# Patient Record
Sex: MALE | Race: WHITE | NOT HISPANIC OR LATINO | Employment: OTHER | URBAN - METROPOLITAN AREA
[De-identification: names, ages, dates, MRNs, and addresses within clinical notes are randomized per-mention and may not be internally consistent; named-entity substitution may affect disease eponyms.]

---

## 2017-08-24 ENCOUNTER — THERAPY VISIT (OUTPATIENT)
Dept: PHYSICAL THERAPY | Facility: CLINIC | Age: 67
End: 2017-08-24
Payer: COMMERCIAL

## 2017-08-24 DIAGNOSIS — M25.561 RIGHT KNEE PAIN, UNSPECIFIED CHRONICITY: Primary | ICD-10-CM

## 2017-08-24 PROCEDURE — 97110 THERAPEUTIC EXERCISES: CPT | Mod: GP | Performed by: PHYSICAL THERAPIST

## 2017-08-24 PROCEDURE — 97161 PT EVAL LOW COMPLEX 20 MIN: CPT | Mod: GP | Performed by: PHYSICAL THERAPIST

## 2017-08-24 ASSESSMENT — ACTIVITIES OF DAILY LIVING (ADL)
HOW_WOULD_YOU_RATE_THE_CURRENT_FUNCTION_OF_YOUR_KNEE_DURING_YOUR_USUAL_DAILY_ACTIVITIES_ON_A_SCALE_FROM_0_TO_100_WITH_100_BEING_YOUR_LEVEL_OF_KNEE_FUNCTION_PRIOR_TO_YOUR_INJURY_AND_0_BEING_THE_INABILITY_TO_PERFORM_ANY_OF_YOUR_USUAL_DAILY_ACTIVITIES?: 30
STIFFNESS: THE SYMPTOM AFFECTS MY ACTIVITY SLIGHTLY
KNEE_ACTIVITY_OF_DAILY_LIVING_SCORE: 41.43
SWELLING: THE SYMPTOM AFFECTS MY ACTIVITY SLIGHTLY
STAND: ACTIVITY IS SOMEWHAT DIFFICULT
KNEE_ACTIVITY_OF_DAILY_LIVING_SUM: 29
AS_A_RESULT_OF_YOUR_KNEE_INJURY,_HOW_WOULD_YOU_RATE_YOUR_CURRENT_LEVEL_OF_DAILY_ACTIVITY?: ABNORMAL
WALK: ACTIVITY IS FAIRLY DIFFICULT
RAW_SCORE: 29
PAIN: THE SYMPTOM AFFECTS MY ACTIVITY MODERATELY
GO UP STAIRS: ACTIVITY IS VERY DIFFICULT
LIMPING: THE SYMPTOM AFFECTS MY ACTIVITY SEVERELY
KNEEL ON THE FRONT OF YOUR KNEE: ACTIVITY IS VERY DIFFICULT
WEAKNESS: THE SYMPTOM AFFECTS MY ACTIVITY MODERATELY
HOW_WOULD_YOU_RATE_THE_OVERALL_FUNCTION_OF_YOUR_KNEE_DURING_YOUR_USUAL_DAILY_ACTIVITIES?: ABNORMAL
RISE FROM A CHAIR: ACTIVITY IS SOMEWHAT DIFFICULT
GIVING WAY, BUCKLING OR SHIFTING OF KNEE: THE SYMPTOM AFFECTS MY ACTIVITY SEVERELY
SQUAT: ACTIVITY IS FAIRLY DIFFICULT
GO DOWN STAIRS: ACTIVITY IS FAIRLY DIFFICULT
SIT WITH YOUR KNEE BENT: ACTIVITY IS SOMEWHAT DIFFICULT

## 2017-08-24 NOTE — PROGRESS NOTES
"Subjective:  Physical Therapy Initial Evaluation   8/24/17   Precautions/Restrictions/MD instructions: PT eval and treat (MD 8 visits)   Therapist Impression:   Pt is a 68 y/o male, with chronic history of right knee pain. Pt presents with pain, decreased ROM/mobility, and decreased strength. These impairments limit their ability to walking, sit to stand, and going up and down stairs. Skilled PT services necessary in order to reduce impairments and improve independent function.    Subjective:   Chief Complaint: Right knee pain; MD dx of severe OA (medial compartment). Chronic knee pain (5 years); wear and tear, but no trauma to knee.   DOI/onset: 8/22/2012  DOS: R knee scope (20 years ago)   Location: R knee (medial)  Quality: sharp  Frequency: intermittent  Radiates: none  Pain scale: Rest 0-1 /10 Activity 10/10    Sleeping: >2-3X/night due to knee pain    Exacerbated by: walking, sit to stand, stairs, yard work  Relieved by: aleve (PRN)  Progression: getting worse   Previous Treatment: none to note  Effect of prior treatment: n/a   PMH and/or surgical history: TKA L; R elbow; ganglion cyst removed, 18\" of colon removed; cornea transplant    Imaging: Xrays (severe OA, medial side R knee)   Occupation:  Job duties: driving, walking, repetitive tasks, computer work    Current HEP/exercise regimen: like to play with grandkids (play sports- football, baseball)  Patient's goals: painfree daily tasks, walking   Medications: prednisone; cholesterol meds   General health as reported by patient: good  Return to MD: follow up (3-4 weeks)   Red Flags: none to note    HPI                    Objective:  KNEE:    Standing Posture: lacking TKE on right; laterally rotated and in varus alignement. Bony calcification on medial joint line.    Gait: severe varus alignment on right knee       Swelling:    L R   Joint line 37cm 38.5cm         AROM: (* indicates patient's pain)   PROM:(over pressrue)   L  R L R "   Hyperextension   0 0 0 0   Extension   0 15* 0 5*   Flexion   120 120* 120 120*     Strength:  QS's: fair on right (good on left)  SLR: no extensor lag, but painful    Palpation: painful over medial calcification build up     Patellar mobility: hypo on right compared to left      System    Physical Exam    General     ROS    Assessment/Plan:      Patient is a 67 year old male with right side knee complaints.    Patient has the following significant findings with corresponding treatment plan.                Diagnosis 1:  Right knee pain  Pain -  hot/cold therapy, manual therapy, splint/taping/bracing/orthotics, self management, education and home program  Decreased ROM/flexibility - manual therapy and therapeutic exercise  Decreased joint mobility - manual therapy and therapeutic exercise  Decreased strength - therapeutic exercise and therapeutic activities  Impaired balance - neuro re-education and therapeutic activities  Decreased proprioception - neuro re-education and therapeutic activities  Inflammation - cold therapy and self management/home program  Impaired gait - gait training  Impaired muscle performance - neuro re-education  Decreased function - therapeutic activities  Impaired posture - neuro re-education  Instability -  Therapeutic Activity  Therapeutic Exercise    Therapy Evaluation Codes:   1) History comprised of:   Personal factors that impact the plan of care:      None.    Comorbidity factors that impact the plan of care are:      Osteoarthritis.     Medications impacting care: Anti-inflammatory.  2) Examination of Body Systems comprised of:   Body structures and functions that impact the plan of care:      Knee.   Activity limitations that impact the plan of care are:      Squatting/kneeling, Stairs, Standing, Walking, Working and Sleeping.  3) Clinical presentation characteristics are:   Stable/Uncomplicated.  4) Decision-Making    Low complexity using standardized patient assessment instrument  and/or measureable assessment of functional outcome.  Cumulative Therapy Evaluation is: Low complexity.    Previous and current functional limitations:  (See Goal Flow Sheet for this information)    Short term and Long term goals: (See Goal Flow Sheet for this information)     Communication ability:  Patient appears to be able to clearly communicate and understand verbal and written communication and follow directions correctly.  Treatment Explanation - The following has been discussed with the patient:   RX ordered/plan of care  Anticipated outcomes  Possible risks and side effects  This patient would benefit from PT intervention to resume normal activities.   Rehab potential is poor (due to severe bony deformity)     Frequency:  1 X week, once daily  Duration:  for 4 weeks  Discharge Plan:  Achieve all LTG.  Independent in home treatment program.  Reach maximal therapeutic benefit.    Please refer to the daily flowsheet for treatment today, total treatment time and time spent performing 1:1 timed codes.

## 2017-08-24 NOTE — LETTER
[facility]AP[facility]  [address]  [facility] [place] [address]  [phone]    2017    Re: Walker Ortiz   :   1950  MRN:  [id number]   REFERRING PHYSICIAN:   Dominik Ramirez    [facility]  Date of Initial Evaluation:  2017  Visits:  1 Rxs Used: 1  Reason for Referral:  Right knee pain, unspecified chronicity  EVALUATION SUMMARY  Subjective:  Physical Therapy Initial Evaluation   17   Precautions/Restrictions/MD instructions: PT eval and treat (MD 8 visits)   Therapist Impression:   Pt is a 66 y/o male, with chronic history of right knee pain. Pt presents with pain, decreased ROM/mobility, and decreased strength. These impairments limit their ability to walking, sit to stand, and going up and down stairs. Skilled PT services necessary in order to reduce impairments and improve independent function.Pertinent medical history includes:  Osteoarthritis.  Medical allergies: no.  Other surgeries include:  None reported.  Current medications:  None as reported by patient.  Current occupation is  .  Patient is working in normal job without restrictions.  Primary job tasks include:  Driving.Barriers include:  None as reported by patient.Red flags:  None as reported by patient.     Knee Activity of Daily Living Score: 41.43          Subjective:   Chief Complaint: Right knee pain; MD dx of severe OA (medial compartment). Chronic knee pain (5 years); wear and tear, but no trauma to knee.   DOI/onset: 2012  DOS: R knee scope (20 years ago)   Location: R knee (medial)  Quality: sharp  Frequency: intermittent  Radiates: none  Pain scale: Rest 0-1 /10 Activity 10/10    Sleeping: >2-3X/night due to knee pain    Exacerbated by: walking, sit to stand, stairs, yard work  Relieved by: aleve (PRN)  Progression: getting worse   Previous Treatment: none to note  Effect of prior treatment:  "n/a   PMH and/or surgical history: TKA L; R elbow; ganglion cyst removed, 18\" of colon removed; cornea transplant    Imaging: Xrays (severe OA, medial side R knee)   Re: Walker Ortiz   :   1950    Occupation:  Job duties: driving, walking, repetitive tasks, computer work    Current HEP/exercise regimen: like to play with grandkids (play sports- football, baseball)  Patient's goals: painfree daily tasks, walking   Medications: prednisone; cholesterol meds   General health as reported by patient: good  Return to MD: follow up (3-4 weeks)   Red Flags: none to note    Objective:  KNEE:  Standing Posture: lacking TKE on right; laterally rotated and in varus alignement. Bony calcification on medial joint line.  Gait: severe varus alignment on right knee   Swelling:    L R   Joint line 37cm 38.5cm       AROM: (* indicates patient's pain)   PROM:(over pressrue)   L  R L R   Hyperextension   0 0 0 0   Extension   0 15* 0 5*   Flexion   120 120* 120 120*   Strength:  QS's: fair on right (good on left)  SLR: no extensor lag, but painful  Palpation: painful over medial calcification build up   Patellar mobility: hypo on right compared to left  Assessment/Plan:    Patient is a 67 year old male with right side knee complaints.    Patient has the following significant findings with corresponding treatment plan.                Diagnosis 1:  Right knee pain  Pain -  hot/cold therapy, manual therapy, splint/taping/bracing/orthotics, self management, education and home program  Decreased ROM/flexibility - manual therapy and therapeutic exercise  Decreased joint mobility - manual therapy and therapeutic exercise  Decreased strength - therapeutic exercise and therapeutic activities  Impaired balance - neuro re-education and therapeutic activities  Decreased proprioception - neuro re-education and therapeutic activities  Inflammation - cold therapy and self management/home program  Impaired gait - gait " training  Impaired muscle performance - neuro re-education  Decreased function - therapeutic activities  Impaired posture - neuro re-education  Instability -  Therapeutic Activity  Re: Walker Ortiz   :   1950    Therapeutic Exercise  Therapy Evaluation Codes:   1) History comprised of:   Personal factors that impact the plan of care:      None.    Comorbidity factors that impact the plan of care are:      Osteoarthritis.     Medications impacting care: Anti-inflammatory.  2) Examination of Body Systems comprised of:   Body structures and functions that impact the plan of care:      Knee.   Activity limitations that impact the plan of care are:      Squatting/kneeling, Stairs, Standing, Walking, Working and Sleeping.  3) Clinical presentation characteristics are:   Stable/Uncomplicated.  4) Decision-Making    Low complexity using standardized patient assessment instrument and/or measureable assessment of functional outcome.  Cumulative Therapy Evaluation is: Low complexity.  Previous and current functional limitations:  (See Goal Flow Sheet for this information)    Short term and Long term goals: (See Goal Flow Sheet for this information)   Communication ability:  Patient appears to be able to clearly communicate and understand verbal and written communication and follow directions correctly.  Treatment Explanation - The following has been discussed with the patient:   RX ordered/plan of care  Anticipated outcomes  Possible risks and side effects  This patient would benefit from PT intervention to resume normal activities.   Rehab potential is poor (due to severe bony deformity)   Frequency:  1 X week, once daily  Duration:  for 4 weeks  Discharge Plan:  Achieve all LTG.  Independent in home treatment program.  Reach maximal therapeutic benefit.    Thank you for your referral.    INQUIRIES  Therapist: Leatha Enamorado DPT  [facility] PH[facility]AP[facility]  [address] [place] [address]  [address]  [place] [address]  Phone: [phone]  Fax: [phone]

## 2017-08-24 NOTE — MR AVS SNAPSHOT
"              After Visit Summary   8/24/2017    Walker Ortiz    MRN: [id number]           Patient Information     Date Of Birth          1950        Visit Information        Provider Department      8/24/2017 3:20 PM Leatha Enamorado, PT [facility] Physical Therapy        Today's Diagnoses     Right knee pain, unspecified chronicity    -  1       Follow-ups after your visit        Your next 10 appointments already scheduled     Aug 28, 2017 11:20 AM CDT   ALBA Extremity with Leatha Enamorado PT   [facility] (ALBA [place]  )    [address] [place] [address] [address]  [facility] [address]   [phone]            Sep 01, 2017  9:00 AM CDT   ALBA Extremity with Emir Doss PT   [facility] ([facility]  )    [address] [place] [address] [address]  [facility] [id number] [phone]              Who to contact     If you have questions or need follow up information about today's clinic visit or your schedule please contact [place] [facility]AP[facility] directly at [phone].  Normal or non-critical lab and imaging results will be communicated to you by MyChart, letter or phone within 4 business days after the clinic has received the results. If you do not hear from us within 7 days, please contact the clinic through "[employer]hart or phone. If you have a critical or abnormal lab result, we will notify you by phone as soon as possible.  Submit refill requests through [employer] or call your pharmacy and they will forward the refill request to us. Please allow 3 business days for your refill to be completed.          Additional Information About Your Visit        MyChart Information     [employer] lets you send messages to your doctor, view your test results, renew your prescriptions, schedule appointments and more. To sign up, go to [email or web address] . Click on \"Log in\" on the left side of the " "screen, which will take you to the Welcome page. Then click on \"Sign up Now\" on the right side of the page.     You will be asked to enter the access code listed below, as well as some personal information. Please follow the directions to create your username and password.     Your access code is: [email or web address]  Expires: 2017  4:01 PM     Your access code will  in 90 days. If you need help or a new code, please call your [facility] clinic or [phone].        Care EveryWhere ID     This is your Care EveryWhere ID. This could be used by other organizations to access your [facility] medical records  [id number]         Blood Pressure from Last 3 Encounters:   16 115/80   10/01/15 124/82   07/22/15 128/84    Weight from Last 3 Encounters:   16 95.7 kg (211 lb)   10/01/15 95.5 kg (210 lb 9.6 oz)   07/22/15 96.4 kg (212 lb 9.6 oz)              We Performed the Following     HC PT EVAL, LOW COMPLEXITY     ALBA INITIAL EVAL REPORT     THERAPEUTIC EXERCISES        Primary Care Provider Office Phone # Fax #    Raghavendra Soto [phone] [address] [facility] [address]        Equal Access to Services     : Hadii aad ku hadasho Soomaali, waaxda luqadaha, qaybta kaalmada adeegyada, waxay mirlandein hayjaycee brandt . So [facility] [phone].    ATENCIÓN: Si habla español, tiene a cheney disposición servicios gratuitos de asistencia lingüística. Llame al [phone].    We comply with applicable federal civil rights laws and [place] laws. We do not discriminate on the basis of race, color, national origin, age, disability sex, sexual orientation or gender identity.            Thank you!     Thank you for choosing [facility] PHYSICAL THERAP[facility]  for your care. Our goal is always to provide you with excellent care. Hearing back from our patients is one way we can continue to improve our services. Please take a few minutes to complete " the written survey that you may receive in the mail after your visit with us. Thank you!             Your Updated Medication List - Protect others around you: Learn how to safely use, store and throw away your medicines at www.disposemymeds.org.          This list is accurate as of: 8/24/17  4:01 PM.  Always use your most recent med list.                   Brand Name Dispense Instructions for use Diagnosis    ALEVE PO      Take 220 mg by mouth 2 times daily (with meals)        BENADRYL 25 MG tablet   Generic drug:  diphenhydrAMINE     56    1 TABLET EVERY 4 TO 6 HOURS AS NEEDED        prednisoLONE acetate 1 % ophthalmic susp    PRED FORTE     Place 1 drop Into the left eye daily        simvastatin 10 MG tablet    ZOCOR    90 tablet    TAKE 1 TABLET(10 MG) BY MOUTH AT BEDTIME    Hypercholesterolemia, Encounter for medication refill

## 2017-08-24 NOTE — PROGRESS NOTES
Subjective:    Patient is a 67 year old male presenting with rehab left ankle/foot hpi.                                      Pertinent medical history includes:  Osteoarthritis.  Medical allergies: no.  Other surgeries include:  None reported.  Current medications:  None as reported by patient.  Current occupation is  .  Patient is working in normal job without restrictions.  Primary job tasks include:  Driving.    Barriers include:  None as reported by patient.    Red flags:  None as reported by patient.           Knee Activity of Daily Living Score: 41.43            Objective:    System    Physical Exam    General     ROS    Assessment/Plan:

## 2017-08-28 ENCOUNTER — THERAPY VISIT (OUTPATIENT)
Dept: PHYSICAL THERAPY | Facility: CLINIC | Age: 67
End: 2017-08-28
Payer: COMMERCIAL

## 2017-08-28 DIAGNOSIS — M25.561 RIGHT KNEE PAIN, UNSPECIFIED CHRONICITY: ICD-10-CM

## 2017-08-28 PROCEDURE — 97140 MANUAL THERAPY 1/> REGIONS: CPT | Mod: GP | Performed by: PHYSICAL THERAPIST

## 2017-08-28 PROCEDURE — 97112 NEUROMUSCULAR REEDUCATION: CPT | Mod: GP | Performed by: PHYSICAL THERAPIST

## 2017-08-28 PROCEDURE — 97110 THERAPEUTIC EXERCISES: CPT | Mod: GP | Performed by: PHYSICAL THERAPIST

## 2017-09-08 NOTE — PROGRESS NOTES
[facility]  [address] [place] [id number] [phone]  Dept: [phone]    PRE-OP EVALUATION:  Today's date: 2017    Walker Ortiz (: 1950) presents for pre-operative evaluation assessment as requested by Dr. Ramirez.  He requires evaluation and anesthesia risk assessment prior to undergoing surgery/procedure for treatment of osteoarthritis in knee, right .  Proposed procedure:  Right total knee arthroplasty.     Date of Surgery/ Procedure: 2017  Time of Surgery/ Procedure: 1130  Hospital/Surgical Facility: [facility]  Primary Physician: Bhavana Mullen  Type of Anesthesia Anticipated: General     Patient has a Health Care Directive or Living Will:  NO    1. NO - Do you have a history of heart attack, stroke, stent, bypass or surgery on an artery in the head, neck, heart or legs?  2. NO - Do you ever have any pain or discomfort in your chest?  3. NO - Do you have a history of  Heart Failure?  4. NO - Are you troubled by shortness of breath when: walking on the level, up a slight hill or at night?  5. NO - Do you currently have a cold, bronchitis or other respiratory infection?  6. NO - Do you have a cough, shortness of breath or wheezing?  7. NO - Do you sometimes get pains in the calves of your legs when you walk?  8. NO - Do you or anyone in your family have previous history of blood clots?  9. NO - Do you or does anyone in your family have a serious bleeding problem such as prolonged bleeding following surgeries or cuts?  10. NO - Have you ever had problems with anemia or been told to take iron pills?  11. NO - Have you had any abnormal blood loss such as black, tarry or bloody stools, or abnormal vaginal bleeding?  12. NO - Have you ever had a blood transfusion?  13. NO - Have you or any of your relatives ever had problems with anesthesia?  14. YES - DO YOU HAVE SLEEP APNEA, EXCESSIVE SNORING OR DAYTIME DROWSINESS? Excessive snoring, several years, ongoing,  never sleep studies, no CPAP machine.   15. NO - Do you have any prosthetic heart valves?  16. NO - Do you have prosthetic joints?  17. NO - Is there any chance that you may be pregnant?        HPI:                                                      Brief HPI related to upcoming procedure: Right knee pain per ortho      HYPERLIPIDEMIA - Patient has a long history of significant Hyperlipidemia requiring medication for treatment with recent good control. Patient reports no problems or side effects with the medication.                                                             LDL Cholesterol Calculated   Date Value Ref Range Status   12/28/2016 75 0 - 99 mg/dL Final   ]                                                                                              .    MEDICAL HISTORY:                                                    Patient Active Problem List    Diagnosis Date Noted     Right knee pain, unspecified chronicity 08/24/2017     Priority: Medium     Advanced directives, counseling/discussion 10/01/2015     Priority: Medium     Advanced Care Planning 10/1/2015: Advanced Care Directive given to the patient today with options/directions on how to complete the packet and return to the clinic for scanning.  Francesca Muñoz CMA  12:45 PM October 1, 2015            Hypercholesterolemia 10/01/2015     Priority: Medium     Health Care Home 08/19/2013     Priority: Medium     Diverticulosis 08/19/2013     Priority: Medium     Cataract associated with another disorder 08/19/2013     Priority: Medium     Allergic rhinitis      Priority: Medium     Problem list name updated by automated process. Provider to review       Knee joint replacement status 09/06/2012     Priority: Medium     CARDIOVASCULAR SCREENING; LDL GOAL LESS THAN 160 10/31/2010     Priority: Medium      Past Medical History:   Diagnosis Date     Allergic rhinitis, cause unspecified      Calculus of kidney     ca oxalate     Diverticulitis of  colon (without mention of hemorrhage)      Keratoconus of left eye      Past Surgical History:   Procedure Laterality Date     ADJUST SUTURE EYE POST PROCEDURE  9/20/2013    Procedure: ADJUST SUTURE EYE POST PROCEDURE;  LEFT EYE PLACE CORNEAL SUTURES;  Surgeon: Tutu Coe MD;  Location: [facility]     APPENDECTOMY      incidential during colon surgery     ARTHROPLASTY KNEE  9/6/2012    Procedure: ARTHROPLASTY KNEE;  LEFT TOTAL KNEE ARTHROPLASTY (BIOMET)^;  Surgeon: Dominik Ramirez MD;  Location:  OR     C NONSPECIFIC PROCEDURE      ulnar nerve resection - R     C NONSPECIFIC PROCEDURE      B/L arthroscopy for cartilage     C NONSPECIFIC PROCEDURE  6/1992    meniscectomy on L     C NONSPECIFIC PROCEDURE  11/1979    R wrist ganglion cyst removed     EYE SURGERY  5/2012    cornea transplant     PHACOEMULSIFICATION CLEAR CORNEA WITH TORIC INTRAOCULAR LENS IMPLANT  9/17/2013    Procedure: PHACOEMULSIFICATION CLEAR CORNEA WITH TORIC INTRAOCULAR LENS IMPLANT;  LEFT PHACOEMULSIFICATION CLEAR CORNEA WITH TORIC INTRAOCULAR LENS IMPLANT ;  Surgeon: Tutu Coe MD;  Location: [facility]     SIGMOIDECTOMY  1995    for diverticulitis     Current Outpatient Prescriptions   Medication Sig Dispense Refill     simvastatin (ZOCOR) 10 MG tablet TAKE 1 TABLET(10 MG) BY MOUTH AT BEDTIME 90 tablet 3     Naproxen Sodium (ALEVE PO) Take 220 mg by mouth 2 times daily (with meals)       prednisoLONE acetate (PRED FORTE) 1 % ophthalmic suspension Place 1 drop Into the left eye daily  5     BENADRYL 25 MG OR TABS 1 TABLET EVERY 4 TO 6 HOURS AS NEEDED 56 0     OTC products: None, except as noted above  Benadryl prn for allergy  Naproxen is on hold already for surgery    Allergies   Allergen Reactions     No Known Drug Allergies       Latex Allergy: NO    Social History   Substance Use Topics     Smoking status: Never Smoker     Smokeless tobacco: Not on file     Alcohol use Yes      Comment: 1 a month     History   Drug Use  No       REVIEW OF SYSTEMS:                                                    Constitutional, neuro, ENT, endocrine, pulmonary, cardiac, gastrointestinal, genitourinary, musculoskeletal, integument and psychiatric systems are negative, except as otherwise noted.Left plantar wart.      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    Working at [employer] in Sales    Children ages 39 and 31, also several grandchildren  No pets  Hobbies: (Used to do sports and UMP, but now just watches)    EXAM:                                                    /72  Pulse 60  Temp 98.7  F (37.1  C) (Oral)  Resp 18  Wt 95.3 kg (210 lb)  SpO2 96%  BMI 32.41 kg/m2    GENERAL APPEARANCE: healthy, alert and no distress     EYES: EOMI,  PERRL     HENT: ear canals and TM's normal and nose and mouth without ulcers or lesions     NECK: no adenopathy, no asymmetry, masses, or scars and thyroid normal to palpation     RESP: lungs clear to auscultation - no rales, rhonchi or wheezes     CV: regular rates and rhythm, normal S1 S2, no S3 or S4 and no murmur, click or rub     ABDOMEN:  soft, nontender, no HSM or masses and bowel sounds normal     MS: extremities normal- no gross deformities noted, no evidence of inflammation in joints, FROM in all extremities. Right knee exam per orthopedics.     SKIN: no suspicious lesions or rashes  Left plantar wart-cryo today     NEURO: Normal strength and tone, sensory exam grossly normal, mentation intact and speech normal     PSYCH: mentation appears normal. and affect normal/bright     LYMPHATICS: No axillary, cervical, or supraclavicular nodes    DIAGNOSTICS:                                                      EKG: appears normal, NSR, unchanged from previous tracings, 1st degree AV block, T wave as before  Labs Drawn and in Process:   Unresulted  Labs Ordered in the Past 30 Days of this Admission     Date and Time Order Name Status Description    9/11/2017 1137 Methicillin Resistant Staph Aureus PCR In process           Recent Labs   Lab Test  09/16/15   0944  09/09/13   1324  08/19/13   0914  09/09/12   0623  09/08/12   0619   HGB   --   15.1   --    --   12.8*   PLT   --   250   --   181   --    NA  142   --   140   --    --    POTASSIUM  4.8   --   4.3   --    --    CR  0.87   --   0.86   --    --         IMPRESSION:                                                    Reason for surgery/procedure: right total knee planned per ortho  Diagnosis/reason for consult: preoperative clearance    The proposed surgical procedure is considered INTERMEDIATE risk.    REVISED CARDIAC RISK INDEX  The patient has the following serious cardiovascular risks for perioperative complications such as (MI, PE, VFib and 3  AV Block):  No serious cardiac risks  INTERPRETATION: 0 risks: Class I (very low risk - 0.4% complication rate)    The patient has the following additional risks for perioperative complications:  No identified additional risks      ICD-10-CM    1. Preop general physical exam Z01.818    2. FHx: heart disease Z82.49 Comp. Metabolic Panel (14) ([facility])     EKG 12-lead complete w/read - Clinics   3. Screening for abdominal aortic aneurysm Z13.6 Referral to [facility]   4. Screening for prostate cancer Z12.5 PSA Serum ([facility])   5. BMI 32.0-32.9,adult Z68.32    6. Chronic pain of right knee M25.561     G89.29    7. Plantar warts B07.0 DESTRUCT BENIGN LESION, UP TO 14   8. Advanced directives, counseling/discussion Z71.89    9. Screening for thyroid disorder Z13.29 TSH ([facility])   10. Screening for blood or protein in urine Z13.89 Urinalysis w/reflex protein, bili ([facility])   11. Hypercholesterolemia E78.00 Lipid Panel ([facility])   12. Screening, heart disease, ischemic Z13.6 EKG 12-lead complete w/read - Clinics   13. Screening, anemia, deficiency, iron Z13.0 CBC  with Diff/Plt ([facility])     Iron and TIBC ([facility])     Ferritin  Serum ([facility])       RECOMMENDATIONS:                                                      --Consult hospital rounder / IM to assist post-op medical management    --Patient is to take all scheduled medications on the day of surgery EXCEPT for modifications listed below.  Naprosyn on hold.    APPROVAL GIVEN to proceed with proposed procedure, without further diagnostic evaluation       Signed Electronically by: Bhavana Mullen MD    Copy of this evaluation report is provided to requesting physician.    [place] Preop Guidelines

## 2017-09-08 NOTE — PATIENT INSTRUCTIONS
"  Before Your Surgery      Call your surgeon if there is any change in your health. This includes signs of a cold or flu (such as a sore throat, runny nose, cough, rash or fever).    Do not smoke, drink alcohol or take over the counter medicine (unless your surgeon or primary care doctor tells you to) for the 24 hours before and after surgery.    If you take prescribed drugs: Follow your doctor s orders about which medicines to take and which to stop until after surgery.    Eating and drinking prior to surgery: follow the instructions from your surgeon    Take a shower or bath the night before surgery. Use the soap your surgeon gave you to gently clean your skin. If you do not have soap from your surgeon, use your regular soap. Do not shave or scrub the surgery site.  Wear clean pajamas and have clean sheets on your bed.     Continue naproxen on hold.  Weight loss is recommended as Estimated body mass index is 32.41 kg/(m^2) as calculated from the following:    Height as of 10/1/15: 1.715 m (5' 7.5\").    Weight as of this encounter: 95.3 kg (210 lb). Work on this after surgery.    OK for surger    "

## 2017-09-11 ENCOUNTER — OFFICE VISIT (OUTPATIENT)
Dept: FAMILY MEDICINE | Facility: CLINIC | Age: 67
End: 2017-09-11

## 2017-09-11 ENCOUNTER — HOSPITAL ENCOUNTER (OUTPATIENT)
Dept: LAB | Facility: CLINIC | Age: 67
Discharge: HOME | End: 2017-09-11
Attending: ORTHOPAEDIC SURGERY | Admitting: ORTHOPAEDIC SURGERY
Payer: COMMERCIAL

## 2017-09-11 VITALS
DIASTOLIC BLOOD PRESSURE: 72 MMHG | WEIGHT: 210 LBS | TEMPERATURE: 98.7 F | OXYGEN SATURATION: 96 % | HEART RATE: 60 BPM | SYSTOLIC BLOOD PRESSURE: 118 MMHG | RESPIRATION RATE: 18 BRPM | BODY MASS INDEX: 32.41 KG/M2

## 2017-09-11 DIAGNOSIS — Z71.89 ADVANCED DIRECTIVES, COUNSELING/DISCUSSION: Chronic | ICD-10-CM

## 2017-09-11 DIAGNOSIS — M25.561 CHRONIC PAIN OF RIGHT KNEE: ICD-10-CM

## 2017-09-11 DIAGNOSIS — Z13.29 SCREENING FOR THYROID DISORDER: ICD-10-CM

## 2017-09-11 DIAGNOSIS — Z12.5 SCREENING FOR PROSTATE CANCER: ICD-10-CM

## 2017-09-11 DIAGNOSIS — Z13.6 SCREENING FOR ABDOMINAL AORTIC ANEURYSM: ICD-10-CM

## 2017-09-11 DIAGNOSIS — G89.29 CHRONIC PAIN OF RIGHT KNEE: ICD-10-CM

## 2017-09-11 DIAGNOSIS — Z13.89 SCREENING FOR BLOOD OR PROTEIN IN URINE: ICD-10-CM

## 2017-09-11 DIAGNOSIS — Z13.0 SCREENING, ANEMIA, DEFICIENCY, IRON: ICD-10-CM

## 2017-09-11 DIAGNOSIS — E78.00 HYPERCHOLESTEROLEMIA: ICD-10-CM

## 2017-09-11 DIAGNOSIS — Z82.49 FHX: HEART DISEASE: ICD-10-CM

## 2017-09-11 DIAGNOSIS — Z13.6 SCREENING, HEART DISEASE, ISCHEMIC: ICD-10-CM

## 2017-09-11 DIAGNOSIS — Z01.812 PRE-OPERATIVE LABORATORY EXAMINATION: ICD-10-CM

## 2017-09-11 DIAGNOSIS — B07.0 PLANTAR WARTS: ICD-10-CM

## 2017-09-11 DIAGNOSIS — Z01.818 PREOP GENERAL PHYSICAL EXAM: Primary | ICD-10-CM

## 2017-09-11 LAB
% GRANULOCYTES: 70.2 % (ref 42.2–75.2)
BACTERIA URINE: 0
BILIRUB UR QL STRIP: 0
BLOOD URINE DIP: NORMAL
CASTS/LPF: 0
COLOR UR: YELLOW
CRYSTAL URINE: 0
EPITHELIAL CELLS - QUEST: 0
GLUCOSE UR STRIP-MCNC: 0 MG/DL
HCT VFR BLD AUTO: 44.7 % (ref 39–51)
HEMOGLOBIN: 14.9 G/DL (ref 13.4–17.5)
KETONES UR QL STRIP: 0
LEUKOCYTE ESTERASE URINE DIP: 0
LYMPHOCYTES NFR BLD AUTO: 23 % (ref 20.5–51.1)
MCH RBC QN AUTO: 30 PG (ref 27–31)
MCHC RBC AUTO-ENTMCNC: 33.4 G/DL (ref 33–37)
MCV RBC AUTO: 89.8 FL (ref 80–100)
MONOCYTES NFR BLD AUTO: 6.8 % (ref 1.7–9.3)
MRSA DNA SPEC QL NAA+PROBE: NEGATIVE
MUCOUS URINE: 0
NITRITE UR QL STRIP: NORMAL
PH UR STRIP: 5 PH (ref 5–9)
PLATELET # BLD AUTO: 223 K/UL (ref 140–450)
PROT UR QL: 0 MG/DL (ref ?–0.01)
RBC # BLD AUTO: 4.98 X10/CMM (ref 4.2–5.9)
RBC URINE: 0 (ref 0–3)
SP GR UR STRIP: 1.02 (ref 1–1.02)
SPECIMEN SOURCE: NORMAL
UROBILINOGEN UR QL STRIP: 0.2 EU/DL (ref 0.2–1)
WBC # BLD AUTO: 5.9 X10/CMM (ref 3.8–11)
WBC URINE: 0 (ref 0–3)

## 2017-09-11 PROCEDURE — 80061 LIPID PANEL: CPT | Mod: 90 | Performed by: FAMILY MEDICINE

## 2017-09-11 PROCEDURE — 81003 URINALYSIS AUTO W/O SCOPE: CPT | Performed by: FAMILY MEDICINE

## 2017-09-11 PROCEDURE — 84153 ASSAY OF PSA TOTAL: CPT | Mod: 90 | Performed by: FAMILY MEDICINE

## 2017-09-11 PROCEDURE — 40000829 ZZHCL STATISTIC STAPH AUREUS SUSCEPT SCREEN PCR: Performed by: ORTHOPAEDIC SURGERY

## 2017-09-11 PROCEDURE — 87641 MR-STAPH DNA AMP PROBE: CPT | Performed by: ORTHOPAEDIC SURGERY

## 2017-09-11 PROCEDURE — 17110 DESTRUCTION B9 LES UP TO 14: CPT | Performed by: FAMILY MEDICINE

## 2017-09-11 PROCEDURE — 40000830 ZZHCL STATISTIC STAPH AUREUS METH RESIST SCREEN PCR: Performed by: ORTHOPAEDIC SURGERY

## 2017-09-11 PROCEDURE — 80050 GENERAL HEALTH PANEL: CPT | Mod: 90 | Performed by: FAMILY MEDICINE

## 2017-09-11 PROCEDURE — 87640 STAPH A DNA AMP PROBE: CPT | Performed by: ORTHOPAEDIC SURGERY

## 2017-09-11 PROCEDURE — 93000 ELECTROCARDIOGRAM COMPLETE: CPT | Mod: 59 | Performed by: FAMILY MEDICINE

## 2017-09-11 PROCEDURE — 36415 COLL VENOUS BLD VENIPUNCTURE: CPT | Performed by: FAMILY MEDICINE

## 2017-09-11 PROCEDURE — 99214 OFFICE O/P EST MOD 30 MIN: CPT | Mod: 25 | Performed by: FAMILY MEDICINE

## 2017-09-11 NOTE — LETTER
[facility]  [address] [place]  [address]  Phone: [phone]    September 22, 2017      Walker Ortiz  [address] [facility] [address]              Dear Walker,    The results from your recent visit showed that your UA (urine) was okay.    CP: elevated sugar, Kidney insufficiency, High sodium and mildly low CO2. Liver tests were good. LDL cholesterol was good, Triglycerides elevated. Eat more fish, fish oil, ground flax seed and oatmeal. Less sugars. TSH thyroid test was good. PSA (prostate test) was good. Iron studies were fine. CBC was normal         Sincerely,     Bhavana Mullen M.D.    Results for orders placed or performed in visit on 09/11/17   Comp. Metabolic Panel (14) ([facility])   Result Value Ref Range    Glucose 121 (H) 65 - 99 mg/dL    Urea Nitrogen 21 8 - 27 mg/dL    Creatinine 1.39 (H) 0.76 - 1.27 mg/dL    eGFR If NonAfricn Am 52 (L) >59 mL/min/1.73    eGFR If Africn Am 61 >59 mL/min/1.73    BUN/Creatinine Ratio 15 10 - 24    Sodium 156 (H) 134 - 144 mmol/L    Potassium 4.4 3.5 - 5.2 mmol/L    Chloride 106 96 - 106 mmol/L    Total CO2 15 (L) 18 - 28 mmol/L    Calcium 9.6 8.6 - 10.2 mg/dL    Protein Total 7.4 6.0 - 8.5 g/dL    Albumin 4.5 3.6 - 4.8 g/dL    Globulin, Total 2.9 1.5 - 4.5 g/dL    A/G Ratio 1.6 1.2 - 2.2    Bilirubin Total 0.5 0.0 - 1.2 mg/dL    Alkaline Phosphatase 76 39 - 117 IU/L    AST 25 0 - 40 IU/L    ALT 23 0 - 44 IU/L    Narrative    Performed at:  01 - [facility] [address], [place], [place]  [id number]  : Diallo Soto MD, Phone:  [phone]   Lipid Panel ([facility])   Result Value Ref Range    Cholesterol 169 100 - 199 mg/dL    Triglycerides 204 (H) 0 - 149 mg/dL    HDL Cholesterol 48 >39 mg/dL    VLDL Cholesterol Julio Cesar 41 (H) 5 - 40 mg/dL    LDL Cholesterol Calculated 80 0 - 99 mg/dL    LDL/HDL Ratio 1.7 0.0 - 3.6 ratio units    Narrative    Performed at:  01 - [facility] [address], [place], [place]   [id number]  : Diallo Soto MD, Phone:  [phone]   CBC with Diff/Plt ([facility])   Result Value Ref Range    WBC x10/cmm 5.9 3.8 - 11.0 x10/cmm    % Lymphocytes 23.0 20.5 - 51.1 %    % Monocytes 6.8 1.7 - 9.3 %    % Granulocytes 70.2 42.2 - 75.2 %    RBC x10/cmm 4.98 4.2 - 5.9 x10/cmm    Hemoglobin 14.9 13.4 - 17.5 g/dl    Hematocrit 44.7 39 - 51 %    MCV 89.8 80 - 100 fL    MCH 30.0 27.0 - 31.0 pg    MCHC 33.4 33.0 - 37.0 g/dL    Platelet Count 223 140 - 450 K/uL   TSH ([facility])   Result Value Ref Range    TSH 3.960 0.450 - 4.500 uIU/mL    Narrative    Performed at:  01 - [facility] [address], [place], [place]  [id number]  : Diallo Soto MD, Phone:  [phone]   Urinalysis w/reflex protein, bili ([facility])   Result Value Ref Range    Color Urine Yellow     pH Urine 5.0 5 - 9 pH    Specific Gravity Urine 1.020 1.005 - 1.025    Protein Urine 0 0.01 mg/dL    Glucose Urine 0     Ketones Urine 0     Leukocyte Esterase Urine 0     Blood Urine trace - lysed     Nitrite Urine Neg NEG    Bilirubin Urine Dip 0     Urobilinogen Urine 0.2 0.2 - 1.0 EU/dL    WBC Urine 0 0 - 3    RBC Urine 0 0 - 3    Epithelial Cells 0     Crystal Urine 0     Bacteria Urine 0     Mucous Urine 0     Casts/LPF 0    PSA Serum ([facility])   Result Value Ref Range    PSA NG/ML 0.7 0.0 - 4.0 ng/mL    Narrative    Performed at:  01 - [facility] [address], [place], [place]  [id number]  : Diallo Soto MD, Phone:  [phone]   Iron and TIBC ([facility])   Result Value Ref Range    Iron Binding Cap 330 250 - 450 ug/dL    UIBC 233 111 - 343 ug/dL    Iron 97 38 - 169 ug/dL    Iron Saturation 29 15 - 55 %    Narrative    Performed at:  01 - [facility] [address], [place], [place]  [id number]  : Diallo Soto MD, Phone:  [phone]   Ferritin  Serum ([facility])   Result Value Ref Range    Ferritin 181 30 - 400 ng/mL    Narrative    Performed at:  01 - [facility]  [address], [place]   [id number]  : Diallo Soto MD, Phone:  [phone]

## 2017-09-11 NOTE — MR AVS SNAPSHOT
"              After Visit Summary   9/11/2017    Walker Ortiz    MRN: [id number]           Patient Information     Date Of Birth          1950        Visit Information        Provider Department      9/11/2017 3:15 PM Bhavana Mullen MD [facility]        Today's Diagnoses     Preop general physical exam    -  1    FHx: heart disease        Screening for abdominal aortic aneurysm        Screening for prostate cancer        BMI 32.0-32.9,adult        Chronic pain of right knee        Plantar warts        Advanced directives, counseling/discussion        Screening for thyroid disorder        Screening for blood or protein in urine        Hypercholesterolemia        Screening, heart disease, ischemic        Screening, anemia, deficiency, iron          Care Instructions      Before Your Surgery      Call your surgeon if there is any change in your health. This includes signs of a cold or flu (such as a sore throat, runny nose, cough, rash or fever).    Do not smoke, drink alcohol or take over the counter medicine (unless your surgeon or primary care doctor tells you to) for the 24 hours before and after surgery.    If you take prescribed drugs: Follow your doctor s orders about which medicines to take and which to stop until after surgery.    Eating and drinking prior to surgery: follow the instructions from your surgeon    Take a shower or bath the night before surgery. Use the soap your surgeon gave you to gently clean your skin. If you do not have soap from your surgeon, use your regular soap. Do not shave or scrub the surgery site.  Wear clean pajamas and have clean sheets on your bed.     Continue naproxen on hold.  Weight loss is recommended as Estimated body mass index is 32.41 kg/(m^2) as calculated from the following:    Height as of 10/1/15: 1.715 m (5' 7.5\").    Weight as of this encounter: 95.3 kg (210 lb). Work on this after surgery.    OK for surger            Follow-ups after your " "visit        Additional Services     Referral [facility]       Referral to [facility]  Phone: [phone]  Fax: [phone]  Reason for referral: [place] AAA screen                  Follow-up notes from your care team     Return if symptoms worsen or fail to improve.      Your next 10 appointments already scheduled     Sep 18, 2017   Procedure with Dominik Ramirez MD   [facility] (--)    [address]  [facility] [id number] [phone]              Future tests that were ordered for you today     Open Future Orders        Priority Expected Expires Ordered    Methicillin Resistant Staph Aureus PCR Routine 9/11/2017 9/18/2017 9/10/2017            Who to contact     If you have questions or need follow up information about today's clinic visit or your schedule please contact [facility] directly at [phone].  Normal or non-critical lab and imaging results will be communicated to you by MyChart, letter or phone within 4 business days after the clinic has received the results. If you do not hear from us within 7 days, please contact the clinic through [employer] or phone. If you have a critical or abnormal lab result, we will notify you by phone as soon as possible.  Submit refill requests through [employer] or call your pharmacy and they will forward the refill request to us. Please allow 3 business days for your refill to be completed.          Additional Information About Your Visit        [employer] Information     [employer] lets you send messages to your doctor, view your test results, renew your prescriptions, schedule appointments and more. To sign up, go to [email or web address] . Click on \"Log in\" on the left side of the screen, which will take you to the Welcome page. Then click on \"Sign up Now\" on the right side of the page.     You will be asked to enter the access code listed below, as well as some personal information. Please follow the " directions to create your username and password.     Your access code is: [email or web address]  Expires: 2017  4:01 PM     Your access code will  in 90 days. If you need help or a new code, please call your [place] clinic or [phone].        Care EveryWhere ID     This is your Care EveryWhere ID. This could be used by other organizations to access your [place] medical records  [id number]        Your Vitals Were     Pulse Temperature Respirations Pulse Oximetry BMI (Body Mass Index)       60 98.7  F (37.1  C) (Oral) 18 96% 32.41 kg/m2        Blood Pressure from Last 3 Encounters:   17 118/72   16 115/80   10/01/15 124/82    Weight from Last 3 Encounters:   17 95.3 kg (210 lb)   16 95.7 kg (211 lb)   10/01/15 95.5 kg (210 lb 9.6 oz)              We Performed the Following     CBC with Diff/Plt ([facility])     Comp. Metabolic Panel (14) ([facility])     DESTRUCT BENIGN LESION, UP TO 14     EKG 12-lead complete w/read - Clinics     Ferritin  Serum ([facility])     Iron and TIBC ([facility])     Lipid Panel ([facility])     PSA Serum ([facility])     [facility]     TSH ([facility])     Urinalysis w/reflex protein, bili ([facility])        Primary Care Provider Office Phone # Fax #    Bhavana Mullen [phone] [address]  [facility] [address]        Equal Access to Services     [facility]: Hadii aad ku hadasho Soomaali, waaxda luqadaha, qaybta kaalmada adeegledy, saadia brandt . So [facility] [phone].    ATENCIÓN: Si habla español, tiene a cheney disposición servicios gratuitos de asistencia lingüística. Llame al [phone].    We comply with applicable federal civil rights laws and [place] laws. We do not discriminate on the basis of race, color, national origin, age, disability sex, sexual orientation or gender identity.            Thank you!     Thank you for choosing [facility]  for your care. Our goal is always to provide  you with excellent care. Hearing back from our patients is one way we can continue to improve our services. Please take a few minutes to complete the written survey that you may receive in the mail after your visit with us. Thank you!             Your Updated Medication List - Protect others around you: Learn how to safely use, store and throw away your medicines at www.disposemymeds.org.          This list is accurate as of: 9/11/17  4:52 PM.  Always use your most recent med list.                   Brand Name Dispense Instructions for use Diagnosis    ALEVE PO      Take 220 mg by mouth 2 times daily (with meals)        BENADRYL 25 MG tablet   Generic drug:  diphenhydrAMINE     56    1 TABLET EVERY 4 TO 6 HOURS AS NEEDED        prednisoLONE acetate 1 % ophthalmic susp    PRED FORTE     Place 1 drop Into the left eye daily        simvastatin 10 MG tablet    ZOCOR    90 tablet    TAKE 1 TABLET(10 MG) BY MOUTH AT BEDTIME    Hypercholesterolemia, Encounter for medication refill

## 2017-09-12 ENCOUNTER — TRANSFERRED RECORDS (OUTPATIENT)
Dept: HEALTH INFORMATION MANAGEMENT | Facility: CLINIC | Age: 67
End: 2017-09-12

## 2017-09-13 LAB
ALBUMIN SERPL-MCNC: 4.5 G/DL (ref 3.6–4.8)
ALBUMIN/GLOB SERPL: 1.6 {RATIO} (ref 1.2–2.2)
ALP SERPL-CCNC: 76 IU/L (ref 39–117)
ALT SERPL-CCNC: 23 IU/L (ref 0–44)
AST SERPL-CCNC: 25 IU/L (ref 0–40)
BILIRUB SERPL-MCNC: 0.5 MG/DL (ref 0–1.2)
BUN SERPL-MCNC: 21 MG/DL (ref 8–27)
BUN/CREATININE RATIO: 15 (ref 10–24)
CALCIUM SERPL-MCNC: 9.6 MG/DL (ref 8.6–10.2)
CHLORIDE SERPLBLD-SCNC: 106 MMOL/L (ref 96–106)
CHOLEST SERPL-MCNC: 169 MG/DL (ref 100–199)
CREAT SERPL-MCNC: 1.39 MG/DL (ref 0.76–1.27)
EGFR IF AFRICN AM: 61 ML/MIN/1.73
EGFR IF NONAFRICN AM: 52 ML/MIN/1.73
FERRITIN SERPL-MCNC: 181 NG/ML (ref 30–400)
GLOBULIN, TOTAL: 2.9 G/DL (ref 1.5–4.5)
GLUCOSE SERPL-MCNC: 121 MG/DL (ref 65–99)
HDLC SERPL-MCNC: 48 MG/DL
IRON BINDING CAP: 330 UG/DL (ref 250–450)
IRON SATURATION: 29 % (ref 15–55)
IRON: 97 UG/DL (ref 38–169)
LDL/HDL RATIO: 1.7 RATIO UNITS (ref 0–3.6)
LDLC SERPL CALC-MCNC: 80 MG/DL (ref 0–99)
POTASSIUM SERPL-SCNC: 4.4 MMOL/L (ref 3.5–5.2)
PROT SERPL-MCNC: 7.4 G/DL (ref 6–8.5)
PSA NG/ML: 0.7 NG/ML (ref 0–4)
SODIUM SERPL-SCNC: 156 MMOL/L (ref 134–144)
TOTAL CO2: 15 MMOL/L (ref 18–28)
TRIGL SERPL-MCNC: 204 MG/DL (ref 0–149)
TSH BLD-ACNC: 3.96 UIU/ML (ref 0.45–4.5)
UIBC: 233 UG/DL (ref 111–343)
VLDLC SERPL CALC-MCNC: 41 MG/DL (ref 5–40)

## 2017-09-14 NOTE — H&P (VIEW-ONLY)
[facility]  [address] [place] [id number] [phone]  Dept: [phone]    PRE-OP EVALUATION:  Today's date: 2017    Walker Ortiz (: 1950) presents for pre-operative evaluation assessment as requested by Dr. Ramirez.  He requires evaluation and anesthesia risk assessment prior to undergoing surgery/procedure for treatment of osteoarthritis in knee, right .  Proposed procedure:  Right total knee arthroplasty.     Date of Surgery/ Procedure: 2017  Time of Surgery/ Procedure: 1130  Hospital/Surgical Facility: [facility]  Primary Physician: Bhavana Mullen  Type of Anesthesia Anticipated: General     Patient has a Health Care Directive or Living Will:  NO    1. NO - Do you have a history of heart attack, stroke, stent, bypass or surgery on an artery in the head, neck, heart or legs?  2. NO - Do you ever have any pain or discomfort in your chest?  3. NO - Do you have a history of  Heart Failure?  4. NO - Are you troubled by shortness of breath when: walking on the level, up a slight hill or at night?  5. NO - Do you currently have a cold, bronchitis or other respiratory infection?  6. NO - Do you have a cough, shortness of breath or wheezing?  7. NO - Do you sometimes get pains in the calves of your legs when you walk?  8. NO - Do you or anyone in your family have previous history of blood clots?  9. NO - Do you or does anyone in your family have a serious bleeding problem such as prolonged bleeding following surgeries or cuts?  10. NO - Have you ever had problems with anemia or been told to take iron pills?  11. NO - Have you had any abnormal blood loss such as black, tarry or bloody stools, or abnormal vaginal bleeding?  12. NO - Have you ever had a blood transfusion?  13. NO - Have you or any of your relatives ever had problems with anesthesia?  14. YES - DO YOU HAVE SLEEP APNEA, EXCESSIVE SNORING OR DAYTIME DROWSINESS? Excessive snoring, several years, ongoing,  never sleep studies, no CPAP machine.   15. NO - Do you have any prosthetic heart valves?  16. NO - Do you have prosthetic joints?  17. NO - Is there any chance that you may be pregnant?        HPI:                                                      Brief HPI related to upcoming procedure: Right knee pain per ortho      HYPERLIPIDEMIA - Patient has a long history of significant Hyperlipidemia requiring medication for treatment with recent good control. Patient reports no problems or side effects with the medication.                                                             LDL Cholesterol Calculated   Date Value Ref Range Status   12/28/2016 75 0 - 99 mg/dL Final   ]                                                                                              .    MEDICAL HISTORY:                                                    Patient Active Problem List    Diagnosis Date Noted     Right knee pain, unspecified chronicity 08/24/2017     Priority: Medium     Advanced directives, counseling/discussion 10/01/2015     Priority: Medium     Advanced Care Planning 10/1/2015: Advanced Care Directive given to the patient today with options/directions on how to complete the packet and return to the clinic for scanning.  Francesca Muñoz CMA  12:45 PM October 1, 2015            Hypercholesterolemia 10/01/2015     Priority: Medium     Health Care Home 08/19/2013     Priority: Medium     Diverticulosis 08/19/2013     Priority: Medium     Cataract associated with another disorder 08/19/2013     Priority: Medium     Allergic rhinitis      Priority: Medium     Problem list name updated by automated process. Provider to review       Knee joint replacement status 09/06/2012     Priority: Medium     CARDIOVASCULAR SCREENING; LDL GOAL LESS THAN 160 10/31/2010     Priority: Medium      Past Medical History:   Diagnosis Date     Allergic rhinitis, cause unspecified      Calculus of kidney     ca oxalate     Diverticulitis of  colon (without mention of hemorrhage)      Keratoconus of left eye      Past Surgical History:   Procedure Laterality Date     ADJUST SUTURE EYE POST PROCEDURE  9/20/2013    Procedure: ADJUST SUTURE EYE POST PROCEDURE;  LEFT EYE PLACE CORNEAL SUTURES;  Surgeon: Tutu Coe MD;  Location: [facility]     APPENDECTOMY      incidential during colon surgery     ARTHROPLASTY KNEE  9/6/2012    Procedure: ARTHROPLASTY KNEE;  LEFT TOTAL KNEE ARTHROPLASTY (BIOMET)^;  Surgeon: Dominik Ramirez MD;  Location:  OR     C NONSPECIFIC PROCEDURE      ulnar nerve resection - R     C NONSPECIFIC PROCEDURE      B/L arthroscopy for cartilage     C NONSPECIFIC PROCEDURE  6/1992    meniscectomy on L     C NONSPECIFIC PROCEDURE  11/1979    R wrist ganglion cyst removed     EYE SURGERY  5/2012    cornea transplant     PHACOEMULSIFICATION CLEAR CORNEA WITH TORIC INTRAOCULAR LENS IMPLANT  9/17/2013    Procedure: PHACOEMULSIFICATION CLEAR CORNEA WITH TORIC INTRAOCULAR LENS IMPLANT;  LEFT PHACOEMULSIFICATION CLEAR CORNEA WITH TORIC INTRAOCULAR LENS IMPLANT ;  Surgeon: Tutu Coe MD;  Location: [facility]     SIGMOIDECTOMY  1995    for diverticulitis     Current Outpatient Prescriptions   Medication Sig Dispense Refill     simvastatin (ZOCOR) 10 MG tablet TAKE 1 TABLET(10 MG) BY MOUTH AT BEDTIME 90 tablet 3     Naproxen Sodium (ALEVE PO) Take 220 mg by mouth 2 times daily (with meals)       prednisoLONE acetate (PRED FORTE) 1 % ophthalmic suspension Place 1 drop Into the left eye daily  5     BENADRYL 25 MG OR TABS 1 TABLET EVERY 4 TO 6 HOURS AS NEEDED 56 0     OTC products: None, except as noted above  Benadryl prn for allergy  Naproxen is on hold already for surgery    Allergies   Allergen Reactions     No Known Drug Allergies       Latex Allergy: NO    Social History   Substance Use Topics     Smoking status: Never Smoker     Smokeless tobacco: Not on file     Alcohol use Yes      Comment: 1 a month     History   Drug Use  No       REVIEW OF SYSTEMS:                                                    Constitutional, neuro, ENT, endocrine, pulmonary, cardiac, gastrointestinal, genitourinary, musculoskeletal, integument and psychiatric systems are negative, except as otherwise noted.Left plantar wart.      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    Working at [employer] in Sales    Children ages 39 and 31, also several grandchildren  No pets  Hobbies: (Used to do sports and UMP, but now just watches)    EXAM:                                                    /72  Pulse 60  Temp 98.7  F (37.1  C) (Oral)  Resp 18  Wt 95.3 kg (210 lb)  SpO2 96%  BMI 32.41 kg/m2    GENERAL APPEARANCE: healthy, alert and no distress     EYES: EOMI,  PERRL     HENT: ear canals and TM's normal and nose and mouth without ulcers or lesions     NECK: no adenopathy, no asymmetry, masses, or scars and thyroid normal to palpation     RESP: lungs clear to auscultation - no rales, rhonchi or wheezes     CV: regular rates and rhythm, normal S1 S2, no S3 or S4 and no murmur, click or rub     ABDOMEN:  soft, nontender, no HSM or masses and bowel sounds normal     MS: extremities normal- no gross deformities noted, no evidence of inflammation in joints, FROM in all extremities. Right knee exam per orthopedics.     SKIN: no suspicious lesions or rashes  Left plantar wart-cryo today     NEURO: Normal strength and tone, sensory exam grossly normal, mentation intact and speech normal     PSYCH: mentation appears normal. and affect normal/bright     LYMPHATICS: No axillary, cervical, or supraclavicular nodes    DIAGNOSTICS:                                                      EKG: appears normal, NSR, unchanged from previous tracings, 1st degree AV block, T wave as before  Labs Drawn and in Process:   Unresulted  Labs Ordered in the Past 30 Days of this Admission     Date and Time Order Name Status Description    9/11/2017 1137 Methicillin Resistant Staph Aureus PCR In process           Recent Labs   Lab Test  09/16/15   0944  09/09/13   1324  08/19/13   0914  09/09/12   0623  09/08/12   0619   HGB   --   15.1   --    --   12.8*   PLT   --   250   --   181   --    NA  142   --   140   --    --    POTASSIUM  4.8   --   4.3   --    --    CR  0.87   --   0.86   --    --         IMPRESSION:                                                    Reason for surgery/procedure: right total knee planned per ortho  Diagnosis/reason for consult: preoperative clearance    The proposed surgical procedure is considered INTERMEDIATE risk.    REVISED CARDIAC RISK INDEX  The patient has the following serious cardiovascular risks for perioperative complications such as (MI, PE, VFib and 3  AV Block):  No serious cardiac risks  INTERPRETATION: 0 risks: Class I (very low risk - 0.4% complication rate)    The patient has the following additional risks for perioperative complications:  No identified additional risks      ICD-10-CM    1. Preop general physical exam Z01.818    2. FHx: heart disease Z82.49 Comp. Metabolic Panel (14) ([facility])     EKG 12-lead complete w/read - Clinics   3. Screening for abdominal aortic aneurysm Z13.6 Referral to [facility]   4. Screening for prostate cancer Z12.5 PSA Serum ([facility])   5. BMI 32.0-32.9,adult Z68.32    6. Chronic pain of right knee M25.561     G89.29    7. Plantar warts B07.0 DESTRUCT BENIGN LESION, UP TO 14   8. Advanced directives, counseling/discussion Z71.89    9. Screening for thyroid disorder Z13.29 TSH ([facility])   10. Screening for blood or protein in urine Z13.89 Urinalysis w/reflex protein, bili ([facility])   11. Hypercholesterolemia E78.00 Lipid Panel ([facility])   12. Screening, heart disease, ischemic Z13.6 EKG 12-lead complete w/read - Clinics   13. Screening, anemia, deficiency, iron Z13.0 CBC  with Diff/Plt ([facility])     Iron and TIBC ([facility])     Ferritin  Serum ([facility])       RECOMMENDATIONS:                                                      --Consult hospital rounder / IM to assist post-op medical management    --Patient is to take all scheduled medications on the day of surgery EXCEPT for modifications listed below.  Naprosyn on hold.    APPROVAL GIVEN to proceed with proposed procedure, without further diagnostic evaluation       Signed Electronically by: Bhavana Mullen MD    Copy of this evaluation report is provided to requesting physician.    [facility] Preop Guidelines

## 2017-09-18 ENCOUNTER — SURGERY (OUTPATIENT)
Age: 67
End: 2017-09-18

## 2017-09-18 ENCOUNTER — HOSPITAL ENCOUNTER (INPATIENT)
Facility: CLINIC | Age: 67
LOS: 3 days | Discharge: HOME | DRG: 470 | End: 2017-09-21
Attending: ORTHOPAEDIC SURGERY | Admitting: ORTHOPAEDIC SURGERY
Payer: COMMERCIAL

## 2017-09-18 ENCOUNTER — ANESTHESIA EVENT (OUTPATIENT)
Dept: SURGERY | Facility: CLINIC | Age: 67
DRG: 470 | End: 2017-09-18
Payer: COMMERCIAL

## 2017-09-18 ENCOUNTER — ANESTHESIA (OUTPATIENT)
Dept: SURGERY | Facility: CLINIC | Age: 67
DRG: 470 | End: 2017-09-18
Payer: COMMERCIAL

## 2017-09-18 ENCOUNTER — APPOINTMENT (OUTPATIENT)
Dept: GENERAL RADIOLOGY | Facility: CLINIC | Age: 67
DRG: 470 | End: 2017-09-18
Attending: ORTHOPAEDIC SURGERY
Payer: COMMERCIAL

## 2017-09-18 DIAGNOSIS — M25.561 RIGHT KNEE PAIN, UNSPECIFIED CHRONICITY: Primary | ICD-10-CM

## 2017-09-18 LAB
ALBUMIN SERPL-MCNC: 3.8 G/DL (ref 3.4–5)
ALP SERPL-CCNC: 72 U/L (ref 40–150)
ALT SERPL W P-5'-P-CCNC: 40 U/L (ref 0–70)
ANION GAP SERPL CALCULATED.3IONS-SCNC: 8 MMOL/L (ref 3–14)
AST SERPL W P-5'-P-CCNC: 26 U/L (ref 0–45)
BILIRUB SERPL-MCNC: 0.7 MG/DL (ref 0.2–1.3)
BUN SERPL-MCNC: 15 MG/DL (ref 7–30)
CALCIUM SERPL-MCNC: 8.8 MG/DL (ref 8.5–10.1)
CHLORIDE SERPL-SCNC: 109 MMOL/L (ref 94–109)
CO2 SERPL-SCNC: 23 MMOL/L (ref 20–32)
CREAT SERPL-MCNC: 0.92 MG/DL (ref 0.66–1.25)
GFR SERPL CREATININE-BSD FRML MDRD: 82 ML/MIN/1.7M2
GLUCOSE SERPL-MCNC: 96 MG/DL (ref 70–99)
HGB BLD-MCNC: 15.3 G/DL (ref 13.3–17.7)
PLATELET # BLD AUTO: 238 10E9/L (ref 150–450)
POTASSIUM SERPL-SCNC: 3.9 MMOL/L (ref 3.4–5.3)
PROT SERPL-MCNC: 7.5 G/DL (ref 6.8–8.8)
SODIUM SERPL-SCNC: 140 MMOL/L (ref 133–144)

## 2017-09-18 PROCEDURE — 80048 BASIC METABOLIC PNL TOTAL CA: CPT | Performed by: ANESTHESIOLOGY

## 2017-09-18 PROCEDURE — 25800025 ZZH RX 258: Performed by: ORTHOPAEDIC SURGERY

## 2017-09-18 PROCEDURE — 27110028 ZZH OR GENERAL SUPPLY NON-STERILE: Performed by: ORTHOPAEDIC SURGERY

## 2017-09-18 PROCEDURE — 40000986 XR KNEE PORT RT 1/2 VW: Mod: RT

## 2017-09-18 PROCEDURE — 27210794 ZZH OR GENERAL SUPPLY STERILE: Performed by: ORTHOPAEDIC SURGERY

## 2017-09-18 PROCEDURE — 25000132 ZZH RX MED GY IP 250 OP 250 PS 637: Mod: GY | Performed by: ORTHOPAEDIC SURGERY

## 2017-09-18 PROCEDURE — 25000128 H RX IP 250 OP 636: Performed by: ANESTHESIOLOGY

## 2017-09-18 PROCEDURE — 71000012 ZZH RECOVERY PHASE 1 LEVEL 1 FIRST HR: Performed by: ORTHOPAEDIC SURGERY

## 2017-09-18 PROCEDURE — 25000128 H RX IP 250 OP 636: Performed by: ORTHOPAEDIC SURGERY

## 2017-09-18 PROCEDURE — 36000063 ZZH SURGERY LEVEL 4 EA 15 ADDTL MIN: Performed by: ORTHOPAEDIC SURGERY

## 2017-09-18 PROCEDURE — 25000125 ZZHC RX 250: Performed by: NURSE ANESTHETIST, CERTIFIED REGISTERED

## 2017-09-18 PROCEDURE — 85049 AUTOMATED PLATELET COUNT: CPT | Performed by: ANESTHESIOLOGY

## 2017-09-18 PROCEDURE — 71000013 ZZH RECOVERY PHASE 1 LEVEL 1 EA ADDTL HR: Performed by: ORTHOPAEDIC SURGERY

## 2017-09-18 PROCEDURE — 85018 HEMOGLOBIN: CPT | Performed by: ANESTHESIOLOGY

## 2017-09-18 PROCEDURE — 0SRC0J9 REPLACEMENT OF RIGHT KNEE JOINT WITH SYNTHETIC SUBSTITUTE, CEMENTED, OPEN APPROACH: ICD-10-PCS | Performed by: ORTHOPAEDIC SURGERY

## 2017-09-18 PROCEDURE — 25000125 ZZHC RX 250: Performed by: ANESTHESIOLOGY

## 2017-09-18 PROCEDURE — 12000007 ZZH R&B INTERMEDIATE

## 2017-09-18 PROCEDURE — 37000009 ZZH ANESTHESIA TECHNICAL FEE, EACH ADDTL 15 MIN: Performed by: ORTHOPAEDIC SURGERY

## 2017-09-18 PROCEDURE — C1776 JOINT DEVICE (IMPLANTABLE): HCPCS | Performed by: ORTHOPAEDIC SURGERY

## 2017-09-18 PROCEDURE — 27110038 ZZH RX 271: Performed by: ANESTHESIOLOGY

## 2017-09-18 PROCEDURE — 37000008 ZZH ANESTHESIA TECHNICAL FEE, 1ST 30 MIN: Performed by: ORTHOPAEDIC SURGERY

## 2017-09-18 PROCEDURE — 40000169 ZZH STATISTIC PRE-PROCEDURE ASSESSMENT I: Performed by: ORTHOPAEDIC SURGERY

## 2017-09-18 PROCEDURE — 36415 COLL VENOUS BLD VENIPUNCTURE: CPT | Performed by: ANESTHESIOLOGY

## 2017-09-18 PROCEDURE — 25000128 H RX IP 250 OP 636: Performed by: NURSE ANESTHETIST, CERTIFIED REGISTERED

## 2017-09-18 PROCEDURE — A9270 NON-COVERED ITEM OR SERVICE: HCPCS | Mod: GY | Performed by: ORTHOPAEDIC SURGERY

## 2017-09-18 PROCEDURE — 27810169 ZZH OR IMPLANT GENERAL: Performed by: ORTHOPAEDIC SURGERY

## 2017-09-18 PROCEDURE — 36000093 ZZH SURGERY LEVEL 4 1ST 30 MIN: Performed by: ORTHOPAEDIC SURGERY

## 2017-09-18 PROCEDURE — 25000125 ZZHC RX 250: Performed by: ORTHOPAEDIC SURGERY

## 2017-09-18 PROCEDURE — 27210995 ZZH RX 272: Performed by: ORTHOPAEDIC SURGERY

## 2017-09-18 PROCEDURE — 80053 COMPREHEN METABOLIC PANEL: CPT | Performed by: ANESTHESIOLOGY

## 2017-09-18 DEVICE — IMP COMP PATELLA BIOM ARCM 37MM: Type: IMPLANTABLE DEVICE | Site: KNEE | Status: FUNCTIONAL

## 2017-09-18 DEVICE — IMPLANTABLE DEVICE: Type: IMPLANTABLE DEVICE | Site: KNEE | Status: FUNCTIONAL

## 2017-09-18 DEVICE — BONE CEMENT RADIOPAQUE SIMPLEX HV FULL DOSE 6194-1-001: Type: IMPLANTABLE DEVICE | Site: KNEE | Status: FUNCTIONAL

## 2017-09-18 DEVICE — IMP COMP TIBIAL KNEE ASCENT 83MM 141226: Type: IMPLANTABLE DEVICE | Site: KNEE | Status: FUNCTIONAL

## 2017-09-18 RX ORDER — PROCHLORPERAZINE MALEATE 5 MG
5 TABLET ORAL EVERY 6 HOURS PRN
Status: DISCONTINUED | OUTPATIENT
Start: 2017-09-18 | End: 2017-09-21 | Stop reason: HOSPADM

## 2017-09-18 RX ORDER — AMOXICILLIN 250 MG
1-2 CAPSULE ORAL 2 TIMES DAILY
Status: DISCONTINUED | OUTPATIENT
Start: 2017-09-18 | End: 2017-09-21 | Stop reason: HOSPADM

## 2017-09-18 RX ORDER — DEXAMETHASONE SODIUM PHOSPHATE 4 MG/ML
INJECTION, SOLUTION INTRA-ARTICULAR; INTRALESIONAL; INTRAMUSCULAR; INTRAVENOUS; SOFT TISSUE PRN
Status: DISCONTINUED | OUTPATIENT
Start: 2017-09-18 | End: 2017-09-18

## 2017-09-18 RX ORDER — ONDANSETRON 4 MG/1
4 TABLET, ORALLY DISINTEGRATING ORAL EVERY 6 HOURS PRN
Status: DISCONTINUED | OUTPATIENT
Start: 2017-09-18 | End: 2017-09-21 | Stop reason: HOSPADM

## 2017-09-18 RX ORDER — CEFAZOLIN SODIUM 2 G/100ML
2 INJECTION, SOLUTION INTRAVENOUS EVERY 8 HOURS
Status: COMPLETED | OUTPATIENT
Start: 2017-09-18 | End: 2017-09-19

## 2017-09-18 RX ORDER — DIPHENHYDRAMINE HCL 12.5MG/5ML
12.5 LIQUID (ML) ORAL EVERY 6 HOURS PRN
Status: DISCONTINUED | OUTPATIENT
Start: 2017-09-18 | End: 2017-09-21 | Stop reason: HOSPADM

## 2017-09-18 RX ORDER — METOCLOPRAMIDE 5 MG/1
5 TABLET ORAL EVERY 6 HOURS PRN
Status: DISCONTINUED | OUTPATIENT
Start: 2017-09-18 | End: 2017-09-21 | Stop reason: HOSPADM

## 2017-09-18 RX ORDER — DEXTROSE MONOHYDRATE, SODIUM CHLORIDE, AND POTASSIUM CHLORIDE 50; 1.49; 4.5 G/1000ML; G/1000ML; G/1000ML
INJECTION, SOLUTION INTRAVENOUS CONTINUOUS
Status: DISCONTINUED | OUTPATIENT
Start: 2017-09-18 | End: 2017-09-21 | Stop reason: HOSPADM

## 2017-09-18 RX ORDER — FENTANYL CITRATE 50 UG/ML
INJECTION, SOLUTION INTRAMUSCULAR; INTRAVENOUS PRN
Status: DISCONTINUED | OUTPATIENT
Start: 2017-09-18 | End: 2017-09-18

## 2017-09-18 RX ORDER — LIDOCAINE 40 MG/G
CREAM TOPICAL
Status: DISCONTINUED | OUTPATIENT
Start: 2017-09-18 | End: 2017-09-21 | Stop reason: HOSPADM

## 2017-09-18 RX ORDER — ACETAMINOPHEN 500 MG
1000 TABLET ORAL ONCE
Status: COMPLETED | OUTPATIENT
Start: 2017-09-18 | End: 2017-09-18

## 2017-09-18 RX ORDER — NALOXONE HYDROCHLORIDE 0.4 MG/ML
.1-.4 INJECTION, SOLUTION INTRAMUSCULAR; INTRAVENOUS; SUBCUTANEOUS
Status: DISCONTINUED | OUTPATIENT
Start: 2017-09-18 | End: 2017-09-21 | Stop reason: HOSPADM

## 2017-09-18 RX ORDER — ACETAMINOPHEN 325 MG/1
650 TABLET ORAL EVERY 4 HOURS PRN
Status: DISCONTINUED | OUTPATIENT
Start: 2017-09-21 | End: 2017-09-21 | Stop reason: HOSPADM

## 2017-09-18 RX ORDER — METOCLOPRAMIDE HYDROCHLORIDE 5 MG/ML
5 INJECTION INTRAMUSCULAR; INTRAVENOUS EVERY 6 HOURS PRN
Status: DISCONTINUED | OUTPATIENT
Start: 2017-09-18 | End: 2017-09-21 | Stop reason: HOSPADM

## 2017-09-18 RX ORDER — ONDANSETRON 2 MG/ML
4 INJECTION INTRAMUSCULAR; INTRAVENOUS EVERY 6 HOURS PRN
Status: DISCONTINUED | OUTPATIENT
Start: 2017-09-18 | End: 2017-09-21 | Stop reason: HOSPADM

## 2017-09-18 RX ORDER — FENTANYL CITRATE 50 UG/ML
25-50 INJECTION, SOLUTION INTRAMUSCULAR; INTRAVENOUS
Status: DISCONTINUED | OUTPATIENT
Start: 2017-09-18 | End: 2017-09-18 | Stop reason: HOSPADM

## 2017-09-18 RX ORDER — OXYCODONE HCL 10 MG/1
10 TABLET, FILM COATED, EXTENDED RELEASE ORAL EVERY 12 HOURS
Status: DISCONTINUED | OUTPATIENT
Start: 2017-09-18 | End: 2017-09-19

## 2017-09-18 RX ORDER — CEFAZOLIN SODIUM 2 G/100ML
2 INJECTION, SOLUTION INTRAVENOUS
Status: COMPLETED | OUTPATIENT
Start: 2017-09-18 | End: 2017-09-18

## 2017-09-18 RX ORDER — OXYCODONE HYDROCHLORIDE 5 MG/1
5-10 TABLET ORAL
Status: DISCONTINUED | OUTPATIENT
Start: 2017-09-18 | End: 2017-09-19

## 2017-09-18 RX ORDER — ONDANSETRON 2 MG/ML
4 INJECTION INTRAMUSCULAR; INTRAVENOUS EVERY 30 MIN PRN
Status: DISCONTINUED | OUTPATIENT
Start: 2017-09-18 | End: 2017-09-18 | Stop reason: HOSPADM

## 2017-09-18 RX ORDER — ACETAMINOPHEN 325 MG/1
975 TABLET ORAL EVERY 8 HOURS
Status: COMPLETED | OUTPATIENT
Start: 2017-09-18 | End: 2017-09-21

## 2017-09-18 RX ORDER — EPHEDRINE SULFATE 50 MG/ML
INJECTION, SOLUTION INTRAMUSCULAR; INTRAVENOUS; SUBCUTANEOUS PRN
Status: DISCONTINUED | OUTPATIENT
Start: 2017-09-18 | End: 2017-09-18

## 2017-09-18 RX ORDER — PREDNISOLONE ACETATE 10 MG/ML
1 SUSPENSION/ DROPS OPHTHALMIC AT BEDTIME
Status: DISCONTINUED | OUTPATIENT
Start: 2017-09-18 | End: 2017-09-18

## 2017-09-18 RX ORDER — MAGNESIUM HYDROXIDE 1200 MG/15ML
LIQUID ORAL PRN
Status: DISCONTINUED | OUTPATIENT
Start: 2017-09-18 | End: 2017-09-18 | Stop reason: HOSPADM

## 2017-09-18 RX ORDER — PROPOFOL 10 MG/ML
INJECTION, EMULSION INTRAVENOUS CONTINUOUS PRN
Status: DISCONTINUED | OUTPATIENT
Start: 2017-09-18 | End: 2017-09-18

## 2017-09-18 RX ORDER — SODIUM CHLORIDE, SODIUM LACTATE, POTASSIUM CHLORIDE, CALCIUM CHLORIDE 600; 310; 30; 20 MG/100ML; MG/100ML; MG/100ML; MG/100ML
INJECTION, SOLUTION INTRAVENOUS CONTINUOUS
Status: DISCONTINUED | OUTPATIENT
Start: 2017-09-18 | End: 2017-09-18 | Stop reason: HOSPADM

## 2017-09-18 RX ORDER — BUPIVACAINE HYDROCHLORIDE 7.5 MG/ML
INJECTION, SOLUTION INTRASPINAL PRN
Status: DISCONTINUED | OUTPATIENT
Start: 2017-09-18 | End: 2017-09-18

## 2017-09-18 RX ORDER — HYDROMORPHONE HYDROCHLORIDE 1 MG/ML
.3-.5 INJECTION, SOLUTION INTRAMUSCULAR; INTRAVENOUS; SUBCUTANEOUS EVERY 5 MIN PRN
Status: DISCONTINUED | OUTPATIENT
Start: 2017-09-18 | End: 2017-09-18 | Stop reason: HOSPADM

## 2017-09-18 RX ORDER — OXYCODONE HCL 10 MG/1
10 TABLET, FILM COATED, EXTENDED RELEASE ORAL ONCE
Status: COMPLETED | OUTPATIENT
Start: 2017-09-18 | End: 2017-09-18

## 2017-09-18 RX ORDER — HYDROMORPHONE HYDROCHLORIDE 1 MG/ML
.3-.5 INJECTION, SOLUTION INTRAMUSCULAR; INTRAVENOUS; SUBCUTANEOUS
Status: DISCONTINUED | OUTPATIENT
Start: 2017-09-18 | End: 2017-09-21 | Stop reason: HOSPADM

## 2017-09-18 RX ORDER — NALOXONE HYDROCHLORIDE 0.4 MG/ML
.1-.4 INJECTION, SOLUTION INTRAMUSCULAR; INTRAVENOUS; SUBCUTANEOUS
Status: DISCONTINUED | OUTPATIENT
Start: 2017-09-18 | End: 2017-09-18

## 2017-09-18 RX ORDER — SIMVASTATIN 10 MG
10 TABLET ORAL AT BEDTIME
Status: DISCONTINUED | OUTPATIENT
Start: 2017-09-18 | End: 2017-09-21 | Stop reason: HOSPADM

## 2017-09-18 RX ORDER — PROPOFOL 10 MG/ML
INJECTION, EMULSION INTRAVENOUS PRN
Status: DISCONTINUED | OUTPATIENT
Start: 2017-09-18 | End: 2017-09-18

## 2017-09-18 RX ORDER — PREDNISOLONE ACETATE 10 MG/ML
1 SUSPENSION/ DROPS OPHTHALMIC AT BEDTIME
Status: DISCONTINUED | OUTPATIENT
Start: 2017-09-18 | End: 2017-09-21 | Stop reason: HOSPADM

## 2017-09-18 RX ORDER — ONDANSETRON 4 MG/1
4 TABLET, ORALLY DISINTEGRATING ORAL EVERY 30 MIN PRN
Status: DISCONTINUED | OUTPATIENT
Start: 2017-09-18 | End: 2017-09-18 | Stop reason: HOSPADM

## 2017-09-18 RX ORDER — DIPHENHYDRAMINE HCL 25 MG
25 CAPSULE ORAL EVERY 4 HOURS PRN
Status: DISCONTINUED | OUTPATIENT
Start: 2017-09-18 | End: 2017-09-21 | Stop reason: HOSPADM

## 2017-09-18 RX ORDER — ONDANSETRON 2 MG/ML
INJECTION INTRAMUSCULAR; INTRAVENOUS PRN
Status: DISCONTINUED | OUTPATIENT
Start: 2017-09-18 | End: 2017-09-18

## 2017-09-18 RX ORDER — FENTANYL CITRATE 50 UG/ML
100 INJECTION, SOLUTION INTRAMUSCULAR; INTRAVENOUS
Status: COMPLETED | OUTPATIENT
Start: 2017-09-18 | End: 2017-09-18

## 2017-09-18 RX ORDER — DIPHENHYDRAMINE HYDROCHLORIDE 50 MG/ML
12.5 INJECTION INTRAMUSCULAR; INTRAVENOUS EVERY 6 HOURS PRN
Status: DISCONTINUED | OUTPATIENT
Start: 2017-09-18 | End: 2017-09-21 | Stop reason: HOSPADM

## 2017-09-18 RX ORDER — CEFAZOLIN SODIUM 1 G/3ML
1 INJECTION, POWDER, FOR SOLUTION INTRAMUSCULAR; INTRAVENOUS SEE ADMIN INSTRUCTIONS
Status: DISCONTINUED | OUTPATIENT
Start: 2017-09-18 | End: 2017-09-18 | Stop reason: HOSPADM

## 2017-09-18 RX ADMIN — FENTANYL CITRATE 50 MCG: 50 INJECTION, SOLUTION INTRAMUSCULAR; INTRAVENOUS at 12:26

## 2017-09-18 RX ADMIN — SODIUM CHLORIDE, POTASSIUM CHLORIDE, SODIUM LACTATE AND CALCIUM CHLORIDE: 600; 310; 30; 20 INJECTION, SOLUTION INTRAVENOUS at 11:02

## 2017-09-18 RX ADMIN — SODIUM CHLORIDE, POTASSIUM CHLORIDE, SODIUM LACTATE AND CALCIUM CHLORIDE: 600; 310; 30; 20 INJECTION, SOLUTION INTRAVENOUS at 15:24

## 2017-09-18 RX ADMIN — OXYCODONE HYDROCHLORIDE 10 MG: 10 TABLET, FILM COATED, EXTENDED RELEASE ORAL at 11:01

## 2017-09-18 RX ADMIN — CEFAZOLIN SODIUM 2 G: 2 INJECTION, SOLUTION INTRAVENOUS at 19:50

## 2017-09-18 RX ADMIN — PHENYLEPHRINE HYDROCHLORIDE 100 MCG: 10 INJECTION, SOLUTION INTRAMUSCULAR; INTRAVENOUS; SUBCUTANEOUS at 13:02

## 2017-09-18 RX ADMIN — ROPIVACAINE HYDROCHLORIDE 40 ML GIVEN: 5 INJECTION, SOLUTION EPIDURAL; INFILTRATION; PERINEURAL at 12:02

## 2017-09-18 RX ADMIN — SODIUM CHLORIDE 1000 ML: 0.9 IRRIGANT IRRIGATION at 12:58

## 2017-09-18 RX ADMIN — Medication: at 14:57

## 2017-09-18 RX ADMIN — ACETAMINOPHEN 975 MG: 325 TABLET, FILM COATED ORAL at 21:20

## 2017-09-18 RX ADMIN — PHENYLEPHRINE HYDROCHLORIDE 100 MCG: 10 INJECTION, SOLUTION INTRAMUSCULAR; INTRAVENOUS; SUBCUTANEOUS at 13:29

## 2017-09-18 RX ADMIN — CEFAZOLIN SODIUM 2 G: 2 INJECTION, SOLUTION INTRAVENOUS at 12:32

## 2017-09-18 RX ADMIN — TRANEXAMIC ACID 1 G: 100 INJECTION, SOLUTION INTRAVENOUS at 12:41

## 2017-09-18 RX ADMIN — POTASSIUM CHLORIDE, DEXTROSE MONOHYDRATE AND SODIUM CHLORIDE: 150; 5; 450 INJECTION, SOLUTION INTRAVENOUS at 18:30

## 2017-09-18 RX ADMIN — DEXAMETHASONE SODIUM PHOSPHATE 4 MG: 4 INJECTION, SOLUTION INTRA-ARTICULAR; INTRALESIONAL; INTRAMUSCULAR; INTRAVENOUS; SOFT TISSUE at 12:44

## 2017-09-18 RX ADMIN — MIDAZOLAM HYDROCHLORIDE 1 MG: 1 INJECTION, SOLUTION INTRAMUSCULAR; INTRAVENOUS at 12:36

## 2017-09-18 RX ADMIN — DEXMEDETOMIDINE HYDROCHLORIDE 8 MCG: 100 INJECTION, SOLUTION INTRAVENOUS at 12:33

## 2017-09-18 RX ADMIN — SIMVASTATIN 10 MG: 10 TABLET, FILM COATED ORAL at 21:20

## 2017-09-18 RX ADMIN — Medication 10 MG: at 13:31

## 2017-09-18 RX ADMIN — FENTANYL CITRATE 100 MCG: 50 INJECTION, SOLUTION INTRAMUSCULAR; INTRAVENOUS at 11:59

## 2017-09-18 RX ADMIN — SENNOSIDES AND DOCUSATE SODIUM 2 TABLET: 8.6; 5 TABLET ORAL at 21:20

## 2017-09-18 RX ADMIN — SODIUM CHLORIDE, POTASSIUM CHLORIDE, SODIUM LACTATE AND CALCIUM CHLORIDE: 600; 310; 30; 20 INJECTION, SOLUTION INTRAVENOUS at 13:05

## 2017-09-18 RX ADMIN — PREDNISOLONE ACETATE 1 DROP: 10 SUSPENSION/ DROPS OPHTHALMIC at 21:20

## 2017-09-18 RX ADMIN — GENTAMICIN SULFATE 1000 ML: 40 INJECTION, SOLUTION INTRAMUSCULAR; INTRAVENOUS at 12:57

## 2017-09-18 RX ADMIN — HYDROMORPHONE HYDROCHLORIDE 0.5 MG: 1 INJECTION, SOLUTION INTRAMUSCULAR; INTRAVENOUS; SUBCUTANEOUS at 18:29

## 2017-09-18 RX ADMIN — OXYCODONE HYDROCHLORIDE 10 MG: 10 TABLET, FILM COATED, EXTENDED RELEASE ORAL at 21:20

## 2017-09-18 RX ADMIN — PROPOFOL 100 MCG/KG/MIN: 10 INJECTION, EMULSION INTRAVENOUS at 12:33

## 2017-09-18 RX ADMIN — BUPIVACAINE HYDROCHLORIDE IN DEXTROSE 12 MG: 7.5 INJECTION, SOLUTION SUBARACHNOID at 12:28

## 2017-09-18 RX ADMIN — MIDAZOLAM HYDROCHLORIDE 1 MG: 1 INJECTION, SOLUTION INTRAMUSCULAR; INTRAVENOUS at 12:26

## 2017-09-18 RX ADMIN — ONDANSETRON 4 MG: 2 INJECTION INTRAMUSCULAR; INTRAVENOUS at 13:27

## 2017-09-18 RX ADMIN — ACETAMINOPHEN 1000 MG: 500 TABLET, FILM COATED ORAL at 11:01

## 2017-09-18 RX ADMIN — PROPOFOL 40 MG: 10 INJECTION, EMULSION INTRAVENOUS at 12:51

## 2017-09-18 NOTE — IP AVS SNAPSHOT
MRN:[id number]                      After Visit Summary   9/18/2017    Walker Ortiz    MRN: [id number]           Thank you!     Thank you for choosing [facility] for your care. Our goal is always to provide you with excellent care. Hearing back from our patients is one way we can continue to improve our services. Please take a few minutes to complete the written survey that you may receive in the mail after you visit with us. Thank you!        Patient Information     Date Of Birth          1950        Designated Caregiver       Most Recent Value    Caregiver    Will someone help with your care after discharge? yes    Name of designated caregiver Ulises    Phone number of caregiver [phone]    Caregiver address [address] [place] [address]      About your hospital stay     You were admitted on:  September 18, 2017 You last received care in the:  [place] [facility] [address] [facility]    You were discharged on:  September 21, 2017        Reason for your hospital stay       S/p right TKA  Pain control  Therapy  Antibiotics                  Who to Call     For medical emergencies, please call 911.  For non-urgent questions about your medical care, please call your primary care provider or clinic, [phone]  For questions related to your surgery, please call your surgery clinic        Attending Provider     Provider Specialty    Dominik Ramirez MD Orthopedics       Primary Care Provider Office Phone # Fax #    Bhavana Mullen [phone]      After Care Instructions     Activity       Your activity upon discharge: activity as tolerated. No driving. Outpatient physical therapy            Diet       Follow this diet upon discharge: Orders Placed This Encounter      Regular Diet Adult            Wound care and dressings       Instructions to care for your wound at home: keep wound clean and dry.                  Follow-up Appointments     Follow-up and  "recommended labs and tests        Follow up with me,  Dr Ramirez, in two weeks. to evaluate after surgery.  No follow up labs or test are needed.                  Your next 10 appointments already scheduled     Sep 22, 2017 12:00 PM CDT   (Arrive by 11:45 AM)   ALBA Extremity with Leatha Enamorado, PT   [facility] ([facility]  )    [address] [place] [address] [address]  [facility] [address]   [phone]            Sep 25, 2017 12:00 PM CDT   ALBA Extremity with Leatha Enamorado, PT   [facility] [place] [place] [facility] ([facility] [place]  )    [address] [place] [address] [address]  [facility] [address]   [phone]            Sep 27, 2017 11:40 AM CDT   ALBA Extremity with Leatha Enamorado, PT   [facility] [place] [facility] ([facility] [place]  )    [address] [place], [place] [address]  [facility] [address]   [phone]              Further instructions from your care team       TOTAL KNEE REPLACEMENT TAKE HOME INSTRUCTIONS  Your surgeon will answer any questions about your progress. General guidelines for your care are listed below. Your surgeon may give additional instructions for your care at home. Please follow them carefully    Activity Level  1. Physical activity may be resumed gradually according to your comfort level and your surgeon s instructions. Follow your exercise program as instructed by your therapist. Do exercises at least twice a day. you may ice your knee after exercising.  2. Complete exercises two hours before bedtime to minimize the effect pain may have on sleep.  Refer to pages 19-22 of you \"Total Knee Replacement\" booklet for details  3. Do not wear your knee immobilizer unless your doctor has specifically told you to continue it.    Good Health Practices  1. Maintain an adequate fluid intake and eat a well balanced diet.  2. Be sure to include the basic food groups, such as dairy products, meat/fish, vegetables, and fruit. Each of these " foods contribute to your wound healing and increasing your strength.  3. Surgery, decreased activity and pain medication all contribute to a decrease in bowel activity that can result in constipation. It is recommended that you increase your liquid intake, add fiber to your diet, increase activity, and decrease pain medication use. If you have any problems, notify your physician.  4. Wear your anti-embolism stockings day and night until seen by your surgeon if you were issued these at discharge.  (Not all patients will have anti-embolism stockings) Remove twice a day for one hour at a time. You may hand wash and air dry your stockings.  5. Notify your dentist of your total knee surgery and call your dentist one week before a dental appointment for antibiotics, if your dentist will not prescribe antibiotics then call your surgeon to ask for next steps.      Incision/Dressing Care  1. Keep incision clean and dry per surgeons instructions  2. Cover incision if you are still having drainage.  3.  If you have a waterproof dressing __________________________   Shower.    Things to Watch For  1. Check incision daily for increased redness, tenderness, swelling, or drainage along the incision line. If these occur, please notify your doctor. Also, call if you develop a fever above 101 .  2. Please notify your doctor if you experience any calf pain and/or if you have surgical pain not relieved by the pain medication prescribed by your doctor.            Pending Results     No orders found from 9/16/2017 to 9/19/2017.            Statement of Approval     Ordered          09/20/17 0848  I have reviewed and agree with all the recommendations and orders detailed in this document.  EFFECTIVE NOW     Approved and electronically signed by:  Dominik Ramirez MD             Admission Information     Date & Time Provider Department Dept. Phone    9/18/2017 Dominik Ramirez MD [place] [address] [facility]ty  "Unit [phone]      Your Vitals Were     Blood Pressure Temperature Respirations Height Weight Pulse Oximetry    140/92 (BP Location: Left arm) 98.2  F (36.8  C) (Oral) 16 1.753 m (5' 9\") 94.8 kg (209 lb) 93%    BMI (Body Mass Index)                   30.86 kg/m2           [employer] Information     [employer] lets you send messages to your doctor, view your test results, renew your prescriptions, schedule appointments and more. To sign up, go to [email or web address] . Click on \"Log in\" on the left side of the screen, which will take you to the Welcome page. Then click on \"Sign up Now\" on the right side of the page.     You will be asked to enter the access code listed below, as well as some personal information. Please follow the directions to create your username and password.     Your access code is: [email or web address]  Expires: 2017  4:01 PM     Your access code will  in 90 days. If you need help or a new code, please call your [facility] clinic or [phone].        Care EveryWhere ID     This is your Care EveryWhere ID. This could be used by other organizations to access your [facility] medical records  [id number]        Equal Access to Services     COSTA NOLAND AH: Melissa Peck, waaxda luqadaha, qaybta kaalmada adeabel, saadia contreras. So [facility] [phone].    ATENCIÓN: Si habla español, tiene a cheney disposición servicios gratuitos de asistencia lingüística. Llame al [phone].    We comply with applicable federal civil rights laws and [place] laws. We do not discriminate on the basis of race, color, national origin, age, disability sex, sexual orientation or gender identity.               Review of your medicines      START taking        Dose / Directions    enoxaparin 40 MG/0.4ML injection   Commonly known as:  LOVENOX   Used for:  Right knee pain, unspecified chronicity        Dose:  40 mg   Inject 0.4 mLs (40 mg) Subcutaneous every 24 hours for 10 days "   Quantity:  4 mL   Refills:  0       HYDROmorphone 2 MG tablet   Commonly known as:  DILAUDID   Used for:  Right knee pain, unspecified chronicity        Dose:  2-4 mg   Take 1-2 tablets (2-4 mg) by mouth every 3 hours as needed for moderate to severe pain   Quantity:  60 tablet   Refills:  0       senna-docusate 8.6-50 MG per tablet   Commonly known as:  SENOKOT-S;PERICOLACE   Used for:  Right knee pain, unspecified chronicity        Dose:  1-2 tablet   Take 1-2 tablets by mouth 2 times daily as needed for constipation   Quantity:  60 tablet   Refills:  0         CONTINUE these medicines which have NOT CHANGED        Dose / Directions    BENADRYL PO        Dose:  25 mg   Take 25 mg by mouth every 4 hours as needed for allergies   Refills:  0       prednisoLONE acetate 1 % ophthalmic susp   Commonly known as:  PRED FORTE        Dose:  1 drop   Place 1 drop Into the left eye At Bedtime   Refills:  5       ZOCOR PO        Dose:  10 mg   Take 10 mg by mouth At Bedtime   Refills:  0         STOP taking     ALEVE PO                Where to get your medicines      These medications were sent to [facility] [place]  [place], [place] - [address]  [address], [place] [address]     Phone:  [phone]     enoxaparin 40 MG/0.4ML injection    senna-docusate 8.6-50 MG per tablet         Some of these will need a paper prescription and others can be bought over the counter. Ask your nurse if you have questions.     Bring a paper prescription for each of these medications     HYDROmorphone 2 MG tablet                Protect others around you: Learn how to safely use, store and throw away your medicines at www.disposemymeds.org.             Medication List: This is a list of all your medications and when to take them. Check marks below indicate your daily home schedule. Keep this list as a reference.      Medications           Morning Afternoon Evening Bedtime As Needed    BENADRYL PO   Take 25 mg by mouth  every 4 hours as needed for allergies   Next Dose Due:  Resume on discharge.                                 enoxaparin 40 MG/0.4ML injection   Commonly known as:  LOVENOX   Inject 0.4 mLs (40 mg) Subcutaneous every 24 hours for 10 days   Last time this was given:  40 mg on 9/21/2017 11:53 AM   Next Dose Due:  9/22/17                                   HYDROmorphone 2 MG tablet   Commonly known as:  DILAUDID   Take 1-2 tablets (2-4 mg) by mouth every 3 hours as needed for moderate to severe pain   Last time this was given:  2 mg on 9/21/2017  1:03 PM   Next Dose Due:  9/21/17                                   prednisoLONE acetate 1 % ophthalmic susp   Commonly known as:  PRED FORTE   Place 1 drop Into the left eye At Bedtime   Last time this was given:  1 drop on 9/20/2017  9:00 PM   Next Dose Due:  Resume on discharge.                                 senna-docusate 8.6-50 MG per tablet   Commonly known as:  SENOKOT-S;PERICOLACE   Take 1-2 tablets by mouth 2 times daily as needed for constipation   Last time this was given:  2 tablets on 9/21/2017  8:26 AM   Next Dose Due:  9/21/17                                   ZOCOR PO   Take 10 mg by mouth At Bedtime   Last time this was given:  10 mg on 9/20/2017  9:00 PM   Next Dose Due:  9/21/17

## 2017-09-18 NOTE — ANESTHESIA POSTPROCEDURE EVALUATION
Patient: Walker Ortiz    Procedure(s):  RIGHT TOTAL KNEE ARTHROPLASTY  - Wound Class: I-Clean    Diagnosis:DJD RIGHT KNEE  Diagnosis Additional Information: No value filed.    Anesthesia Type:  Spinal    Note:  Anesthesia Post Evaluation    Patient location during evaluation: PACU  Patient participation: Able to fully participate in evaluation  Level of consciousness: awake  Pain management: adequate  Airway patency: patent  Cardiovascular status: acceptable  Respiratory status: acceptable  Hydration status: acceptable  PONV: none     Anesthetic complications: None          Last vitals:  Vitals:    09/18/17 1530 09/18/17 1540 09/18/17 1547   BP: 124/86 121/87    Resp: 12 12    Temp:      SpO2: 94% 94% 97%         Electronically Signed By: Ryanne Jennings  September 18, 2017  3:48 PM

## 2017-09-18 NOTE — IP AVS SNAPSHOT
[address] Specialty Unit    [address] [place] [address]    Phone:  [phone]                                       After Visit Summary   9/18/2017    Walker Ortiz    MRN: [id number]           After Visit Summary Signature Page     I have received my discharge instructions, and my questions have been answered. I have discussed any challenges I see with this plan with the nurse or doctor.    ..........................................................................................................................................  Patient/Patient Representative Signature      ..........................................................................................................................................  Patient Representative Print Name and Relationship to Patient    ..................................................               ................................................  Date                                            Time    ..........................................................................................................................................  Reviewed by Signature/Title    ...................................................              ..............................................  Date                                                            Time

## 2017-09-18 NOTE — BRIEF OP NOTE
[facility] Brief Operative Note    Pre-operative diagnosis: DJD RIGHT KNEE   Post-operative diagnosis osteoartheritis right knee      Procedure: Procedure(s):  RIGHT TOTAL KNEE ARTHROPLASTY  - Wound Class: I-Clean   Surgeon(s): Surgeon(s) and Role:     * Dominik Ramirez MD - Primary     * Yelena Pimentel PA-C - Assisting   Estimated blood loss: 25 mL    Specimens: * No specimens in log *   Findings: Please see the full operative report.

## 2017-09-18 NOTE — PROGRESS NOTES
Admission medication history interview status for the 9/18/2017  admission is complete. See EPIC admission navigator for prior to admission medications     Medication history source reliability:Good    Medication history interview source(s):Patient    Medication history resources (including written lists, pill bottles, clinic record):Patient mailed in a med list prior to day of procedure.    Primary pharmacy.[facility], [place]    Additional medication history information not noted on PTA med list :None    Time spent in this activity: 30 minutes    Prior to Admission medications    Medication Sig Last Dose Taking? Auth Provider   DiphenhydrAMINE HCl (BENADRYL PO) Take 25 mg by mouth every 4 hours as needed for allergies 9/17/2017 at 2100 Yes Reported, Patient   Simvastatin (ZOCOR PO) Take 10 mg by mouth At Bedtime 9/17/2017 at 2100 Yes Reported, Patient   Naproxen Sodium (ALEVE PO) Take 220 mg by mouth 2 times daily as needed for moderate pain  Over 2 weeks ago at prn Yes Reported, Patient   prednisoLONE acetate (PRED FORTE) 1 % ophthalmic suspension Place 1 drop Into the left eye At Bedtime  9/17/2017 at hs Yes Reported, Patient

## 2017-09-18 NOTE — ANESTHESIA CARE TRANSFER NOTE
Patient: Walker Ortiz    Procedure(s):  RIGHT TOTAL KNEE ARTHROPLASTY  - Wound Class: I-Clean    Diagnosis: DJD RIGHT KNEE  Diagnosis Additional Information: No value filed.    Anesthesia Type:   Spinal     Note:  Airway :Face Mask  Patient transferred to:PACU  Comments: Pt exhibits spont resps, all monitors and alarms on in pacu, report given to RN, vss.      Vitals: (Last set prior to Anesthesia Care Transfer)    CRNA VITALS  9/18/2017 1312 - 9/18/2017 1348      9/18/2017             Resp Rate (set): 10                Electronically Signed By: PENNY Ibrahim CRNA  September 18, 2017  1:48 PM

## 2017-09-18 NOTE — OP NOTE
Walker ROWDY Ortiz    9/18/2017    PRE-OP DX:  End stage arthritis right knee    POST-OP DX:  Same    PROCEDURE:  right total knee replacement    SURGEON: James               ASSISTANT:  Basia Baez PA-C    ANESTHESIA: Spinal, femoral block      DESCRIPTION OF PROCEDURE:  The patient received a right femoral nerve catheter in the pre-op area. IV Ancef was started. On arrival to the room Spinal anesthesia was induced. A tourniquet was placed on the right thigh. The limb was prepped and draped in customary fashion. The limb was exsanguinated by elevation, and the tourniquet was inflated to 350 mm Hg pressure.    A midline longitudianal incision was made, and carried down to the extensor mechanism. Full thickness flaps were raised only to the extent necessary. The was entered via a medial parapatellar arthrotomy. The lateral patella femoral ligaments were divided, and the knee was positioned hyperflexed with the patella everted.    There was exposed bone over the medial femoral condyle and medial tibial plateau. There was bone loss at medial tibial plateau. The ACL was Present and Absent. The medial meniscus was torn.    Subperiosteal dissection was performed over the proximal medial tibial metaphysis. Meniscectomies were performed. The ACL was divided. The fat pad was excised.    Attention is turned first to the femur. A drill hole was placed above the femoral notch, and the intramedullary guide was placed. This was used to position the distal femoral cutting block, which was pinned in place After medial and lateral condylar drill holes were made, the intramedullary guide was removed. The distal femur was cut. Based on intra-operative measurements, we elected to use the size 75 mm Biomet AGC femoral component. The jig was used to make anterior, posterior, and chamfer cuts.   The trial component fit well.    Attention was then turned to the tibia. The extramedullary guide  Was used to position the proximal tibia  cutting guide. As this was a varus knee, the cut was somewhat thicker on the lateral side. Care was taken to preserve the PCL as the proximal tibia was resected. The Biomet maxim size 83 mm trial fit best.  The jigs were used to create the intramedullary hole. The best combination of motion and stability was obtained with the 16 mm tibial insert.     Finally, attention was turned to the patella. The articular surface was resected, and a central drill hole was made. The biomet arcom size 37 mm trial patella fit best. Based on patella tracking, lateral retinacular release was not indicated.    While cement was mixed on the back table, all trial components were removed. The bony interstices were cleansed with poly anti-microbial solution and pulsatile lavage. The bones were dried with suctioning and sponging.    Cementing was begun. First the biomet maxim size 83 mm tibial component was cemented. Next, the Biomet AGC size 75 mm right femoral component was cemented. Excess cement was removed, a trial insert was placed, and the knee was placed in full extension. Lastly, the Biomet arcom size 37 mm patella was cemented and held with a patella clamp. Again excess cement was removed, and the knee was maintained in full extension until the cement was thoroughly hardened.    The trial tibial insert was removed. Final inspection for extraneous cement was performed, and final antibiotic irrigation was performed. The real 16 mm tibial insert was placed and held with the locking clip. Femoral tibial motion, stability and rollback were excellent. Patella femoral tracking was quite satisfactory.        We elected to close. Two medium hemovac drains were brought out via stab incisions. The capsule was closed with #1 vicryl interrupted figure eight sutures, two layers above the patella, and one layer from the patella down. The subcutaneous tissues were closed with 2-0 vicryl, and the skin was re-approximated with skin clips. A bulky  sterile dressing, ace wrap, and knee immobilizer were applied. The patient was taken to the PACU in satisfactory condition. Final counts were correct.    All CMS PQRI guidelines will be followed. Ancef will be discontinued in less than 24 hours. Anticoagulation with Lovenox will commence.

## 2017-09-18 NOTE — ANESTHESIA PROCEDURE NOTES
Peripheral nerve/Neuraxial procedure note : femoral and Femoral  Pre-Procedure  Performed by SOFIYA TELLES  Location: pre-op      Pre-Anesthestic Checklist: patient identified, IV checked, site marked, risks and benefits discussed, informed consent, monitors and equipment checked, at physician/surgeon's request and post-op pain management    Timeout  Correct Patient: Yes   Correct Procedure: Yes   Correct Site: Yes   Correct Laterality: Yes   Correct Position: Yes   Site Marked: Yes   .   Procedure Documentation    .    Procedure:    Femoral and Femoral.  Local skin infiltrated with 5 mL of 1% lidocaine.     Ultrasound used to identify targeted nerve, plexus, or vascular marker and placed a needle adjacent to it., Ultrasound was used to visualize the spread of the anesthetic in close proximity to the above stated nerve. A permanent image is entered into the patient's record.  Patient Prep;mask, sterile gloves, chlorhexidine gluconate and isopropyl alcohol, patient draped.  .  Needle: insulated, short bevel Needle Gauge: 17.    Needle Length (Inches) 2  Insertion Method: Continuous Infusion.   Catheter: 20 G . .  Catheter threaded easily  .  .   .    Assessment/Narrative  Paresthesias: No.  .  The placement was negative for: blood aspirated, painful injection and site bleeding.  Bolus given via needle. No blood aspirated via catheter.   Secured via Tunneling.   Complications: none. Comments:  Continuous Femoral Nerve Block  40 ml 0.5% Ropivacaine with 1:400,000 Epinephrine

## 2017-09-18 NOTE — ANESTHESIA PREPROCEDURE EVALUATION
Anesthesia Evaluation     . Pt has had prior anesthetic.     No history of anesthetic complications          ROS/MED HX    ENT/Pulmonary:      (-) sleep apnea   Neurologic:       Cardiovascular:         METS/Exercise Tolerance:     Hematologic:         Musculoskeletal:         GI/Hepatic:        (-) GERD   Renal/Genitourinary:     (+) chronic renal disease,       Endo:         Psychiatric:         Infectious Disease:         Malignancy:         Other:                     Physical Exam  Normal systems: dental    Airway   Mallampati: II  TM distance: >3 FB  Neck ROM: full    Dental     Cardiovascular   Rhythm and rate: regular      Pulmonary    breath sounds clear to auscultation                    Anesthesia Plan      History & Physical Review      ASA Status:  2 .        Plan for Spinal   PONV prophylaxis:  Ondansetron (or other 5HT-3) and Dexamethasone or Solumedrol       Postoperative Care  Postoperative pain management:  IV analgesics and Peripheral nerve block (Continuous).      Consents  Anesthetic plan, risks, benefits and alternatives discussed with:  Patient..                          .

## 2017-09-19 ENCOUNTER — APPOINTMENT (OUTPATIENT)
Dept: PHYSICAL THERAPY | Facility: CLINIC | Age: 67
DRG: 470 | End: 2017-09-19
Attending: ORTHOPAEDIC SURGERY
Payer: COMMERCIAL

## 2017-09-19 LAB
GLUCOSE SERPL-MCNC: 119 MG/DL (ref 70–99)
HGB BLD-MCNC: 13.6 G/DL (ref 13.3–17.7)

## 2017-09-19 PROCEDURE — 97162 PT EVAL MOD COMPLEX 30 MIN: CPT | Mod: GP | Performed by: PHYSICAL THERAPIST

## 2017-09-19 PROCEDURE — 82947 ASSAY GLUCOSE BLOOD QUANT: CPT | Performed by: ORTHOPAEDIC SURGERY

## 2017-09-19 PROCEDURE — 85018 HEMOGLOBIN: CPT | Performed by: ORTHOPAEDIC SURGERY

## 2017-09-19 PROCEDURE — 25000132 ZZH RX MED GY IP 250 OP 250 PS 637: Mod: GY | Performed by: ORTHOPAEDIC SURGERY

## 2017-09-19 PROCEDURE — 97110 THERAPEUTIC EXERCISES: CPT | Mod: GP | Performed by: PHYSICAL THERAPIST

## 2017-09-19 PROCEDURE — 36415 COLL VENOUS BLD VENIPUNCTURE: CPT | Performed by: ORTHOPAEDIC SURGERY

## 2017-09-19 PROCEDURE — 25000128 H RX IP 250 OP 636: Performed by: ORTHOPAEDIC SURGERY

## 2017-09-19 PROCEDURE — A9270 NON-COVERED ITEM OR SERVICE: HCPCS | Mod: GY | Performed by: ORTHOPAEDIC SURGERY

## 2017-09-19 PROCEDURE — 97530 THERAPEUTIC ACTIVITIES: CPT | Mod: GP | Performed by: PHYSICAL THERAPIST

## 2017-09-19 PROCEDURE — 40000193 ZZH STATISTIC PT WARD VISIT: Performed by: PHYSICAL THERAPIST

## 2017-09-19 PROCEDURE — 25800025 ZZH RX 258: Performed by: ORTHOPAEDIC SURGERY

## 2017-09-19 PROCEDURE — 97116 GAIT TRAINING THERAPY: CPT | Mod: GP | Performed by: PHYSICAL THERAPIST

## 2017-09-19 PROCEDURE — 12000007 ZZH R&B INTERMEDIATE

## 2017-09-19 RX ORDER — HYDROMORPHONE HYDROCHLORIDE 2 MG/1
2-4 TABLET ORAL
Status: DISCONTINUED | OUTPATIENT
Start: 2017-09-19 | End: 2017-09-21 | Stop reason: HOSPADM

## 2017-09-19 RX ADMIN — CEFAZOLIN SODIUM 2 G: 2 INJECTION, SOLUTION INTRAVENOUS at 04:21

## 2017-09-19 RX ADMIN — ACETAMINOPHEN 975 MG: 325 TABLET, FILM COATED ORAL at 13:17

## 2017-09-19 RX ADMIN — ACETAMINOPHEN 975 MG: 325 TABLET, FILM COATED ORAL at 07:14

## 2017-09-19 RX ADMIN — PREDNISOLONE ACETATE 1 DROP: 10 SUSPENSION/ DROPS OPHTHALMIC at 20:57

## 2017-09-19 RX ADMIN — SIMVASTATIN 10 MG: 10 TABLET, FILM COATED ORAL at 20:55

## 2017-09-19 RX ADMIN — HYDROMORPHONE HYDROCHLORIDE 2 MG: 2 TABLET ORAL at 13:17

## 2017-09-19 RX ADMIN — ACETAMINOPHEN 975 MG: 325 TABLET, FILM COATED ORAL at 20:55

## 2017-09-19 RX ADMIN — SENNOSIDES AND DOCUSATE SODIUM 1 TABLET: 8.6; 5 TABLET ORAL at 20:55

## 2017-09-19 RX ADMIN — ENOXAPARIN SODIUM 40 MG: 40 INJECTION SUBCUTANEOUS at 11:41

## 2017-09-19 RX ADMIN — SENNOSIDES AND DOCUSATE SODIUM 1 TABLET: 8.6; 5 TABLET ORAL at 10:48

## 2017-09-19 RX ADMIN — POTASSIUM CHLORIDE, DEXTROSE MONOHYDRATE AND SODIUM CHLORIDE: 150; 5; 450 INJECTION, SOLUTION INTRAVENOUS at 04:18

## 2017-09-19 RX ADMIN — HYDROMORPHONE HYDROCHLORIDE 0.5 MG: 1 INJECTION, SOLUTION INTRAMUSCULAR; INTRAVENOUS; SUBCUTANEOUS at 02:10

## 2017-09-19 NOTE — PLAN OF CARE
Problem: Knee Replacement, Total (Adult)  Goal: Signs and Symptoms of Listed Potential Problems Will be Absent or Manageable (Knee Replacement, Total)  Signs and symptoms of listed potential problems will be absent or manageable by discharge/transition of care (reference Knee Replacement, Total (Adult) CPG).   Outcome: Improving  Pt. A&o, vss, dressing CDI, hvc and rodrigues patent, c-block at 10cc/hr, taking IV dilaudid for pain, Will continue to monitor.

## 2017-09-19 NOTE — PLAN OF CARE
Problem: Goal Outcome Summary  Goal: Goal Outcome Summary  PT-Patient seen for initial eval and treatment initiated. Patient lives in a house with his wife with 2 steps to enter. He is typically active and independent. He still works and does a lot of walking with his job.   Discharge Planner PT   Patient plan for discharge: Home with OPPT  Current status: Patient needs min assist for bed mobility, CGA and verbal cues for transfers and amb. Patient very lightheaded, clammy and nauseated after amb. Unable to get BP as patient moving left arm. This resolved and able to get patient back to bed with assist of nurse. Left in supine with needs close. Right knee ROM 14-80 in p.m.  Barriers to return to prior living situation: 2 steps to enter but do not anticipate this will be a problem.  Recommendations for discharge: To be determined on POD#2  Rationale for recommendations: To be determined POD # 2. .       Entered by: Anneliese Graf 09/19/2017 10:03 AM

## 2017-09-19 NOTE — PLAN OF CARE
Problem: Goal Outcome Summary  Goal: Goal Outcome Summary  Outcome: Improving  VSS, A&Ox4. CMS intact, dressing CDI. Hemovac patent. Voiding adequately. C-block @ 8, declines any other pain interventions. Will continue to monitor.

## 2017-09-19 NOTE — PROGRESS NOTES
Walker Ortiz  2017  POD #1    Doing well.  Pain well-controlled.  Temperatures:  Current - Temp: 97.9  F (36.6  C); Max - Temp  Av.6  F (36.4  C)  Min: 96.8  F (36  C)  Max: 98.6  F (37  C)  Pulse range: No Data Recorded  Blood pressure range: Systolic (24hrs), Av , Min:103 , Max:148   ; Diastolic (24hrs), Av, Min:71, Max:104    CMS: Appears comfortable in bed with knee immobilizer to right lower extremity. Good motion bilateral lower extremities. Distal sensation intact to light touch, capillary refill brisk. Bilateral calves nontender and soft  Labs:  Hemoglobin   Date Value Ref Range Status   2017 15.3 13.3 - 17.7 g/dL Final   ]  New Hemoglobin pending    PLAN:  Continue physical therapy  Pain control measures  Discharge plan: Plan for discharge home 2017 with outpatient therapy

## 2017-09-19 NOTE — PROGRESS NOTES
09/19/17 0852   Quick Adds   Type of Visit Initial PT Evaluation   Living Environment   Lives With spouse   Living Arrangements house   Home Accessibility stairs to enter home;stairs within home   Number of Stairs to Enter Home 2   Number of Stairs Within Home (Flight of steps to the basement. )   Stair Railings at Home (Handicap bar present to enter home. )   Transportation Available family or friend will provide   Self-Care   Usual Activity Tolerance good   Current Activity Tolerance fair   Regular Exercise (Walks a lot in his job. )   Activity/Exercise/Self-Care Comment Patient is I with all ADL's/IADL's.    Functional Level Prior   Ambulation 0-->independent   Transferring 0-->independent   Toileting 0-->independent   Bathing 0-->independent   Dressing 0-->independent   Eating 0-->independent   Communication 0-->understands/communicates without difficulty   Swallowing 0-->swallows foods/liquids without difficulty   Cognition 0 - no cognition issues reported   Fall history within last six months no   Which of the above functional risks had a recent onset or change? none   General Information   Onset of Illness/Injury or Date of Surgery - Date 09/18/17   Referring Physician Dominik Ramirez   Patient/Family Goals Statement To go home with OPPT. Has it scheduled already.    Pertinent History of Current Problem (include personal factors and/or comorbidities that impact the POC) Patient had a right TKA on 9/18/17   Precautions/Limitations fall precautions   Weight-Bearing Status - LUE full weight-bearing   Weight-Bearing Status - RUE full weight-bearing   Weight-Bearing Status - LLE full weight-bearing   Weight-Bearing Status - RLE weight-bearing as tolerated   General Observations Patient very motivated.    General Info Comments Has c-block currently at 8.    Cognitive Status Examination   Orientation orientation to person, place and time   Level of Consciousness alert   Follows Commands and Answers Questions 100%  of the time   Personal Safety and Judgment intact   Memory intact   Pain Assessment   Patient Currently in Pain Yes, see Vital Sign flowsheet  (Pain at rest a 2. )   Integumentary/Edema   Integumentary/Edema Comments Right knee with clean and dry ace bandage and knee immobilizer on it.    Posture    Posture Not impaired   Range of Motion (ROM)   ROM Comment 17-87   Strength   Strength Comments No strength deficits except unable to do a SAQ's or SLR due to quad weakness.    Bed Mobility   Bed Mobility Comments Sup to/from sit with min assist of 1 to manage right LE.    Transfer Skills   Transfer Comments Sit to stand with CGA of 1 and v/c's for techique.    Gait   Gait Comments Gait 5 feet in room with CGA. See daily doc for further details.    Balance   Balance Comments Good but needs support of ww due to right LE weakness and pain.    Sensory Examination   Sensory Perception no deficits were identified   Coordination   Coordination no deficits were identified   Muscle Tone   Muscle Tone no deficits were identified   General Therapy Interventions   Planned Therapy Interventions bed mobility training;gait training;ROM;strengthening;transfer training   Clinical Impression   Criteria for Skilled Therapeutic Intervention yes, treatment indicated   PT Diagnosis impaired functional independence   Influenced by the following impairments Right LE pain and weakness   Functional limitations due to impairments Needs assist with all functional mobility.    Clinical Presentation Evolving/Changing   Clinical Presentation Rationale Patient with significant lightheadedness, clammy and nauseated with dry heaving immediately after amb.    Clinical Decision Making (Complexity) Moderate complexity   Therapy Frequency` 2 times/day   Predicted Duration of Therapy Intervention (days/wks) 3 days   Anticipated Equipment Needs at Discharge (Has a ww at home already from previous TKA on the left. )   Anticipated Discharge Disposition Home  with Outpatient Therapy   Risk & Benefits of therapy have been explained Yes   Patient, Family & other staff in agreement with plan of care Yes   Total Evaluation Time   Total Evaluation Time (Minutes) 15

## 2017-09-19 NOTE — PLAN OF CARE
Problem: Goal Outcome Summary  Goal: Goal Outcome Summary  Outcome: Improving  Patient A&Ox4. VSS on 2L NC overnight. CMS intact. Dressing clean, dry, and intact. Patient dangled at the side of bed, nausea, improved with small sips of water. Pain managed with scheduled OxyContin and PRN Dilaudid. Hemovac intact and patient. Sin discontinued this AM patient due to void. C-block titrated to 8mL/hr prior to PT. Will continue to monitor.

## 2017-09-20 ENCOUNTER — APPOINTMENT (OUTPATIENT)
Dept: PHYSICAL THERAPY | Facility: CLINIC | Age: 67
DRG: 470 | End: 2017-09-20
Attending: ORTHOPAEDIC SURGERY
Payer: COMMERCIAL

## 2017-09-20 LAB
GLUCOSE SERPL-MCNC: 101 MG/DL (ref 70–99)
HGB BLD-MCNC: 13.2 G/DL (ref 13.3–17.7)

## 2017-09-20 PROCEDURE — 97530 THERAPEUTIC ACTIVITIES: CPT | Mod: GP | Performed by: PHYSICAL THERAPY ASSISTANT

## 2017-09-20 PROCEDURE — 97110 THERAPEUTIC EXERCISES: CPT | Mod: GP | Performed by: PHYSICAL THERAPY ASSISTANT

## 2017-09-20 PROCEDURE — 25000128 H RX IP 250 OP 636: Performed by: ORTHOPAEDIC SURGERY

## 2017-09-20 PROCEDURE — 82947 ASSAY GLUCOSE BLOOD QUANT: CPT | Performed by: ORTHOPAEDIC SURGERY

## 2017-09-20 PROCEDURE — 97116 GAIT TRAINING THERAPY: CPT | Mod: GP | Performed by: PHYSICAL THERAPY ASSISTANT

## 2017-09-20 PROCEDURE — 25000132 ZZH RX MED GY IP 250 OP 250 PS 637: Mod: GY | Performed by: ORTHOPAEDIC SURGERY

## 2017-09-20 PROCEDURE — 85018 HEMOGLOBIN: CPT | Performed by: ORTHOPAEDIC SURGERY

## 2017-09-20 PROCEDURE — 12000007 ZZH R&B INTERMEDIATE

## 2017-09-20 PROCEDURE — 36415 COLL VENOUS BLD VENIPUNCTURE: CPT | Performed by: ORTHOPAEDIC SURGERY

## 2017-09-20 PROCEDURE — A9270 NON-COVERED ITEM OR SERVICE: HCPCS | Mod: GY | Performed by: ORTHOPAEDIC SURGERY

## 2017-09-20 PROCEDURE — 40000193 ZZH STATISTIC PT WARD VISIT: Performed by: PHYSICAL THERAPY ASSISTANT

## 2017-09-20 RX ORDER — HYDROMORPHONE HYDROCHLORIDE 2 MG/1
2-4 TABLET ORAL
Qty: 60 TABLET | Refills: 0 | Status: SHIPPED | OUTPATIENT
Start: 2017-09-20 | End: 2018-03-27

## 2017-09-20 RX ORDER — AMOXICILLIN 250 MG
1-2 CAPSULE ORAL 2 TIMES DAILY PRN
Qty: 60 TABLET | Refills: 0 | Status: SHIPPED | OUTPATIENT
Start: 2017-09-20 | End: 2019-03-21

## 2017-09-20 RX ADMIN — ACETAMINOPHEN 975 MG: 325 TABLET, FILM COATED ORAL at 14:15

## 2017-09-20 RX ADMIN — ACETAMINOPHEN 975 MG: 325 TABLET, FILM COATED ORAL at 21:00

## 2017-09-20 RX ADMIN — HYDROMORPHONE HYDROCHLORIDE 2 MG: 2 TABLET ORAL at 10:08

## 2017-09-20 RX ADMIN — HYDROMORPHONE HYDROCHLORIDE 2 MG: 2 TABLET ORAL at 06:45

## 2017-09-20 RX ADMIN — HYDROMORPHONE HYDROCHLORIDE 2 MG: 2 TABLET ORAL at 20:59

## 2017-09-20 RX ADMIN — SENNOSIDES AND DOCUSATE SODIUM 1 TABLET: 8.6; 5 TABLET ORAL at 20:59

## 2017-09-20 RX ADMIN — HYDROMORPHONE HYDROCHLORIDE 2 MG: 2 TABLET ORAL at 17:23

## 2017-09-20 RX ADMIN — ACETAMINOPHEN 975 MG: 325 TABLET, FILM COATED ORAL at 06:45

## 2017-09-20 RX ADMIN — HYDROMORPHONE HYDROCHLORIDE 2 MG: 2 TABLET ORAL at 02:50

## 2017-09-20 RX ADMIN — PREDNISOLONE ACETATE 1 DROP: 10 SUSPENSION/ DROPS OPHTHALMIC at 21:00

## 2017-09-20 RX ADMIN — ENOXAPARIN SODIUM 40 MG: 40 INJECTION SUBCUTANEOUS at 14:15

## 2017-09-20 RX ADMIN — SIMVASTATIN 10 MG: 10 TABLET, FILM COATED ORAL at 21:00

## 2017-09-20 NOTE — PROGRESS NOTES
Walker Ortiz  2017  POD #3    Doing well.  No excessive bleeding  Pain well-controlled.  Tolerating physical therapy and rehabilitation well.  Temperatures:  Current - Temp: 98.2  F (36.8  C); Max - Temp  Av.2  F (36.8  C)  Min: 98.1  F (36.7  C)  Max: 98.3  F (36.8  C)  Pulse range: No Data Recorded  Blood pressure range: Systolic (24hrs), Av , Min:129 , Max:133   ; Diastolic (24hrs), Av, Min:78, Max:89    CMS: Laying in bed, appears comfortable. Good motion bilateral lower extremities, bilateral calves soft. Distal sensation intact, capillary refill brisk  Labs:  Hemoglobin   Date Value Ref Range Status   2017 13.2 (L) 13.3 - 17.7 g/dL Final   ]      PLAN:  Continue physical therapy  Pain control measures  Discharge plan: home tomorrow with outpatient therapy  Change dressing today

## 2017-09-20 NOTE — PLAN OF CARE
Problem: Goal Outcome Summary  Goal: Goal Outcome Summary  Discharge Planner PT   Patient plan for discharge: Home with OPPT  Current status: Pt performed bed mobility with min assist and sit to/from stand transfers with SBA.  Pt performed gait training 10 ft x 1 and 100 ft x 1 using wheeled walker and CGA.  Good stability without use of KI.  Knee AAROM is 12-85 degrees.  Barriers to return to prior living situation: 2 steps to enter but do not anticipate this will be a problem.  Recommendations for discharge: Home with assist and OP PT per plan established by the PT.   Rationale for recommendations: Pt is progressing well and will have assist of wife and OP PT to progress strength and ROM.       Entered by: Ashely Willingham 09/20/2017 12:15 PM

## 2017-09-20 NOTE — PLAN OF CARE
Problem: Goal Outcome Summary  Goal: Goal Outcome Summary  Outcome: Improving  POD#: 2. A&Ox4. UWA1. Pain controlled with PO dilaudid. C block turned to 4. Voiding adequately via urinal. VSS on RA. IV SL. Dressing CDI. CMS intact. DC TBD, possibly tomorrow. Continue to monitor.

## 2017-09-20 NOTE — DISCHARGE SUMMARY
Discharge Summary    Walker Ortiz MRN# [id number]   YOB: 1950 Age: 67 year old     Date of Admission:  9/18/2017  Date of Discharge:  9/21/2017  Admitting Physician:  Dominik Ramirez MD  Discharge Physician:  Dominik Ramirez MD     Primary Provider: Bhavana Mullen          Admission Diagnoses:   Osteoarthritis right knee          Discharge Diagnosis:   Same           Surgical Procedure:   right knee replacement           Secondary Diagnosis:              Discharge Disposition:   Discharged to home           Medications Prior to Admission:     Prescriptions Prior to Admission   Medication Sig Dispense Refill Last Dose     DiphenhydrAMINE HCl (BENADRYL PO) Take 25 mg by mouth every 4 hours as needed for allergies   9/17/2017 at 2100     Simvastatin (ZOCOR PO) Take 10 mg by mouth At Bedtime   9/17/2017 at 2100     prednisoLONE acetate (PRED FORTE) 1 % ophthalmic suspension Place 1 drop Into the left eye At Bedtime   5 9/17/2017 at hs     [DISCONTINUED] Naproxen Sodium (ALEVE PO) Take 220 mg by mouth 2 times daily as needed for moderate pain    Over 2 weeks ago at prn             Discharge Medications:     Current Discharge Medication List      START taking these medications    Details   HYDROmorphone (DILAUDID) 2 MG tablet Take 1-2 tablets (2-4 mg) by mouth every 3 hours as needed for moderate to severe pain  Qty: 60 tablet, Refills: 0    Associated Diagnoses: Right knee pain, unspecified chronicity      enoxaparin (LOVENOX) 40 MG/0.4ML injection Inject 0.4 mLs (40 mg) Subcutaneous every 24 hours for 10 days  Qty: 4 mL, Refills: 0    Associated Diagnoses: Right knee pain, unspecified chronicity      senna-docusate (SENOKOT-S;PERICOLACE) 8.6-50 MG per tablet Take 1-2 tablets by mouth 2 times daily as needed for constipation  Qty: 60 tablet, Refills: 0    Associated Diagnoses: Right knee pain, unspecified chronicity         CONTINUE these medications which have NOT CHANGED     Details   DiphenhydrAMINE HCl (BENADRYL PO) Take 25 mg by mouth every 4 hours as needed for allergies      Simvastatin (ZOCOR PO) Take 10 mg by mouth At Bedtime      prednisoLONE acetate (PRED FORTE) 1 % ophthalmic suspension Place 1 drop Into the left eye At Bedtime   Refills: 5         STOP taking these medications       Naproxen Sodium (ALEVE PO) Comments:   Reason for Stopping:                     Consultations:   No consultations were requested during this admission           Hospital Course:   The patient was admitted after the surgical procedure. The patient underwent an uneventfulright knee replacement. Postoperatively, anticoagulation  with Lovenox was started. No transfusion was required. The patient will be discharged to home. Home medications have been reconciled. Dilaudid 2 mg was prescribed for pain. Lovenox  will be prescribed.             Pending Results:   None           Discharge Instructions and Follow-Up:        Discharge activity: Activity as tolerated  continue your knee exercises   Discharge follow-up: Follow up with me in 2 weeks   Outpatient therapy: Physical therapy    Home Care agency: None    Supplies and equipment: Walker/cane        Wound care: dry dressing daily and as needed   Other instructions: None

## 2017-09-20 NOTE — PLAN OF CARE
Problem: Goal Outcome Summary  Goal: Goal Outcome Summary  Outcome: Improving  A/Ox4. VSS on RA. CMS intact. Hemovac patent. C-block running at 8. Regular diet. Up with 1 and walker. Pain controlled with scheduled tylenol. Will continue to monitor.

## 2017-09-20 NOTE — PLAN OF CARE
Problem: Goal Outcome Summary  Goal: Goal Outcome Summary  Outcome: Improving  VSS, CMS intact. Up with 1 and walker. Pain well controlled with oral dilaudid and tylenol. Dressing changed, incision looked good. Alert and orient x 4. Discharge home tomorrow.

## 2017-09-20 NOTE — PLAN OF CARE
Problem: Goal Outcome Summary  Goal: Goal Outcome Summary  OT: Orders rec'd. Educated patient on role of OT and he reports he has all AE at home, declines a need for OT at this time. Wife present and states she's able to help as needed on discharge.

## 2017-09-21 ENCOUNTER — APPOINTMENT (OUTPATIENT)
Dept: PHYSICAL THERAPY | Facility: CLINIC | Age: 67
DRG: 470 | End: 2017-09-21
Attending: ORTHOPAEDIC SURGERY
Payer: COMMERCIAL

## 2017-09-21 VITALS
SYSTOLIC BLOOD PRESSURE: 140 MMHG | WEIGHT: 209 LBS | TEMPERATURE: 98.2 F | OXYGEN SATURATION: 93 % | HEIGHT: 69 IN | BODY MASS INDEX: 30.96 KG/M2 | RESPIRATION RATE: 16 BRPM | DIASTOLIC BLOOD PRESSURE: 92 MMHG

## 2017-09-21 LAB
CREAT SERPL-MCNC: 1 MG/DL (ref 0.66–1.25)
GFR SERPL CREATININE-BSD FRML MDRD: 75 ML/MIN/1.7M2
PLATELET # BLD AUTO: 215 10E9/L (ref 150–450)

## 2017-09-21 PROCEDURE — 97116 GAIT TRAINING THERAPY: CPT | Mod: GP | Performed by: PHYSICAL THERAPY ASSISTANT

## 2017-09-21 PROCEDURE — A9270 NON-COVERED ITEM OR SERVICE: HCPCS | Mod: GY | Performed by: ORTHOPAEDIC SURGERY

## 2017-09-21 PROCEDURE — 36415 COLL VENOUS BLD VENIPUNCTURE: CPT | Performed by: ORTHOPAEDIC SURGERY

## 2017-09-21 PROCEDURE — 85049 AUTOMATED PLATELET COUNT: CPT | Performed by: ORTHOPAEDIC SURGERY

## 2017-09-21 PROCEDURE — 97110 THERAPEUTIC EXERCISES: CPT | Mod: GP | Performed by: PHYSICAL THERAPY ASSISTANT

## 2017-09-21 PROCEDURE — 40000193 ZZH STATISTIC PT WARD VISIT: Performed by: PHYSICAL THERAPY ASSISTANT

## 2017-09-21 PROCEDURE — 25000128 H RX IP 250 OP 636: Performed by: ORTHOPAEDIC SURGERY

## 2017-09-21 PROCEDURE — 82565 ASSAY OF CREATININE: CPT | Performed by: ORTHOPAEDIC SURGERY

## 2017-09-21 PROCEDURE — 25000132 ZZH RX MED GY IP 250 OP 250 PS 637: Mod: GY | Performed by: ORTHOPAEDIC SURGERY

## 2017-09-21 RX ADMIN — ACETAMINOPHEN 975 MG: 325 TABLET, FILM COATED ORAL at 13:04

## 2017-09-21 RX ADMIN — ACETAMINOPHEN 975 MG: 325 TABLET, FILM COATED ORAL at 06:11

## 2017-09-21 RX ADMIN — ENOXAPARIN SODIUM 40 MG: 40 INJECTION SUBCUTANEOUS at 11:53

## 2017-09-21 RX ADMIN — SENNOSIDES AND DOCUSATE SODIUM 2 TABLET: 8.6; 5 TABLET ORAL at 08:26

## 2017-09-21 RX ADMIN — HYDROMORPHONE HYDROCHLORIDE 2 MG: 2 TABLET ORAL at 08:26

## 2017-09-21 RX ADMIN — HYDROMORPHONE HYDROCHLORIDE 2 MG: 2 TABLET ORAL at 00:21

## 2017-09-21 RX ADMIN — HYDROMORPHONE HYDROCHLORIDE 2 MG: 2 TABLET ORAL at 13:03

## 2017-09-21 NOTE — PLAN OF CARE
Problem: Goal Outcome Summary  Goal: Goal Outcome Summary  Discharge Planner PT   Patient plan for discharge: Home with OPPT  Current status: Pt performed bed mobility and sit to/from stand transfers with SBA.  Pt performed gait training 10 ft x 1 and 100 ft x 1 using wheeled walker and SBA.  Good stability without use of KI. Slow pace.  Declined further gait and stairs due to increased pain.  Knee AAROM is 12-90 degrees.  Barriers to return to prior living situation: None  Recommendations for discharge: Home with assist and OP PT per plan established by the PT.   Rationale for recommendations: Pt is progressing well and will have assist of wife and OP PT to progress strength and ROM.       Entered by: Ashely Willingham 09/21/2017 10:05 AM       Pt is discharging home today with assist and OP PT.  PT goals partially met.     Physical Therapy Discharge Summary    Reason for therapy discharge:    Discharged to home with outpatient therapy.    Progress towards therapy goal(s). See goals on Care Plan in [facility] electronic health record for goal details.  Goals partially met.  Barriers to achieving goals:   discharge from facility.    Therapy recommendation(s):    Continued therapy is recommended.  Rationale/Recommendations:  Patient would benfit from continued skilled PT to increase knee strength and ROM, improve quality of gait and independence. .

## 2017-09-21 NOTE — PLAN OF CARE
Problem: Goal Outcome Summary  Goal: Goal Outcome Summary  Outcome: Improving  A/o4. VSS. Voiding. Dressing CDI. IS 3250. Reg diet. Pain mgt w/PO Dilaudid. Fluid intake better. Output is dark yellow - adequate 450ml overnight. Plan to d/c to home with wife after 12pm.

## 2017-09-21 NOTE — DISCHARGE INSTRUCTIONS
"TOTAL KNEE REPLACEMENT TAKE HOME INSTRUCTIONS  Your surgeon will answer any questions about your progress. General guidelines for your care are listed below. Your surgeon may give additional instructions for your care at home. Please follow them carefully    Activity Level  1. Physical activity may be resumed gradually according to your comfort level and your surgeon s instructions. Follow your exercise program as instructed by your therapist. Do exercises at least twice a day. you may ice your knee after exercising.  2. Complete exercises two hours before bedtime to minimize the effect pain may have on sleep.  Refer to pages 19-22 of you \"Total Knee Replacement\" booklet for details  3. Do not wear your knee immobilizer unless your doctor has specifically told you to continue it.    Good Health Practices  1. Maintain an adequate fluid intake and eat a well balanced diet.  2. Be sure to include the basic food groups, such as dairy products, meat/fish, vegetables, and fruit. Each of these foods contribute to your wound healing and increasing your strength.  3. Surgery, decreased activity and pain medication all contribute to a decrease in bowel activity that can result in constipation. It is recommended that you increase your liquid intake, add fiber to your diet, increase activity, and decrease pain medication use. If you have any problems, notify your physician.  4. Wear your anti-embolism stockings day and night until seen by your surgeon if you were issued these at discharge.  (Not all patients will have anti-embolism stockings) Remove twice a day for one hour at a time. You may hand wash and air dry your stockings.  5. Notify your dentist of your total knee surgery and call your dentist one week before a dental appointment for antibiotics, if your dentist will not prescribe antibiotics then call your surgeon to ask for next steps.      Incision/Dressing Care  1. Keep incision clean and dry per surgeons " instructions  2. Cover incision if you are still having drainage.  3.  If you have a waterproof dressing __________________________   Shower.    Things to Watch For  1. Check incision daily for increased redness, tenderness, swelling, or drainage along the incision line. If these occur, please notify your doctor. Also, call if you develop a fever above 101 .  2. Please notify your doctor if you experience any calf pain and/or if you have surgical pain not relieved by the pain medication prescribed by your doctor.

## 2017-09-21 NOTE — PROGRESS NOTES
Walker Ortiz  2017  POD #3    Doing well.  No excessive bleeding  Pain well-controlled.  Tolerating physical therapy and rehabilitation well.  Temperatures:  Current - Temp: 98.4  F (36.9  C); Max - Temp  Av.5  F (36.9  C)  Min: 98.2  F (36.8  C)  Max: 99.1  F (37.3  C)  Pulse range: No Data Recorded  Blood pressure range: Systolic (24hrs), Av , Min:120 , Max:142   ; Diastolic (24hrs), Av, Min:68, Max:93    CMS: Appears comfortable sitting up in bed with ice to right knee. Dressing clean and dry. Good motion bilateral lower extremities. Distal sensation intact, capillary refill brisk. Bilateral calves soft  Labs:  Hemoglobin   Date Value Ref Range Status   2017 13.2 (L) 13.3 - 17.7 g/dL Final   ]      PLAN:  Continue physical therapy  Pain control measures  Discharge plan: home today with wife. Outpatient physical therapy

## 2017-09-21 NOTE — PROGRESS NOTES
Vss, cms intact. Pain well controlled with oral dilaudid. Reviewed AVS with patient and wife. No concerns at this time. Discharge meds given. Alert and orient x 4. Left floor at 1320 via wheelchair.

## 2017-09-25 ENCOUNTER — THERAPY VISIT (OUTPATIENT)
Dept: PHYSICAL THERAPY | Facility: CLINIC | Age: 67
End: 2017-09-25
Payer: COMMERCIAL

## 2017-09-25 DIAGNOSIS — M25.561 RIGHT KNEE PAIN, UNSPECIFIED CHRONICITY: Primary | ICD-10-CM

## 2017-09-25 DIAGNOSIS — Z96.651 AFTERCARE FOLLOWING RIGHT KNEE JOINT REPLACEMENT SURGERY: ICD-10-CM

## 2017-09-25 DIAGNOSIS — Z47.1 AFTERCARE FOLLOWING RIGHT KNEE JOINT REPLACEMENT SURGERY: ICD-10-CM

## 2017-09-25 PROCEDURE — 97110 THERAPEUTIC EXERCISES: CPT | Mod: GP | Performed by: PHYSICAL THERAPIST

## 2017-09-25 PROCEDURE — 97161 PT EVAL LOW COMPLEX 20 MIN: CPT | Mod: GP | Performed by: PHYSICAL THERAPIST

## 2017-09-25 PROCEDURE — 97112 NEUROMUSCULAR REEDUCATION: CPT | Mod: GP | Performed by: PHYSICAL THERAPIST

## 2017-09-25 ASSESSMENT — ACTIVITIES OF DAILY LIVING (ADL)
KNEE_ACTIVITY_OF_DAILY_LIVING_SUM: 31
SWELLING: I HAVE THE SYMPTOM BUT IT DOES NOT AFFECT MY ACTIVITY
AS_A_RESULT_OF_YOUR_KNEE_INJURY,_HOW_WOULD_YOU_RATE_YOUR_CURRENT_LEVEL_OF_DAILY_ACTIVITY?: ABNORMAL
RAW_SCORE: 33.38
GO UP STAIRS: ACTIVITY IS FAIRLY DIFFICULT
PAIN: THE SYMPTOM AFFECTS MY ACTIVITY SLIGHTLY
WALK: ACTIVITY IS FAIRLY DIFFICULT
RISE FROM A CHAIR: ACTIVITY IS SOMEWHAT DIFFICULT
HOW_WOULD_YOU_RATE_THE_CURRENT_FUNCTION_OF_YOUR_KNEE_DURING_YOUR_USUAL_DAILY_ACTIVITIES_ON_A_SCALE_FROM_0_TO_100_WITH_100_BEING_YOUR_LEVEL_OF_KNEE_FUNCTION_PRIOR_TO_YOUR_INJURY_AND_0_BEING_THE_INABILITY_TO_PERFORM_ANY_OF_YOUR_USUAL_DAILY_ACTIVITIES?: 50
LIMPING: THE SYMPTOM AFFECTS MY ACTIVITY MODERATELY
KNEEL ON THE FRONT OF YOUR KNEE: NOT ANSWERED
STAND: ACTIVITY IS SOMEWHAT DIFFICULT
STIFFNESS: THE SYMPTOM AFFECTS MY ACTIVITY SLIGHTLY
HOW_WOULD_YOU_RATE_THE_OVERALL_FUNCTION_OF_YOUR_KNEE_DURING_YOUR_USUAL_DAILY_ACTIVITIES?: ABNORMAL
WEAKNESS: THE SYMPTOM AFFECTS MY ACTIVITY MODERATELY
GO DOWN STAIRS: ACTIVITY IS FAIRLY DIFFICULT
KNEE_ACTIVITY_OF_DAILY_LIVING_SCORE: 47.69
SQUAT: I AM UNABLE TO DO THE ACTIVITY
SIT WITH YOUR KNEE BENT: ACTIVITY IS FAIRLY DIFFICULT
GIVING WAY, BUCKLING OR SHIFTING OF KNEE: THE SYMPTOM AFFECTS MY ACTIVITY SLIGHTLY

## 2017-09-25 NOTE — MR AVS SNAPSHOT
"              After Visit Summary   9/25/2017    Walker Ortiz    MRN: [id number]           Patient Information     Date Of Birth          1950        Visit Information        Provider Department      9/25/2017 12:00 PM Leatha Enamorado PT [facility]        Today's Diagnoses     Right knee pain, unspecified chronicity    -  1    Aftercare following right knee joint replacement surgery           Follow-ups after your visit        Your next 10 appointments already scheduled     Sep 27, 2017 11:40 AM CDT   ALBA Extremity with Leatha Enamorado PT   [facility] (ALBA [facility]  )    [address]  [facility] [address] [phone]              Who to contact     If you have questions or need follow up information about today's clinic visit or your schedule please contact [facility] directly at [phone].  Normal or non-critical lab and imaging results will be communicated to you by OPPRTUNITYhart, letter or phone within 4 business days after the clinic has received the results. If you do not hear from us within 7 days, please contact the clinic through Graftyst or phone. If you have a critical or abnormal lab result, we will notify you by phone as soon as possible.  Submit refill requests through Buzzilla or call your pharmacy and they will forward the refill request to us. Please allow 3 business days for your refill to be completed.          Additional Information About Your Visit        OPPRTUNITYhart Information     Buzzilla lets you send messages to your doctor, view your test results, renew your prescriptions, schedule appointments and more. To sign up, go to [email or web address] . Click on \"Log in\" on the left side of the screen, which will take you to the Welcome page. Then click on \"Sign up Now\" on the right side of the page.     You will be asked to enter the access " code listed below, as well as some personal information. Please follow the directions to create your username and password.     Your access code is: [email or web address]  Expires: 2017  4:01 PM     Your access code will  in 90 days. If you need help or a new code, please call your [facility] clinic or [phone].        Care EveryWhere ID     This is your Care EveryWhere ID. This could be used by other organizations to access your [facility] medical records  [id number]         Blood Pressure from Last 3 Encounters:   17 (!) 140/92   17 118/72   16 115/80    Weight from Last 3 Encounters:   17 94.8 kg (209 lb)   17 95.3 kg (210 lb)   16 95.7 kg (211 lb)              We Performed the Following     HC PT EVAL, LOW COMPLEXITY     ALBA INITIAL EVAL REPORT     NEUROMUSCULAR RE-EDUCATION     THERAPEUTIC EXERCISES        Primary Care Provider Office Phone # Fax #    Bhavana Mullen [phone] [address]  [facility] [address]        Equal Access to Services     [facility] AH: Hadii aad ku hadasho Soomaali, waaxda luqadaha, qaybta kaalmada adeegyada, waxay mirlandein haynhungn etienne brandt ah. So [facility] [phone].    ATENCIÓN: Si habla español, tiene a cheney disposición servicios gratuitos de asistencia lingüística. Llame al [phone].    We comply with applicable federal civil rights laws and [place] laws. We do not discriminate on the basis of race, color, national origin, age, disability sex, sexual orientation or gender identity.            Thank you!     Thank you for choosing [facility]  for your care. Our goal is always to provide you with excellent care. Hearing back from our patients is one way we can continue to improve our services. Please take a few minutes to complete the written survey that you may receive in the mail after your visit with us. Thank you!             Your Updated Medication List -  Protect others around you: Learn how to safely use, store and throw away your medicines at www.disposemymeds.org.          This list is accurate as of: 9/25/17 12:56 PM.  Always use your most recent med list.                   Brand Name Dispense Instructions for use Diagnosis    BENADRYL PO      Take 25 mg by mouth every 4 hours as needed for allergies        enoxaparin 40 MG/0.4ML injection    LOVENOX    4 mL    Inject 0.4 mLs (40 mg) Subcutaneous every 24 hours for 10 days    Right knee pain, unspecified chronicity       HYDROmorphone 2 MG tablet    DILAUDID    60 tablet    Take 1-2 tablets (2-4 mg) by mouth every 3 hours as needed for moderate to severe pain    Right knee pain, unspecified chronicity       prednisoLONE acetate 1 % ophthalmic susp    PRED FORTE     Place 1 drop Into the left eye At Bedtime        senna-docusate 8.6-50 MG per tablet    SENOKOT-S;PERICOLACE    60 tablet    Take 1-2 tablets by mouth 2 times daily as needed for constipation    Right knee pain, unspecified chronicity       ZOCOR PO      Take 10 mg by mouth At Bedtime

## 2017-09-25 NOTE — PROGRESS NOTES
"Subjective:  Physical Therapy Initial Evaluation   17   Precautions/Restrictions/MD instructions: PT eval and treat   Therapist Impression:   Pt is a 66 y/o male, with 1 week history of right knee pain, 1 wks s/p TKA. Pt presents with pain, decreased ROM/ mobility, poor balance and decreased strength. These impairments limit their ability to walk and stand prolonged period of time and go up/down stairs. Skilled PT services necessary in order to reduce impairments and improve independent function.    Subjective:   Chief Complaint: right knee pain, s/p TKA   DOI/onset: 17 DOS: 17  Location: right knee  Quality: sharp at times, dull other times  Frequency: intermittent  Radiates: none  Pain scale: Rest 2/10 Activity 5/10    Sleepin-2X/night wakes due to pain    Exacerbated by: standing, walking and going up and down stairs Relieved by: ice, elevate, massage, pain meds Progression: getting better  Previous Treatment: PT prior to surgery  Effect of prior treatment: good    PMH and/or surgical history: L TKA (5 years ago); 18\" of colon take out, cornea transplant    Imaging: none after surgery yet    Occupation: [employer] Job duties: sitting, driving, walking    Current HEP/exercise regimen: walking  Patient's goals: walk and daily function without pain  Medications: eye drops, steroids, cholesterol   General health as reported by patient: good   Return to MD: follow up on 10/3/17  Red Flags: none to note    HPI                    Objective:  KNEE:    Standing Posture: lacking terminal knee extension on right    SL Balance:   Test in future visit    Gait: walking with walker (step to gait pattern); lacking terminal knee extension and push off      Swelling:    L R   Joint line 36.5cm 40cm           AROM: (* indicates patient's pain)   PROM:(over pressrue)   L  R L R   Hyperextension   0  0    Extension   0 6 0 6   Flexion   130 90 130 90     Strength:  QS's: fair on right  SLR: unable today " (independently)    Palpation: No oozing; staples in place      System    Physical Exam    General     ROS    Assessment/Plan:      Patient is a 67 year old male with right side knee complaints.    Patient has the following significant findings with corresponding treatment plan.                Diagnosis 1:  Right knee pain/ s/p TKA  Pain -  hot/cold therapy, manual therapy, splint/taping/bracing/orthotics, self management, education and home program  Decreased ROM/flexibility - manual therapy and therapeutic exercise  Decreased joint mobility - manual therapy and therapeutic exercise  Decreased strength - therapeutic exercise and therapeutic activities  Impaired balance - neuro re-education and therapeutic activities  Decreased proprioception - neuro re-education and therapeutic activities  Inflammation - cold therapy and self management/home program  Edema - cold therapy and self management/home program  Impaired gait - gait training  Impaired muscle performance - neuro re-education  Decreased function - therapeutic activities  Impaired posture - neuro re-education  Instability -  Therapeutic Activity  Therapeutic Exercise    Therapy Evaluation Codes:   1) History comprised of:   Personal factors that impact the plan of care:      None.    Comorbidity factors that impact the plan of care are:      None.     Medications impacting care: None.  2) Examination of Body Systems comprised of:   Body structures and functions that impact the plan of care:      Knee.   Activity limitations that impact the plan of care are:      Stairs, Standing, Walking and Working.  3) Clinical presentation characteristics are:   Stable/Uncomplicated.  4) Decision-Making    Low complexity using standardized patient assessment instrument and/or measureable assessment of functional outcome.  Cumulative Therapy Evaluation is: Low complexity.    Previous and current functional limitations:  (See Goal Flow Sheet for this information)    Short  term and Long term goals: (See Goal Flow Sheet for this information)     Communication ability:  Patient appears to be able to clearly communicate and understand verbal and written communication and follow directions correctly.  Treatment Explanation - The following has been discussed with the patient:   RX ordered/plan of care  Anticipated outcomes  Possible risks and side effects  This patient would benefit from PT intervention to resume normal activities.   Rehab potential is good.    Frequency:  2 X week, once daily  Duration:  for 4 weeks, tappering down to 1X/week for 6 weeks  Discharge Plan:  Achieve all LTG.  Independent in home treatment program.  Reach maximal therapeutic benefit.    Please refer to the daily flowsheet for treatment today, total treatment time and time spent performing 1:1 timed codes.

## 2017-09-25 NOTE — LETTER
"[facility]  [address]  [facility] [address]  [phone]  2017  Re: Walker Ortiz   :   1950  MRN:  [id number]   REFERRING PHYSICIAN:   Dominik Ramirez    [facility]  Date of Initial Evaluation:  2017  Visits: 1 Rxs Used: 1  Reason for Referral:     Right knee pain, unspecified chronicity  Aftercare following right knee joint replacement surgery  Physical Therapy Initial Evaluation   17   Precautions/Restrictions/MD instructions: PT eval and treat   Therapist Impression:   Pt is a 66 y/o male, with 1 week history of right knee pain, 1 wks s/p TKA. Pt presents with pain, decreased ROM/ mobility, poor balance and decreased strength. These impairments limit their ability to walk and stand prolonged period of time and go up/down stairs. Skilled PT services necessary in order to reduce impairments and improve independent function.  Subjective:   Chief Complaint: right knee pain, s/p TKA   DOI/onset: 17 DOS: 17  Location: right knee  Quality: sharp at times, dull other times  Frequency: intermittent  Radiates: none  Pain scale: Rest 2/10 Activity 5/10    Sleepin-2X/night wakes due to pain    Exacerbated by: standing, walking and going up and down stairs Relieved by: ice, elevate, massage, pain meds Progression: getting better  Previous Treatment: PT prior to surgery  Effect of prior treatment: good    PMH and/or surgical history: L TKA (5 years ago); 18\" of colon take out, cornea transplant    Imaging: none after surgery yet    Occupation: [employer] Job duties: sitting, driving, walking    Current HEP/exercise regimen: walking  Patient's goals: walk and daily function without pain  Medications: eye drops, steroids, cholesterol   Re: Walker Ortiz   :   1950    General health as reported by patient: good   Return to MD: follow up on 10/3/17  Red " Flags: none to note           Objective:  KNEE:  Standing Posture: lacking terminal knee extension on right  SL Balance:   Test in future visit  Gait: walking with walker (step to gait pattern); lacking terminal knee extension and push off  Swelling:    L R   Joint line 36.5cm 40cm         AROM: (* indicates patient's pain)   PROM:(over pressrue)   L  R L R   Hyperextension   0  0    Extension   0 6 0 6   Flexion   130 90 130 90   Strength:  QS's: fair on right  SLR: unable today (independently)  Palpation: No oozing; staples in place    Assessment/Plan:    Patient is a 67 year old male with right side knee complaints.    Patient has the following significant findings with corresponding treatment plan.                Diagnosis 1:  Right knee pain/ s/p TKA  Pain -  hot/cold therapy, manual therapy, splint/taping/bracing/orthotics, self management, education and home program  Decreased ROM/flexibility - manual therapy and therapeutic exercise  Decreased joint mobility - manual therapy and therapeutic exercise  Decreased strength - therapeutic exercise and therapeutic activities  Impaired balance - neuro re-education and therapeutic activities  Decreased proprioception - neuro re-education and therapeutic activities  Inflammation - cold therapy and self management/home program  Edema - cold therapy and self management/home program  Impaired gait - gait training  Impaired muscle performance - neuro re-education  Decreased function - therapeutic activities  Impaired posture - neuro re-education  Instability -  Therapeutic Activity  Therapeutic Exercise  Therapy Evaluation Codes:   1) History comprised of:  Re: Walker Ortiz   :   1950     Personal factors that impact the plan of care:      None.    Comorbidity factors that impact the plan of care are:      None.     Medications impacting care: None.  2) Examination of Body Systems comprised of:   Body structures and functions that impact the plan of care:       Knee.   Activity limitations that impact the plan of care are:      Stairs, Standing, Walking and Working.  3) Clinical presentation characteristics are:   Stable/Uncomplicated.  4) Decision-Making    Low complexity using standardized patient assessment instrument and/or measureable assessment of functional outcome.  Cumulative Therapy Evaluation is: Low complexity.  Previous and current functional limitations:  (See Goal Flow Sheet for this information)    Short term and Long term goals: (See Goal Flow Sheet for this information)   Communication ability:  Patient appears to be able to clearly communicate and understand verbal and written communication and follow directions correctly.  Treatment Explanation - The following has been discussed with the patient:   RX ordered/plan of care  Anticipated outcomes  Possible risks and side effects  This patient would benefit from PT intervention to resume normal activities.   Rehab potential is good.  Frequency:  2 X week, once daily  Duration:  for 4 weeks, tappering down to 1X/week for 6 weeks  Discharge Plan:  Achieve all LTG.  Independent in home treatment program.  Reach maximal therapeutic benefit.        Thank you for your referral.    INQUIRIES  Therapist: Leatha Enamorado DPT  [facility]  [address] [place], [place] [address] [address]  Phone: [phone]  Fax: [phone]

## 2017-09-27 ENCOUNTER — THERAPY VISIT (OUTPATIENT)
Dept: PHYSICAL THERAPY | Facility: CLINIC | Age: 67
End: 2017-09-27
Payer: COMMERCIAL

## 2017-09-27 DIAGNOSIS — M25.561 RIGHT KNEE PAIN, UNSPECIFIED CHRONICITY: ICD-10-CM

## 2017-09-27 DIAGNOSIS — Z47.1 AFTERCARE FOLLOWING RIGHT KNEE JOINT REPLACEMENT SURGERY: ICD-10-CM

## 2017-09-27 DIAGNOSIS — Z96.651 AFTERCARE FOLLOWING RIGHT KNEE JOINT REPLACEMENT SURGERY: ICD-10-CM

## 2017-09-27 PROCEDURE — 97112 NEUROMUSCULAR REEDUCATION: CPT | Mod: GP | Performed by: PHYSICAL THERAPIST

## 2017-09-27 PROCEDURE — 97110 THERAPEUTIC EXERCISES: CPT | Mod: GP | Performed by: PHYSICAL THERAPIST

## 2017-09-27 PROCEDURE — 97140 MANUAL THERAPY 1/> REGIONS: CPT | Mod: GP | Performed by: PHYSICAL THERAPIST

## 2017-09-29 NOTE — PROGRESS NOTES
Order(s) created erroneously. Erroneous order ID: [id number]   Order canceled by: CELESTINE CANO   Order cancel date/time: 09/29/2017 11:21 AM

## 2017-09-29 NOTE — PROGRESS NOTES
Order(s) created erroneously. Erroneous order ID: [id number]   Order canceled by: CELESTINE CANO   Order cancel date/time: 09/29/2017 11:21 AM

## 2017-10-02 ENCOUNTER — THERAPY VISIT (OUTPATIENT)
Dept: PHYSICAL THERAPY | Facility: CLINIC | Age: 67
End: 2017-10-02
Payer: COMMERCIAL

## 2017-10-02 DIAGNOSIS — Z96.651 AFTERCARE FOLLOWING RIGHT KNEE JOINT REPLACEMENT SURGERY: ICD-10-CM

## 2017-10-02 DIAGNOSIS — M25.561 RIGHT KNEE PAIN, UNSPECIFIED CHRONICITY: ICD-10-CM

## 2017-10-02 DIAGNOSIS — Z47.1 AFTERCARE FOLLOWING RIGHT KNEE JOINT REPLACEMENT SURGERY: ICD-10-CM

## 2017-10-02 PROCEDURE — 97140 MANUAL THERAPY 1/> REGIONS: CPT | Mod: GP | Performed by: PHYSICAL THERAPIST

## 2017-10-02 PROCEDURE — 97110 THERAPEUTIC EXERCISES: CPT | Mod: GP | Performed by: PHYSICAL THERAPIST

## 2017-10-02 PROCEDURE — 97112 NEUROMUSCULAR REEDUCATION: CPT | Mod: GP | Performed by: PHYSICAL THERAPIST

## 2017-10-02 NOTE — LETTER
[facility]  [address]  [facility] [address]  [phone]  2017  Re: Walker Ortiz   :   1950  MRN:  [id number]   REFERRING PHYSICIAN:   Dominik Ramirez    [facility]  Date of Initial Evaluation:  17  Visits: 3 Rxs Used: 3  Reason for Referral:     Right knee pain, unspecified chronicity  Aftercare following right knee joint replacement surgery    PROGRESS  REPORT  Progress reporting period is from 17 to 10/2/17 (3 visits).       SUBJECTIVE  Subjective changes noted by patient:  Patient reports that he is doing well overall.  He notes that pain is improving and he's managing with Tylenol now.  He's able to walk 1.5 blocks down/back with his walker.      Current Pain level: 2/10.     Initial Pain level: 5/10.   Changes in function:  Yes (See Goal flowsheet attached for changes in current functional level)  Adverse reaction to treatment or activity: None    OBJECTIVE  Changes noted in objective findings:  Yes,   Objective:   PROM R knee -3-104 degrees.  (L 0-130 previous TKA)    Edema min-mod, some discoloration in lower leg yet.    Ambulating with wheeled walker (lacks some terminal extension with swing, but improving).     ASSESSMENT/PLAN  Updated problem list and treatment plan: Diagnosis 1:  S/p R TKA on 17    Pain -  hot/cold therapy  Decreased ROM/flexibility - manual therapy and therapeutic exercise  Decreased strength - therapeutic exercise and therapeutic activities  Impaired balance - neuro re-education and therapeutic activities  Decreased proprioception - neuro re-education and therapeutic activities  Edema - cold therapy  Impaired gait - gait training  Decreased function - therapeutic activities  STG/LTGs have been met or progress has been made towards goals:  Yes (See Goal flow sheet completed today.)  Assessment of Progress: The patient's condition  is improving.  Self Management Plans:  Patient has been instructed in a home treatment program.  I have re-evaluated this patient and find that the nature, scope, duration and intensity of the therapy is appropriate for the medical condition of the patient.  Re: Walker Ortiz   :   1950    Walker continues to require the following intervention to meet STG and LTG's:  PT  Recommendations:  This patient would benefit from continued therapy.     Frequency:  2 X week, once daily  Duration:  for 6 weeks        Thank you for your referral.    INQUIRIES  Therapist: MARÍA SunT, Cert. MDT  [facility]  [address] [place] [address] [address] [address]  Phone: [phone]  Fax: [phone]

## 2017-10-02 NOTE — PROGRESS NOTES
Subjective:    HPI                    Objective:    System    Physical Exam    General     ROS    Assessment/Plan:      PROGRESS  REPORT    Progress reporting period is from 9/25/17 to 10/2/17 (3 visits).       SUBJECTIVE  Subjective changes noted by patient:  Patient reports that he is doing well overall.  He notes that pain is improving and he's managing with Tylenol now.  He's able to walk 1.5 blocks down/back with his walker.      Current Pain level: 2/10.     Initial Pain level: 5/10.   Changes in function:  Yes (See Goal flowsheet attached for changes in current functional level)  Adverse reaction to treatment or activity: None    OBJECTIVE  Changes noted in objective findings:  Yes,   Objective:     PROM R knee -3-104 degrees.  (L 0-130 previous TKA)      Edema min-mod, some discoloration in lower leg yet.      Ambulating with wheeled walker (lacks some terminal extension with swing, but improving).     ASSESSMENT/PLAN  Updated problem list and treatment plan: Diagnosis 1:  S/p R TKA on 9/18/17    Pain -  hot/cold therapy  Decreased ROM/flexibility - manual therapy and therapeutic exercise  Decreased strength - therapeutic exercise and therapeutic activities  Impaired balance - neuro re-education and therapeutic activities  Decreased proprioception - neuro re-education and therapeutic activities  Edema - cold therapy  Impaired gait - gait training  Decreased function - therapeutic activities  STG/LTGs have been met or progress has been made towards goals:  Yes (See Goal flow sheet completed today.)  Assessment of Progress: The patient's condition is improving.  Self Management Plans:  Patient has been instructed in a home treatment program.  I have re-evaluated this patient and find that the nature, scope, duration and intensity of the therapy is appropriate for the medical condition of the patient.  Walker continues to require the following intervention to meet STG and LTG's:  PT    Recommendations:  This  patient would benefit from continued therapy.     Frequency:  2 X week, once daily  Duration:  for 6 weeks          Please refer to the daily flowsheet for treatment today, total treatment time and time spent performing 1:1 timed codes.

## 2017-10-03 ENCOUNTER — TRANSFERRED RECORDS (OUTPATIENT)
Dept: FAMILY MEDICINE | Facility: CLINIC | Age: 67
End: 2017-10-03

## 2017-10-03 ENCOUNTER — RADIANT APPOINTMENT (OUTPATIENT)
Dept: GENERAL RADIOLOGY | Facility: CLINIC | Age: 67
End: 2017-10-03
Attending: ORTHOPAEDIC SURGERY
Payer: COMMERCIAL

## 2017-10-03 DIAGNOSIS — R52 PAIN: ICD-10-CM

## 2017-10-03 PROCEDURE — 73560 X-RAY EXAM OF KNEE 1 OR 2: CPT | Mod: TC

## 2017-10-04 ENCOUNTER — THERAPY VISIT (OUTPATIENT)
Dept: PHYSICAL THERAPY | Facility: CLINIC | Age: 67
End: 2017-10-04
Payer: COMMERCIAL

## 2017-10-04 DIAGNOSIS — Z96.651 AFTERCARE FOLLOWING RIGHT KNEE JOINT REPLACEMENT SURGERY: ICD-10-CM

## 2017-10-04 DIAGNOSIS — Z47.1 AFTERCARE FOLLOWING RIGHT KNEE JOINT REPLACEMENT SURGERY: ICD-10-CM

## 2017-10-04 DIAGNOSIS — M25.561 RIGHT KNEE PAIN, UNSPECIFIED CHRONICITY: ICD-10-CM

## 2017-10-04 PROCEDURE — 97112 NEUROMUSCULAR REEDUCATION: CPT | Mod: GP | Performed by: PHYSICAL THERAPIST

## 2017-10-04 PROCEDURE — 97140 MANUAL THERAPY 1/> REGIONS: CPT | Mod: GP | Performed by: PHYSICAL THERAPIST

## 2017-10-04 PROCEDURE — 97110 THERAPEUTIC EXERCISES: CPT | Mod: GP | Performed by: PHYSICAL THERAPIST

## 2017-10-10 ENCOUNTER — THERAPY VISIT (OUTPATIENT)
Dept: PHYSICAL THERAPY | Facility: CLINIC | Age: 67
End: 2017-10-10
Payer: COMMERCIAL

## 2017-10-10 DIAGNOSIS — M25.561 RIGHT KNEE PAIN, UNSPECIFIED CHRONICITY: ICD-10-CM

## 2017-10-10 DIAGNOSIS — Z96.651 AFTERCARE FOLLOWING RIGHT KNEE JOINT REPLACEMENT SURGERY: ICD-10-CM

## 2017-10-10 DIAGNOSIS — Z47.1 AFTERCARE FOLLOWING RIGHT KNEE JOINT REPLACEMENT SURGERY: ICD-10-CM

## 2017-10-10 PROCEDURE — 97110 THERAPEUTIC EXERCISES: CPT | Mod: GP | Performed by: PHYSICAL THERAPIST

## 2017-10-10 PROCEDURE — 97112 NEUROMUSCULAR REEDUCATION: CPT | Mod: GP | Performed by: PHYSICAL THERAPIST

## 2017-10-10 PROCEDURE — 97140 MANUAL THERAPY 1/> REGIONS: CPT | Mod: GP | Performed by: PHYSICAL THERAPIST

## 2017-10-13 ENCOUNTER — THERAPY VISIT (OUTPATIENT)
Dept: PHYSICAL THERAPY | Facility: CLINIC | Age: 67
End: 2017-10-13
Payer: COMMERCIAL

## 2017-10-13 DIAGNOSIS — Z47.1 AFTERCARE FOLLOWING RIGHT KNEE JOINT REPLACEMENT SURGERY: ICD-10-CM

## 2017-10-13 DIAGNOSIS — Z96.651 AFTERCARE FOLLOWING RIGHT KNEE JOINT REPLACEMENT SURGERY: ICD-10-CM

## 2017-10-13 DIAGNOSIS — M25.561 RIGHT KNEE PAIN, UNSPECIFIED CHRONICITY: ICD-10-CM

## 2017-10-13 PROCEDURE — 97140 MANUAL THERAPY 1/> REGIONS: CPT | Mod: GP | Performed by: PHYSICAL THERAPIST

## 2017-10-13 PROCEDURE — 97112 NEUROMUSCULAR REEDUCATION: CPT | Mod: GP | Performed by: PHYSICAL THERAPIST

## 2017-10-13 PROCEDURE — 97110 THERAPEUTIC EXERCISES: CPT | Mod: GP | Performed by: PHYSICAL THERAPIST

## 2017-10-18 ENCOUNTER — THERAPY VISIT (OUTPATIENT)
Dept: PHYSICAL THERAPY | Facility: CLINIC | Age: 67
End: 2017-10-18
Payer: COMMERCIAL

## 2017-10-18 DIAGNOSIS — Z96.651 AFTERCARE FOLLOWING RIGHT KNEE JOINT REPLACEMENT SURGERY: ICD-10-CM

## 2017-10-18 DIAGNOSIS — Z47.1 AFTERCARE FOLLOWING RIGHT KNEE JOINT REPLACEMENT SURGERY: ICD-10-CM

## 2017-10-18 DIAGNOSIS — M25.561 RIGHT KNEE PAIN, UNSPECIFIED CHRONICITY: ICD-10-CM

## 2017-10-18 PROCEDURE — 97140 MANUAL THERAPY 1/> REGIONS: CPT | Mod: GP | Performed by: PHYSICAL THERAPIST

## 2017-10-18 PROCEDURE — 97112 NEUROMUSCULAR REEDUCATION: CPT | Mod: GP | Performed by: PHYSICAL THERAPIST

## 2017-10-18 PROCEDURE — 97110 THERAPEUTIC EXERCISES: CPT | Mod: GP | Performed by: PHYSICAL THERAPIST

## 2017-10-20 ENCOUNTER — THERAPY VISIT (OUTPATIENT)
Dept: PHYSICAL THERAPY | Facility: CLINIC | Age: 67
End: 2017-10-20
Payer: COMMERCIAL

## 2017-10-20 DIAGNOSIS — Z47.1 AFTERCARE FOLLOWING RIGHT KNEE JOINT REPLACEMENT SURGERY: ICD-10-CM

## 2017-10-20 DIAGNOSIS — M25.561 RIGHT KNEE PAIN, UNSPECIFIED CHRONICITY: ICD-10-CM

## 2017-10-20 DIAGNOSIS — Z96.651 AFTERCARE FOLLOWING RIGHT KNEE JOINT REPLACEMENT SURGERY: ICD-10-CM

## 2017-10-20 PROCEDURE — 97112 NEUROMUSCULAR REEDUCATION: CPT | Mod: GP | Performed by: PHYSICAL THERAPIST

## 2017-10-20 PROCEDURE — 97140 MANUAL THERAPY 1/> REGIONS: CPT | Mod: GP | Performed by: PHYSICAL THERAPIST

## 2017-10-20 PROCEDURE — 97110 THERAPEUTIC EXERCISES: CPT | Mod: GP | Performed by: PHYSICAL THERAPIST

## 2017-10-25 ENCOUNTER — THERAPY VISIT (OUTPATIENT)
Dept: PHYSICAL THERAPY | Facility: CLINIC | Age: 67
End: 2017-10-25
Payer: COMMERCIAL

## 2017-10-25 DIAGNOSIS — Z47.1 AFTERCARE FOLLOWING RIGHT KNEE JOINT REPLACEMENT SURGERY: ICD-10-CM

## 2017-10-25 DIAGNOSIS — Z96.651 AFTERCARE FOLLOWING RIGHT KNEE JOINT REPLACEMENT SURGERY: ICD-10-CM

## 2017-10-25 DIAGNOSIS — M25.561 RIGHT KNEE PAIN, UNSPECIFIED CHRONICITY: ICD-10-CM

## 2017-10-25 PROCEDURE — 97140 MANUAL THERAPY 1/> REGIONS: CPT | Mod: GP | Performed by: PHYSICAL THERAPIST

## 2017-10-25 PROCEDURE — 97112 NEUROMUSCULAR REEDUCATION: CPT | Mod: GP | Performed by: PHYSICAL THERAPIST

## 2017-10-25 PROCEDURE — 97110 THERAPEUTIC EXERCISES: CPT | Mod: GP | Performed by: PHYSICAL THERAPIST

## 2017-10-25 NOTE — PROGRESS NOTES
Subjective:    HPI                    Objective:    System    Physical Exam    General     ROS    Assessment/Plan:      PROGRESS  REPORT    Progress reporting period is from  9/25/17 to 10/25/17.       SUBJECTIVE  Subjective: Knee is continually improving. Motion and strength are improving. Going up stairs reciprocally and going down is still challenging. Walking around community block, mostly without cane. Aleve for swelling and pain (2X/day).     Current Pain level: 0/10.      Initial Pain level: 5/10.   Changes in function:  Yes (See Goal flowsheet attached for changes in current functional level)  Adverse reaction to treatment or activity: None    OBJECTIVE  Changes noted in objective findings:  Yes,   Objective: AROM: R knee= 2-115. Slight swelling in suprapatellar pouch and within joint (effusion). Incline bike easy today. Good tolerance to leg press (double and single leg). Good technique with HEP; added squats today (educated on no knees over toes, pt tends to weight shift to left away from surgical side). Improving balance. Pt is progressing very well.      ASSESSMENT/PLAN  Updated problem list and treatment plan: Diagnosis 1:  R knee pain; s/p TKA  Pain -  hot/cold therapy, manual therapy, self management, education and home program  Decreased ROM/flexibility - manual therapy and therapeutic exercise  Decreased joint mobility - manual therapy and therapeutic exercise  Decreased strength - therapeutic exercise and therapeutic activities  Impaired balance - neuro re-education and therapeutic activities  Decreased proprioception - neuro re-education and therapeutic activities  Inflammation - cold therapy and self management/home program  Edema - cold therapy and self management/home program  Impaired gait - gait training  Impaired muscle performance - neuro re-education  Decreased function - therapeutic activities  Instability -  Therapeutic Activity  Therapeutic Exercise  STG/LTGs have been met or progress  has been made towards goals:  Yes (See Goal flow sheet completed today.)  Assessment of Progress: The patient's condition is improving.  The patient's condition has potential to improve.  Self Management Plans:  Patient has been instructed in a home treatment program.  I have re-evaluated this patient and find that the nature, scope, duration and intensity of the therapy is appropriate for the medical condition of the patient.  Walker continues to require the following intervention to meet STG and LTG's:  PT    Recommendations:  This patient would benefit from continued therapy.     Frequency:  1 X week, once daily  Duration:  for 4 weeks          Please refer to the daily flowsheet for treatment today, total treatment time and time spent performing 1:1 timed codes.

## 2017-10-25 NOTE — LETTER
[facility]  [address]  [facility] [address]  [phone]    2017    Re: Walker Ortiz   :   1950  MRN:  [id number]   REFERRING PHYSICIAN:   Dominik Ramirez    [facility]  Date of Initial Evaluation:  17  Visits:  Rxs Used: 9  Reason for Referral:     Right knee pain, unspecified chronicity  Aftercare following right knee joint replacement surgery    PROGRESS  REPORT  Progress reporting period is from  17 to 10/25/17.       SUBJECTIVE  Subjective: Knee is continually improving. Motion and strength are improving. Going up stairs reciprocally and going down is still challenging. Walking around community block, mostly without cane. Aleve for swelling and pain (2X/day).     Current Pain level: 0/10.      Initial Pain level: 5/10.   Changes in function:  Yes (See Goal flowsheet attached for changes in current functional level)  Adverse reaction to treatment or activity: None    OBJECTIVE  Changes noted in objective findings:  Yes,   Objective: AROM: R knee= 2-115. Slight swelling in suprapatellar pouch and within joint (effusion). Incline bike easy today. Good tolerance to leg press (double and single leg). Good technique with HEP; added squats today (educated on no knees over toes, pt tends to weight shift to left away from surgical side). Improving balance. Pt is progressing very well.      ASSESSMENT/PLAN  Updated problem list and treatment plan: Diagnosis 1:  R knee pain; s/p TKA  Pain -  hot/cold therapy, manual therapy, self management, education and home program  Decreased ROM/flexibility - manual therapy and therapeutic exercise  Decreased joint mobility - manual therapy and therapeutic exercise  Decreased strength - therapeutic exercise and therapeutic activities  Impaired balance - neuro re-education and therapeutic activities  Decreased proprioception - neuro  re-education and therapeutic activities  Inflammation - cold therapy and self management/home program  Edema - cold therapy and self management/home program  Impaired gait - gait training  Impaired muscle performance - neuro re-education  Decreased function - therapeutic activities  Instability -  Therapeutic Activity  Re: Walker Ortiz   :   1950    Therapeutic Exercise  STG/LTGs have been met or progress has been made towards goals:  Yes (See Goal flow sheet completed today.)  Assessment of Progress: The patient's condition is improving.  The patient's condition has potential to improve.  Self Management Plans:  Patient has been instructed in a home treatment program.  I have re-evaluated this patient and find that the nature, scope, duration and intensity of the therapy is appropriate for the medical condition of the patient.  Walker continues to require the following intervention to meet STG and LTG's:  PT    Recommendations:  This patient would benefit from continued therapy.     Frequency:  1 X week, once daily  Duration:  for 4 weeks      Thank you for your referral.    INQUIRIES  Therapist: Leatha Enamorado, PT  [facility]  [address] [place], [place] [id number] [address]  Phone: [phone]  Fax: [phone]

## 2017-10-25 NOTE — MR AVS SNAPSHOT
"              After Visit Summary   10/25/2017    Walker Ortiz    MRN: [id number]           Patient Information     Date Of Birth          1950        Visit Information        Provider Department      10/25/2017 1:00 PM Leatha Enamorado PT [facility] [facility]        Today's Diagnoses     Right knee pain, unspecified chronicity        Aftercare following right knee joint replacement surgery           Follow-ups after your visit        Your next 10 appointments already scheduled     Nov 01, 2017 11:00 AM CDT   ALBA Extremity with Leatha Enamorado PT   [facility] ([facility] [place]  )    [address]  [facility] [facility] [address] [phone]              Who to contact     If you have questions or need follow up information about today's clinic visit or your schedule please contact [place] [facility] directly at [phone].  Normal or non-critical lab and imaging results will be communicated to you by [employer], letter or phone within 4 business days after the clinic has received the results. If you do not hear from us within 7 days, please contact the clinic through [employer] or phone. If you have a critical or abnormal lab result, we will notify you by phone as soon as possible.  Submit refill requests through [employer] or call your pharmacy and they will forward the refill request to us. Please allow 3 business days for your refill to be completed.          Additional Information About Your Visit        Barr[employer] Foodshar[employer]etta Information     Mapbar lets you send messages to your doctor, view your test results, renew your prescriptions, schedule appointments and more. To sign up, go to [email or web address] . Click on \"Log in\" on the left side of the screen, which will take you to the Welcome page. Then click on \"Sign up Now\" on the right side of the page.     You will be asked to enter the access code " listed below, as well as some personal information. Please follow the directions to create your username and password.     Your access code is: [email or web address]  Expires: 2017  4:01 PM     Your access code will  in 90 days. If you need help or a new code, please call your [place] clinic or [phone].        Care EveryWhere ID     This is your Care EveryWhere ID. This could be used by other organizations to access your [place] medical records  [id number]         Blood Pressure from Last 3 Encounters:   17 (!) 140/92   17 118/72   16 115/80    Weight from Last 3 Encounters:   17 94.8 kg (209 lb)   17 95.3 kg (210 lb)   16 95.7 kg (211 lb)              We Performed the Following     [facility] PROGRESS NOTES REPORT     MANUAL THER TECH,1+REGIONS,EA 15 MIN     NEUROMUSCULAR RE-EDUCATION     THERAPEUTIC EXERCISES        Primary Care Provider Office Phone # Fax #    Bhavana Mullen [phone] [address] [facility] [address]        Equal Access to Services     [facility]: Hadii aad ku hadasho Soomaali, waaxda luqadaha, qaybta kaalmada adeegyada, waxay mirlandein haynhungn etienne perez labear ah. So [facility] [phone].    ATENCIÓN: Si habla español, tiene a cheney disposición servicios gratuitos de asistencia lingüística. Erikaame al [phone].    We comply with applicable federal civil rights laws and [place] laws. We do not discriminate on the basis of race, color, national origin, age, disability, sex, sexual orientation, or gender identity.            Thank you!     Thank you for choosing [facility]  for your care. Our goal is always to provide you with excellent care. Hearing back from our patients is one way we can continue to improve our services. Please take a few minutes to complete the written survey that you may receive in the mail after your visit with us. Thank you!             Your Updated  Medication List - Protect others around you: Learn how to safely use, store and throw away your medicines at www.disposemymeds.org.          This list is accurate as of: 10/25/17  1:35 PM.  Always use your most recent med list.                   Brand Name Dispense Instructions for use Diagnosis    BENADRYL PO      Take 25 mg by mouth every 4 hours as needed for allergies        HYDROmorphone 2 MG tablet    DILAUDID    60 tablet    Take 1-2 tablets (2-4 mg) by mouth every 3 hours as needed for moderate to severe pain    Right knee pain, unspecified chronicity       prednisoLONE acetate 1 % ophthalmic susp    PRED FORTE     Place 1 drop Into the left eye At Bedtime        senna-docusate 8.6-50 MG per tablet    SENOKOT-S;PERICOLACE    60 tablet    Take 1-2 tablets by mouth 2 times daily as needed for constipation    Right knee pain, unspecified chronicity       ZOCOR PO      Take 10 mg by mouth At Bedtime

## 2017-10-31 ENCOUNTER — TRANSFERRED RECORDS (OUTPATIENT)
Dept: FAMILY MEDICINE | Facility: CLINIC | Age: 67
End: 2017-10-31

## 2018-01-12 DIAGNOSIS — E78.5 HYPERLIPIDEMIA LDL GOAL <130: Primary | ICD-10-CM

## 2018-01-12 RX ORDER — SIMVASTATIN 10 MG
TABLET ORAL
Qty: 30 TABLET | Refills: 0 | Status: SHIPPED | OUTPATIENT
Start: 2018-01-12 | End: 2018-02-16

## 2018-02-02 NOTE — PROGRESS NOTES
18 Received a fax from [facility], the order for AAA ultrasound has  and the patient did not return their calls.  Dr. Mullen was informed.    Tomas Burrell,   [facility]  [phone]

## 2018-02-16 DIAGNOSIS — E78.5 HYPERLIPIDEMIA LDL GOAL <130: ICD-10-CM

## 2018-02-16 RX ORDER — SIMVASTATIN 10 MG
TABLET ORAL
Qty: 30 TABLET | Refills: 0 | Status: SHIPPED | OUTPATIENT
Start: 2018-02-16 | End: 2018-03-19

## 2018-03-19 DIAGNOSIS — E78.5 HYPERLIPIDEMIA LDL GOAL <130: ICD-10-CM

## 2018-03-19 RX ORDER — SIMVASTATIN 10 MG
TABLET ORAL
Qty: 30 TABLET | Refills: 3 | Status: SHIPPED | OUTPATIENT
Start: 2018-03-19 | End: 2018-03-27

## 2018-03-19 NOTE — TELEPHONE ENCOUNTER
Last OV (PreOp) 9/11/17  Last Labs 9/11/17    OV Scheduled for 3/27/18    Cholesterol   Date Value Ref Range Status   09/11/2017 169 100 - 199 mg/dL Final   12/28/2016 145 100 - 199 mg/dL Final     HDL Cholesterol   Date Value Ref Range Status   09/11/2017 48 >39 mg/dL Final   12/28/2016 41 >39 mg/dL Final     LDL Cholesterol Calculated   Date Value Ref Range Status   09/11/2017 80 0 - 99 mg/dL Final   12/28/2016 75 0 - 99 mg/dL Final     Triglycerides   Date Value Ref Range Status   09/11/2017 204 (H) 0 - 149 mg/dL Final   12/28/2016 147 0 - 149 mg/dL Final     Cholesterol/HDL Ratio   Date Value Ref Range Status   06/02/2006 5.2 (H) 0.0 - 5.0 Final   07/17/2004 5.5 (H) 0.0 - 5.0 Final

## 2018-03-24 ENCOUNTER — HEALTH MAINTENANCE LETTER (OUTPATIENT)
Age: 68
End: 2018-03-24

## 2018-03-27 ENCOUNTER — OFFICE VISIT (OUTPATIENT)
Dept: FAMILY MEDICINE | Facility: CLINIC | Age: 68
End: 2018-03-27

## 2018-03-27 VITALS
OXYGEN SATURATION: 98 % | HEART RATE: 62 BPM | BODY MASS INDEX: 31.31 KG/M2 | SYSTOLIC BLOOD PRESSURE: 136 MMHG | DIASTOLIC BLOOD PRESSURE: 90 MMHG | WEIGHT: 212 LBS | RESPIRATION RATE: 16 BRPM

## 2018-03-27 DIAGNOSIS — E78.00 HYPERCHOLESTEROLEMIA: Primary | ICD-10-CM

## 2018-03-27 DIAGNOSIS — E78.5 HYPERLIPIDEMIA LDL GOAL <130: ICD-10-CM

## 2018-03-27 DIAGNOSIS — N52.9 ERECTILE DYSFUNCTION, UNSPECIFIED ERECTILE DYSFUNCTION TYPE: ICD-10-CM

## 2018-03-27 PROCEDURE — 80061 LIPID PANEL: CPT | Mod: 90 | Performed by: FAMILY MEDICINE

## 2018-03-27 PROCEDURE — 36415 COLL VENOUS BLD VENIPUNCTURE: CPT | Performed by: FAMILY MEDICINE

## 2018-03-27 PROCEDURE — 99213 OFFICE O/P EST LOW 20 MIN: CPT | Performed by: FAMILY MEDICINE

## 2018-03-27 RX ORDER — AMOXICILLIN 500 MG/1
CAPSULE ORAL
Refills: 0 | COMMUNITY
Start: 2018-03-02 | End: 2018-03-27

## 2018-03-27 RX ORDER — VARDENAFIL HYDROCHLORIDE 10 MG/1
10 TABLET ORAL DAILY PRN
Qty: 2 TABLET | Refills: 0 | COMMUNITY
Start: 2018-03-27 | End: 2019-03-21

## 2018-03-27 RX ORDER — SIMVASTATIN 10 MG
10 TABLET ORAL AT BEDTIME
Qty: 90 TABLET | Refills: 3 | Status: SHIPPED | OUTPATIENT
Start: 2018-03-27 | End: 2019-03-21

## 2018-03-27 NOTE — LETTER
[place] [address] [place]  [address]  Phone: [phone]    March 29, 2018      Walker Ortiz  [address] [address]              Dear Walker,    The results from your recent visit showed that these results are great. Don't change anything.        Sincerely,     Raghavendra Soto M.D.    Results for orders placed or performed in visit on 03/27/18   Lipid Panel ([facility])   Result Value Ref Range    Cholesterol 155 100 - 199 mg/dL    Triglycerides 119 0 - 149 mg/dL    HDL Cholesterol 42 >39 mg/dL    VLDL Cholesterol Julio Cesar 24 5 - 40 mg/dL    LDL Cholesterol Calculated 89 0 - 99 mg/dL    LDL/HDL Ratio 2.1 0.0 - 3.6 ratio units    Narrative    Performed at:  01 - [facility] [address], [place], [place]  [id number]  : Diallo Soto MD, Phone:  [phone]

## 2018-03-27 NOTE — MR AVS SNAPSHOT
"              After Visit Summary   3/27/2018    Walker Ortiz    MRN: [id number]           Patient Information     Date Of Birth          1950        Visit Information        Provider Department      3/27/2018 8:00 AM Raghavendra Soto MD [facility]        Today's Diagnoses     Hypercholesterolemia    -  1    Hyperlipidemia LDL goal <130        Erectile dysfunction, unspecified erectile dysfunction type           Follow-ups after your visit        Who to contact     If you have questions or need follow up information about today's clinic visit or your schedule please contact [facility] directly at [phone].  Normal or non-critical lab and imaging results will be communicated to you by Veducahart, letter or phone within 4 business days after the clinic has received the results. If you do not hear from us within 7 days, please contact the clinic through [employer] or phone. If you have a critical or abnormal lab result, we will notify you by phone as soon as possible.  Submit refill requests through [employer] or call your pharmacy and they will forward the refill request to us. Please allow 3 business days for your refill to be completed.          Additional Information About Your Visit        MyChart Information     [employer] lets you send messages to your doctor, view your test results, renew your prescriptions, schedule appointments and more. To sign up, go to [email or web address] . Click on \"Log in\" on the left side of the screen, which will take you to the Welcome page. Then click on \"Sign up Now\" on the right side of the page.     You will be asked to enter the access code listed below, as well as some personal information. Please follow the directions to create your username and password.     Your access code is: [email or web address]  Expires: 2018 10:41 AM     Your access code will  in 90 days. If you need help or a new code, please call your [place] clinic or " [phone].        Care EveryWhere ID     This is your Care EveryWhere ID. This could be used by other organizations to access your [place] medical records  [id number]        Your Vitals Were     Pulse Respirations Pulse Oximetry BMI (Body Mass Index)          62 16 98% 31.31 kg/m2         Blood Pressure from Last 3 Encounters:   03/27/18 136/90   09/21/17 (!) 140/92   09/11/17 118/72    Weight from Last 3 Encounters:   03/27/18 96.2 kg (212 lb)   09/18/17 94.8 kg (209 lb)   09/11/17 95.3 kg (210 lb)              We Performed the Following     Lipid Panel ([facility])     VENOUS COLLECTION          Today's Medication Changes          These changes are accurate as of 3/27/18 10:41 AM.  If you have any questions, ask your nurse or doctor.               These medicines have changed or have updated prescriptions.        Dose/Directions    simvastatin 10 MG tablet   Commonly known as:  ZOCOR   This may have changed:  See the new instructions.   Used for:  Hyperlipidemia LDL goal <130   Changed by:  Raghavendra Soto MD        Dose:  10 mg   Take 1 tablet (10 mg) by mouth At Bedtime   Quantity:  90 tablet   Refills:  3         Stop taking these medicines if you haven't already. Please contact your care team if you have questions.     HYDROmorphone 2 MG tablet   Commonly known as:  DILAUDID   Stopped by:  Raghavendra Soto MD                Where to get your medicines      Some of these will need a paper prescription and others can be bought over the counter.  Ask your nurse if you have questions.     Bring a paper prescription for each of these medications     simvastatin 10 MG tablet                Primary Care Provider Office Phone # Fax #    Bhavana Mullen [phone] [address]  [facility] [address]        Equal Access to Services     SCOTT NOLAND : Melissa Peck, waaxda luqadaha, qaybta kaalmada adriana, saadia contreras. So [facility]  [phone].    ATENCIÓN: Si eric boyce, tiene a cheney disposición servicios gratuitos de asistencia lingüística. Savannah childers [phone].    We comply with applicable federal civil rights laws and [place] laws. We do not discriminate on the basis of race, color, national origin, age, disability, sex, sexual orientation, or gender identity.            Thank you!     Thank you for choosing [facility]  for your care. Our goal is always to provide you with excellent care. Hearing back from our patients is one way we can continue to improve our services. Please take a few minutes to complete the written survey that you may receive in the mail after your visit with us. Thank you!             Your Updated Medication List - Protect others around you: Learn how to safely use, store and throw away your medicines at www.disposemymeds.org.          This list is accurate as of 3/27/18 10:41 AM.  Always use your most recent med list.                   Brand Name Dispense Instructions for use Diagnosis    BENADRYL PO      Take 25 mg by mouth every 4 hours as needed for allergies        prednisoLONE acetate 1 % ophthalmic susp    PRED FORTE     Place 1 drop Into the left eye At Bedtime        senna-docusate 8.6-50 MG per tablet    SENOKOT-S;PERICOLACE    60 tablet    Take 1-2 tablets by mouth 2 times daily as needed for constipation    Right knee pain, unspecified chronicity       simvastatin 10 MG tablet    ZOCOR    90 tablet    Take 1 tablet (10 mg) by mouth At Bedtime    Hyperlipidemia LDL goal <130       vardenafil 10 MG tablet    LEVITRA    2 tablet    Take 1 tablet (10 mg) by mouth daily as needed 60 min prior to sex. Do not use with nitroglycerin, terazosin or doxazosin.    Erectile dysfunction, unspecified erectile dysfunction type

## 2018-03-27 NOTE — PROGRESS NOTES
Problem(s) Oriented visit        SUBJECTIVE:                                                    Walker Ortiz is a 67 year old male who presents to clinic today for the following health issues :      1. Hypercholesterolemia  No FH of CAD  - Lipid Panel ([facility])    2. Hyperlipidemia LDL goal <130    - simvastatin (ZOCOR) 10 MG tablet         Problem list, Medication list, Allergies, and Medical/Social/Surgical histories reviewed in [facility] and updated as appropriate.   Additional history: as documented    ROS:  General and CV completed and negative except as noted above    Histories:   Patient Active Problem List   Diagnosis     CARDIOVASCULAR SCREENING; LDL GOAL LESS THAN 160     Knee joint replacement status     Health Care Home     Allergic rhinitis     Diverticulosis     Cataract associated with another disorder     Advanced directives, counseling/discussion     Hypercholesterolemia     Right knee pain, unspecified chronicity     Aftercare following right knee joint replacement surgery     Past Surgical History:   Procedure Laterality Date     ADJUST SUTURE EYE POST PROCEDURE  9/20/2013    Procedure: ADJUST SUTURE EYE POST PROCEDURE;  LEFT EYE PLACE CORNEAL SUTURES;  Surgeon: Tutu Coe MD;  [facility]:  [facility]     APPENDECTOMY      incidential during colon surgery     ARTHROPLASTY KNEE  9/6/2012    Procedure: ARTHROPLASTY KNEE;  LEFT TOTAL KNEE ARTHROPLASTY (BIOMET)^;  Surgeon: Dominik Ramirez MD;  Location:  OR     ARTHROPLASTY KNEE Right 9/18/2017    Procedure: ARTHROPLASTY KNEE;  RIGHT TOTAL KNEE ARTHROPLASTY ;  Surgeon: Dominik Ramirez MD;  Location:  OR     C NONSPECIFIC PROCEDURE      ulnar nerve resection - R     C NONSPECIFIC PROCEDURE      B/L arthroscopy for cartilage     C NONSPECIFIC PROCEDURE  6/1992    meniscectomy on L     C NONSPECIFIC PROCEDURE  11/1979    R wrist ganglion cyst removed     EYE SURGERY  5/2012    cornea transplant     PHACOEMULSIFICATION CLEAR  CORNEA WITH TORIC INTRAOCULAR LENS IMPLANT  9/17/2013    Procedure: PHACOEMULSIFICATION CLEAR CORNEA WITH TORIC INTRAOCULAR LENS IMPLANT;  LEFT PHACOEMULSIFICATION CLEAR CORNEA WITH TORIC INTRAOCULAR LENS IMPLANT ;  Surgeon: Tutu Coe MD;  Location:  [facility]     SIGMOIDECTOMY  1995    for diverticulitis       Social History   Substance Use Topics     Smoking status: Never Smoker     Smokeless tobacco: Never Used     Alcohol use Yes      Comment: 1 a month     Family History   Problem Relation Age of Onset     CANCER Father      d:age 51 lymphoma     Cardiovascular Mother      d:age 41  hx of rheumatic fever     Family History Negative Sister      b:1949     Allergies Sister      b:1950     Family History Negative Brother      b:1952     Family History Negative Brother      b:1954     Allergies Sister      b:1956     Family History Negative Brother      b:1958     Family History Negative Brother      b:1960     Family History Negative Brother      b:1962     Allergies Paternal Grandmother      DIABETES Maternal Grandmother            OBJECTIVE:                                                    /90  Pulse 62  Resp 16  Wt 96.2 kg (212 lb)  SpO2 98%  BMI 31.31 kg/m2  Body mass index is 31.31 kg/(m^2).   APPEARANCE: = Relaxed and in no distress  Resp effort = Calm regular breathing  Breath Sounds = Good air movement with no rales or rhonchi in any lung fields  Heart Rate, Rythym, & sounds (no Murm)  = Regular rate and rythym with no S3, S4, or murmer appreciated.  Ext (edema) = No pretibial edema noted or elsewhere  Recent/Remote Memory = Alert and Oriented x 3  Mood/Affect = Cooperative and interested     ASSESSMENT/PLAN:                                                        Walker was seen today for recheck medication.    Diagnoses and all orders for this visit:    Hypercholesterolemia  -     Lipid Panel ([facility])    Hyperlipidemia LDL goal <130  -     simvastatin (ZOCOR) 10 MG tablet; Take 1  tablet (10 mg) by mouth At Bedtime      Repeat visit, preventative care in 1 year.      The following health maintenance items are reviewed in Epic and correct as of today:  Health Maintenance   Topic Date Due     AORTIC ANEURYSM SCREENING (SYSTEM ASSIGNED)  08/10/2016     FALL RISK ASSESSMENT  12/28/2017     COLONOSCOPY Q10 YR  01/24/2018     PNEUMOCOCCAL (2 of 2 - PPSV23) 08/19/2018     INFLUENZA VACCINE (SYSTEM ASSIGNED)  09/01/2018     PSA Q1 YR  09/11/2018     LIPID SCREEN Q5 YR MALE (SYSTEM ASSIGNED)  09/11/2022     ADVANCE DIRECTIVE PLANNING Q5 YRS  09/11/2022     TETANUS IMMUNIZATION (SYSTEM ASSIGNED)  12/28/2026     HEPATITIS C SCREENING  Completed     He does not need AAA screen, never smoked.  Raghavendra Soto MD  [facility]  Family Practice  [facility]  [phone]    For any issues my office # is [phone]

## 2018-03-28 LAB
CHOLEST SERPL-MCNC: 155 MG/DL (ref 100–199)
HDLC SERPL-MCNC: 42 MG/DL
LDL/HDL RATIO: 2.1 RATIO UNITS (ref 0–3.6)
LDLC SERPL CALC-MCNC: 89 MG/DL (ref 0–99)
TRIGL SERPL-MCNC: 119 MG/DL (ref 0–149)
VLDLC SERPL CALC-MCNC: 24 MG/DL (ref 5–40)

## 2018-11-19 ENCOUNTER — OFFICE VISIT (OUTPATIENT)
Dept: FAMILY MEDICINE | Facility: CLINIC | Age: 68
End: 2018-11-19

## 2018-11-19 VITALS
DIASTOLIC BLOOD PRESSURE: 86 MMHG | HEART RATE: 68 BPM | BODY MASS INDEX: 31.31 KG/M2 | OXYGEN SATURATION: 98 % | WEIGHT: 212 LBS | RESPIRATION RATE: 16 BRPM | SYSTOLIC BLOOD PRESSURE: 132 MMHG

## 2018-11-19 DIAGNOSIS — R05.9 COUGH: ICD-10-CM

## 2018-11-19 DIAGNOSIS — J01.10 ACUTE NON-RECURRENT FRONTAL SINUSITIS: Primary | ICD-10-CM

## 2018-11-19 PROCEDURE — 99213 OFFICE O/P EST LOW 20 MIN: CPT | Performed by: FAMILY MEDICINE

## 2018-11-19 RX ORDER — BENZONATATE 200 MG/1
200 CAPSULE ORAL 3 TIMES DAILY PRN
Qty: 21 CAPSULE | Refills: 0 | Status: SHIPPED | OUTPATIENT
Start: 2018-11-19 | End: 2018-11-26

## 2018-11-19 NOTE — PROGRESS NOTES
SUBJECTIVE:   Walker Ortiz is a 68 year old male who presents to clinic today for the following health issues:    White male  Never smoker  Exposure grandson ill    Had flu and pneumonia vaccines  On list for Shingles vaccine    He was ill 3 weeks then better now cough- when first up phlegm. Left side ? . Frontal pressure with HA. ST and halls cough drop helped.  Concern - URI Sx  Onset: 1 1/2 week    Description:   Sinus congestion, cough, running nose,headache    Intensity: severe    Progression of Symptoms:  worsening    Accompanying Signs & Symptoms:  none    Previous history of similar problem:   none    Precipitating factors:   Worsened by: none    Alleviating factors:  Improved by: none    Therapies Tried and outcome: dayquil,musinex, no relief    16 oz fluids  Urine ok  Rash no  Eating some  No N/V/D  Sleep interrupted by cough    Preop next Monday for bunion surgery Dec 4th        Problem list and histories reviewed & adjusted, as indicated.  Additional history: as documented    Patient Active Problem List   Diagnosis     CARDIOVASCULAR SCREENING; LDL GOAL LESS THAN 160     Knee joint replacement status     Health Care Home     Allergic rhinitis     Diverticulosis     Cataract associated with another disorder     Advanced directives, counseling/discussion     Hypercholesterolemia     Right knee pain, unspecified chronicity     Aftercare following right knee joint replacement surgery     Past Surgical History:   Procedure Laterality Date     ADJUST SUTURE EYE POST PROCEDURE  9/20/2013    Procedure: ADJUST SUTURE EYE POST PROCEDURE;  LEFT EYE PLACE CORNEAL SUTURES;  Surgeon: Tutu Coe MD;  Location:  [facility]     APPENDECTOMY      incidential during colon surgery     ARTHROPLASTY KNEE  9/6/2012    Procedure: ARTHROPLASTY KNEE;  LEFT TOTAL KNEE ARTHROPLASTY (BIOMET)^;  Surgeon: Dominik Ramirez MD;  Location:  OR     ARTHROPLASTY KNEE Right 9/18/2017    Procedure: ARTHROPLASTY KNEE;   RIGHT TOTAL KNEE ARTHROPLASTY ;  Surgeon: Dominik Ramirez MD;  Location: [facility]     C NONSPECIFIC PROCEDURE      ulnar nerve resection - R     C NONSPECIFIC PROCEDURE      B/L arthroscopy for cartilage     C NONSPECIFIC PROCEDURE  6/1992    meniscectomy on L     C NONSPECIFIC PROCEDURE  11/1979    R wrist ganglion cyst removed     EYE SURGERY  5/2012    cornea transplant     PHACOEMULSIFICATION CLEAR CORNEA WITH TORIC INTRAOCULAR LENS IMPLANT  9/17/2013    Procedure: PHACOEMULSIFICATION CLEAR CORNEA WITH TORIC INTRAOCULAR LENS IMPLANT;  LEFT PHACOEMULSIFICATION CLEAR CORNEA WITH TORIC INTRAOCULAR LENS IMPLANT ;  Surgeon: Tutu Coe MD;  Location:  [facility]     SIGMOIDECTOMY  1995    for diverticulitis       Social History   Substance Use Topics     Smoking status: Never Smoker     Smokeless tobacco: Never Used     Alcohol use Yes      Comment: 1 a month     Family History   Problem Relation Age of Onset     Cancer Father      d:age 51 lymphoma     Cardiovascular Mother      d:age 41  hx of rheumatic fever     Family History Negative Sister      b:1949     Allergies Sister      b:1950     Family History Negative Brother      b:1952     Family History Negative Brother      b:1954     Allergies Sister      b:1956     Family History Negative Brother      b:1958     Family History Negative Brother      b:1960     Family History Negative Brother      b:1962     Allergies Paternal Grandmother      Diabetes Maternal Grandmother          Current Outpatient Prescriptions   Medication Sig Dispense Refill     DiphenhydrAMINE HCl (BENADRYL PO) Take 25 mg by mouth every 4 hours as needed for allergies       prednisoLONE acetate (PRED FORTE) 1 % ophthalmic suspension Place 1 drop Into the left eye At Bedtime   5     senna-docusate (SENOKOT-S;PERICOLACE) 8.6-50 MG per tablet Take 1-2 tablets by mouth 2 times daily as needed for constipation 60 tablet 0     simvastatin (ZOCOR) 10 MG tablet Take 1 tablet (10 mg) by  mouth At Bedtime 90 tablet 3     vardenafil (LEVITRA) 10 MG tablet Take 1 tablet (10 mg) by mouth daily as needed 60 min prior to sex. Do not use with nitroglycerin, terazosin or doxazosin. 2 tablet 0     Allergies   Allergen Reactions     No Known Drug Allergies      Recent Labs   Lab Test  03/27/18   0843  09/21/17   0656  09/18/17   1125  09/11/17   1540  12/28/16   0919  09/16/15   0944   LDL  89   --    --   80  75  98   HDL  42   --    --   48  41  47   TRIG  119   --    --   204*  147  194*   ALT   --    --   40  23   --   29   CR   --   1.00  0.92  1.39*   --   0.87   GFRESTIMATED   --   75  82   --    --    --    GFRESTBLACK   --   >90  >90   --    --    --    POTASSIUM   --    --   3.9  4.4   --   4.8      BP Readings from Last 3 Encounters:   11/19/18 132/86   03/27/18 136/90   09/21/17 (!) 140/92    Wt Readings from Last 3 Encounters:   11/19/18 96.2 kg (212 lb)   03/27/18 96.2 kg (212 lb)   09/18/17 94.8 kg (209 lb)                  Labs reviewed in EPIC    Reviewed and updated as needed this visit by clinical staff       Reviewed and updated as needed this visit by Provider         ROS:  Constitutional, HEENT, cardiovascular, pulmonary, GI, , musculoskeletal, neuro, skin, endocrine and psych systems are negative, except as otherwise noted.    OBJECTIVE:     /86  Pulse 68  Resp 16  Wt 96.2 kg (212 lb)  SpO2 98%  BMI 31.31 kg/m2  Body mass index is 31.31 kg/(m^2).  GENERAL: healthy, alert and no distress  EYES: Eyes grossly normal to inspection, PERRL and conjunctivae and sclerae normal  HENT: ear canals and TM's normal, nose and mouth without ulcers or lesions Frontal sinus tenderness  NECK: no adenopathy, no asymmetry, masses, or scars and thyroid normal to palpation  RESP: lungs clear to auscultation - no rales, rhonchi or wheezes  CV: regular rate and rhythm, normal S1 S2, no S3 or S4, no murmur, click or rub, no peripheral edema and peripheral pulses strong  ABDOMEN: soft, nontender, no  hepatosplenomegaly, no masses and bowel sounds normal  MS: no gross musculoskeletal defects noted, no edema  SKIN: no suspicious lesions or rashes  NEURO: Normal strength and tone, mentation intact and speech normal  PSYCH: mentation appears normal, affect normal/bright  LYMPH: no cervical, supraclavicular, axillary, or inguinal adenopathy    Diagnostic Test Results:  Results for orders placed or performed in visit on 03/27/18   Lipid Panel ([facility])   Result Value Ref Range    Cholesterol 155 100 - 199 mg/dL    Triglycerides 119 0 - 149 mg/dL    HDL Cholesterol 42 >39 mg/dL    VLDL Cholesterol Julio Cesar 24 5 - 40 mg/dL    LDL Cholesterol Calculated 89 0 - 99 mg/dL    LDL/HDL Ratio 2.1 0.0 - 3.6 ratio units    Narrative    Performed at:  01 - [facility] [address], [place], [place]  [id number]  : Diallo Soto MD, Phone:  [phone]       ASSESSMENT/PLAN:     ASSESSMENT / PLAN:  (J01.10) Acute non-recurrent frontal sinusitis  (primary encounter diagnosis)  Comment:   Plan: amoxicillin-clavulanate (AUGMENTIN) 875-125 MG         per tablet            (R05) Cough  Comment:   Plan: benzonatate (TESSALON) 200 MG capsule                Patient Instructions   Augmentin antibiotic    Tessalon perles for cough          Bhavana Mullen MD  [facility]

## 2018-11-19 NOTE — MR AVS SNAPSHOT
"              After Visit Summary   11/19/2018    Walker Ortiz    MRN: [id number]           Patient Information     Date Of Birth          1950        Visit Information        Provider Department      11/19/2018 11:15 AM Bhavana Mullen MD [facility]        Today's Diagnoses     Acute non-recurrent frontal sinusitis    -  1    Cough          Care Instructions    Augmentin antibiotic    Tessalon perles for cough              Follow-ups after your visit        Your next 10 appointments already scheduled     Nov 26, 2018  9:30 AM CST   Pre-Op physical with Sherwin Woods MD   [facility] ([facility])    [address] [place] [address] [phone]              Who to contact     If you have questions or need follow up information about today's clinic visit or your schedule please contact [facility] directly at [phone].  Normal or non-critical lab and imaging results will be communicated to you by [employer], letter or phone within 4 business days after the clinic has received the results. If you do not hear from us within 7 days, please contact the clinic through [employer] or phone. If you have a critical or abnormal lab result, we will notify you by phone as soon as possible.  Submit refill requests through [employer] or call your pharmacy and they will forward the refill request to us. Please allow 3 business days for your refill to be completed.          Additional Information About Your Visit        MyChart Information     [employer] lets you send messages to your doctor, view your test results, renew your prescriptions, schedule appointments and more. To sign up, go to [email or web address] . Click on \"Log in\" on the left side of the screen, which will take you to the Welcome page. Then click on \"Sign up Now\" on the right side of the page.     You will be asked to enter the access code listed below, as well as some personal " information. Please follow the directions to create your username and password.     Your access code is: [email or web address]  Expires: 2019 12:27 PM     Your access code will  in 90 days. If you need help or a new code, please call your [place] clinic or [phone].        Care EveryWhere ID     This is your Care EveryWhere ID. This could be used by other organizations to access your [place] medical records  [id number]        Your Vitals Were     Pulse Respirations Pulse Oximetry BMI (Body Mass Index)          68 16 98% 31.31 kg/m2         Blood Pressure from Last 3 Encounters:   18 132/86   18 136/90   17 (!) 140/92    Weight from Last 3 Encounters:   18 96.2 kg (212 lb)   18 96.2 kg (212 lb)   17 94.8 kg (209 lb)              Today, you had the following     No orders found for display         Today's Medication Changes          These changes are accurate as of 18 12:27 PM.  If you have any questions, ask your nurse or doctor.               Start taking these medicines.        Dose/Directions    amoxicillin-clavulanate 875-125 MG per tablet   Commonly known as:  AUGMENTIN   Used for:  Acute non-recurrent frontal sinusitis   Started by:  Bhavana Mullen MD        Dose:  1 tablet   Take 1 tablet by mouth 2 times daily   Quantity:  28 tablet   Refills:  0       benzonatate 200 MG capsule   Commonly known as:  TESSALON   Used for:  Cough   Started by:  Bhavana Mullen MD        Dose:  200 mg   Take 1 capsule (200 mg) by mouth 3 times daily as needed for cough   Quantity:  21 capsule   Refills:  0            Where to get your medicines      These medications were sent to [id number] - [place], [place] - [address]  [address] [address]     Phone:  [phone]     amoxicillin-clavulanate 875-125 MG per tablet    benzonatate 200 MG capsule                Primary Care Provider Office Phone # Fax #    Bhavana Mullen MD  [phone]       [address]  [facility] [address]        Equal Access to Services     COSTA NOLAND : Hadii abram olivares cipriano Soklaus, waemilyda luqadaha, qaybta kaalmada adriana, saadia contreras. So [facility] [phone].    ATENCIÓN: Si habla español, tiene a cheney disposición servicios gratuitos de asistencia lingüística. Llame al [phone].    We comply with applicable federal civil rights laws and [place] laws. We do not discriminate on the basis of race, color, national origin, age, disability, sex, sexual orientation, or gender identity.            Thank you!     Thank you for choosing [facility]  for your care. Our goal is always to provide you with excellent care. Hearing back from our patients is one way we can continue to improve our services. Please take a few minutes to complete the written survey that you may receive in the mail after your visit with us. Thank you!             Your Updated Medication List - Protect others around you: Learn how to safely use, store and throw away your medicines at www.disposemymeds.org.          This list is accurate as of 11/19/18 12:27 PM.  Always use your most recent med list.                   Brand Name Dispense Instructions for use Diagnosis    amoxicillin-clavulanate 875-125 MG per tablet    AUGMENTIN    28 tablet    Take 1 tablet by mouth 2 times daily    Acute non-recurrent frontal sinusitis       BENADRYL PO      Take 25 mg by mouth every 4 hours as needed for allergies        benzonatate 200 MG capsule    TESSALON    21 capsule    Take 1 capsule (200 mg) by mouth 3 times daily as needed for cough    Cough       prednisoLONE acetate 1 % ophthalmic susp    PRED FORTE     Place 1 drop Into the left eye At Bedtime        senna-docusate 8.6-50 MG per tablet    SENOKOT-S;PERICOLACE    60 tablet    Take 1-2 tablets by mouth 2 times daily as needed for constipation    Right knee pain, unspecified chronicity        simvastatin 10 MG tablet    ZOCOR    90 tablet    Take 1 tablet (10 mg) by mouth At Bedtime    Hyperlipidemia LDL goal <130       vardenafil 10 MG tablet    LEVITRA    2 tablet    Take 1 tablet (10 mg) by mouth daily as needed 60 min prior to sex. Do not use with nitroglycerin, terazosin or doxazosin.    Erectile dysfunction, unspecified erectile dysfunction type

## 2018-11-26 ENCOUNTER — OFFICE VISIT (OUTPATIENT)
Dept: FAMILY MEDICINE | Facility: CLINIC | Age: 68
End: 2018-11-26

## 2018-11-26 VITALS
SYSTOLIC BLOOD PRESSURE: 124 MMHG | DIASTOLIC BLOOD PRESSURE: 80 MMHG | WEIGHT: 216.2 LBS | BODY MASS INDEX: 32.77 KG/M2 | HEIGHT: 68 IN | TEMPERATURE: 97.5 F | RESPIRATION RATE: 16 BRPM | HEART RATE: 60 BPM

## 2018-11-26 DIAGNOSIS — J30.1 SEASONAL ALLERGIC RHINITIS DUE TO POLLEN: ICD-10-CM

## 2018-11-26 DIAGNOSIS — M21.622 TAILOR'S BUNION OF LEFT FOOT: ICD-10-CM

## 2018-11-26 DIAGNOSIS — E78.00 HYPERCHOLESTEROLEMIA: ICD-10-CM

## 2018-11-26 DIAGNOSIS — Z96.652 STATUS POST LEFT KNEE REPLACEMENT: ICD-10-CM

## 2018-11-26 DIAGNOSIS — Z01.818 PREOP GENERAL PHYSICAL EXAM: Primary | ICD-10-CM

## 2018-11-26 LAB
% GRANULOCYTES: 75.8 % (ref 42.2–75.2)
HCT VFR BLD AUTO: 47.8 % (ref 39–51)
HEMOGLOBIN: 15.9 G/DL (ref 13.4–17.5)
LYMPHOCYTES NFR BLD AUTO: 17.9 % (ref 20.5–51.1)
MCH RBC QN AUTO: 29.6 PG (ref 27–31)
MCHC RBC AUTO-ENTMCNC: 33.2 G/DL (ref 33–37)
MCV RBC AUTO: 89.2 FL (ref 80–100)
MONOCYTES NFR BLD AUTO: 6.3 % (ref 1.7–9.3)
PLATELET # BLD AUTO: 277 K/UL (ref 140–450)
RBC # BLD AUTO: 5.36 X10/CMM (ref 4.2–5.9)
WBC # BLD AUTO: 7.8 X10/CMM (ref 3.8–11)

## 2018-11-26 PROCEDURE — 93000 ELECTROCARDIOGRAM COMPLETE: CPT | Performed by: FAMILY MEDICINE

## 2018-11-26 PROCEDURE — 36415 COLL VENOUS BLD VENIPUNCTURE: CPT | Performed by: FAMILY MEDICINE

## 2018-11-26 PROCEDURE — 99215 OFFICE O/P EST HI 40 MIN: CPT | Performed by: FAMILY MEDICINE

## 2018-11-26 PROCEDURE — 85025 COMPLETE CBC W/AUTO DIFF WBC: CPT | Performed by: FAMILY MEDICINE

## 2018-11-26 RX ORDER — CEPHALEXIN 500 MG/1
CAPSULE ORAL
Refills: 0 | COMMUNITY
Start: 2018-11-24 | End: 2019-03-21

## 2018-11-26 RX ORDER — NAPROXEN 500 MG/1
TABLET ORAL
Refills: 2 | COMMUNITY
Start: 2018-11-24 | End: 2019-03-21

## 2018-11-26 RX ORDER — OXYCODONE AND ACETAMINOPHEN 5; 325 MG/1; MG/1
TABLET ORAL
Refills: 0 | COMMUNITY
Start: 2018-11-24 | End: 2019-03-21

## 2018-11-26 ASSESSMENT — PAIN SCALES - GENERAL: PAINLEVEL: MILD PAIN (2)

## 2018-11-26 NOTE — MR AVS SNAPSHOT
After Visit Summary   11/26/2018    Walker Ortiz    MRN: [id number]           Patient Information     Date Of Birth          1950        Visit Information        Provider Department      11/26/2018 9:30 AM Sherwin Woods MD [facility]        Today's Diagnoses     Preop general physical exam    -  1    Sukhjinder bunion of left foot        Status post left knee replacement        Seasonal allergic rhinitis due to pollen        Hypercholesterolemia          Care Instructions      Before Your Surgery      Call your surgeon if there is any change in your health. This includes signs of a cold or flu (such as a sore throat, runny nose, cough, rash or fever).    Do not smoke, drink alcohol or take over the counter medicine (unless your surgeon or primary care doctor tells you to) for the 24 hours before and after surgery.    If you take prescribed drugs: Follow your doctor s orders about which medicines to take and which to stop until after surgery.    Eating and drinking prior to surgery: follow the instructions from your surgeon    Take a shower or bath the night before surgery. Use the soap your surgeon gave you to gently clean your skin. If you do not have soap from your surgeon, use your regular soap. Do not shave or scrub the surgery site.  Wear clean pajamas and have clean sheets on your bed.           Follow-ups after your visit        Who to contact     If you have questions or need follow up information about today's clinic visit or your schedule please contact [facility] directly at [phone].  Normal or non-critical lab and imaging results will be communicated to you by MyChart, letter or phone within 4 business days after the clinic has received the results. If you do not hear from us within 7 days, please contact the clinic through MyChart or phone. If you have a critical or abnormal lab result, we will notify you by phone as soon as possible.  Submit  "refill requests through [facility] or call your pharmacy and they will forward the refill request to us. Please allow 3 business days for your refill to be completed.          Additional Information About Your Visit        [employer] Information     [employer] [facility] lets you send messages to your doctor, view your test results, renew your prescriptions, schedule appointments and more. To sign up, go to [email or web address] . Click on \"Log in\" on the left side of the screen, which will take you to the Welcome page. Then click on \"Sign up Now\" on the right side of the page.     You will be asked to enter the access code listed below, as well as some personal information. Please follow the directions to create your username and password.     Your access code is: [email or web address]  Expires: 2019 12:27 PM     Your access code will  in 90 days. If you need help or a new code, please call your [place] clinic or [phone].        Care EveryWhere ID     This is your Care EveryWhere ID. This could be used by other organizations to access your [place] medical records  [id number]        Your Vitals Were     Pulse Temperature Respirations Height BMI (Body Mass Index)       60 97.5  F (36.4  C) (Oral) 16 1.715 m (5' 7.5\") 33.36 kg/m2        Blood Pressure from Last 3 Encounters:   18 124/80   18 132/86   18 136/90    Weight from Last 3 Encounters:   18 98.1 kg (216 lb 3.2 oz)   18 96.2 kg (212 lb)   18 96.2 kg (212 lb)              We Performed the Following     CBC with Diff/Plt ([facility])     EKG 12-lead complete w/read - Clinics          Today's Medication Changes          These changes are accurate as of 18  9:59 AM.  If you have any questions, ask your nurse or doctor.               Stop taking these medicines if you haven't already. Please contact your care team if you have questions.     amoxicillin-clavulanate 875-125 MG per tablet   Commonly known as:  AUGMENTIN   Stopped by:  " Sherwin Woods MD           benzonatate 200 MG capsule   Commonly known as:  TESSALON   Stopped by:  Sherwin Woods MD                   Information about OPIOIDS     PRESCRIPTION OPIOIDS: WHAT YOU NEED TO KNOW   We gave you an opioid (narcotic) pain medicine. It is important to manage your pain, but opioids are not always the best choice. You should first try all the other options your care team gave you. Take this medicine for as short a time (and as few doses) as possible.    Some activities can increase your pain, such as bandage changes or therapy sessions. It may help to take your pain medicine 30 to 60 minutes before these activities. Reduce your stress by getting enough sleep, working on hobbies you enjoy and practicing relaxation or meditation. Talk to your care team about ways to manage your pain beyond prescription opioids.    These medicines have risks:    DO NOT drive when on new or higher doses of pain medicine. These medicines can affect your alertness and reaction times, and you could be arrested for driving under the influence (DUI). If you need to use opioids long-term, talk to your care team about driving.    DO NOT operate heavy machinery    DO NOT do any other dangerous activities while taking these medicines.    DO NOT drink any alcohol while taking these medicines.     If the opioid prescribed includes acetaminophen, DO NOT take with any other medicines that contain acetaminophen. Read all labels carefully. Look for the word  acetaminophen  or  Tylenol.  Ask your pharmacist if you have questions or are unsure.    You can get addicted to pain medicines, especially if you have a history of addiction (chemical, alcohol or substance dependence). Talk to your care team about ways to reduce this risk.    All opioids tend to cause constipation. Drink plenty of water and eat foods that have a lot of fiber, such as fruits, vegetables, prune juice, apple juice and high-fiber cereal. Take a  laxative (Miralax, milk of magnesia, Colace, Senna) if you don t move your bowels at least every other day. Other side effects include upset stomach, sleepiness, dizziness, throwing up, tolerance (needing more of the medicine to have the same effect), physical dependence and slowed breathing.    Store your pills in a secure place, locked if possible. We will not replace any lost or stolen medicine. If you don t finish your medicine, please throw away (dispose) as directed by your pharmacist. The [place] Pollution Control Agency has more information about safe disposal: https://[email or web address].[facility].[place].us/living-green/managing-unwanted-medications         Primary Care Provider Office Phone # Fax #    Bhavana Mullen [phone] [address]  [facility] [address]        Equal Access to Services     [facility] : Hadii abram olivares hadasho Soomaali, waaxda luqadaha, qaybta kaalmada adeegyada, saadia brandt . So [facility] [phone].    ATENCIÓN: Si habla español, tiene a cheney disposición servicios gratuitos de asistencia lingüística. Llame al [phone].    We comply with applicable federal civil rights laws and [place] laws. We do not discriminate on the basis of race, color, national origin, age, disability, sex, sexual orientation, or gender identity.            Thank you!     Thank you for choosing [facility]  for your care. Our goal is always to provide you with excellent care. Hearing back from our patients is one way we can continue to improve our services. Please take a few minutes to complete the written survey that you may receive in the mail after your visit with us. Thank you!             Your Updated Medication List - Protect others around you: Learn how to safely use, store and throw away your medicines at www.disposemymeds.org.          This list is accurate as of 11/26/18  9:59 AM.  Always use your most recent med list.                   Brand  Name Dispense Instructions for use Diagnosis    BENADRYL PO      Take 25 mg by mouth every 4 hours as needed for allergies        cephALEXin 500 MG capsule    KEFLEX          naproxen 500 MG tablet    NAPROSYN          oxyCODONE-acetaminophen 5-325 MG tablet    PERCOCET          prednisoLONE acetate 1 % ophthalmic susp    PRED FORTE     Place 1 drop Into the left eye At Bedtime        senna-docusate 8.6-50 MG per tablet    SENOKOT-S;PERICOLACE    60 tablet    Take 1-2 tablets by mouth 2 times daily as needed for constipation    Right knee pain, unspecified chronicity       simvastatin 10 MG tablet    ZOCOR    90 tablet    Take 1 tablet (10 mg) by mouth At Bedtime    Hyperlipidemia LDL goal <130       vardenafil 10 MG tablet    LEVITRA    2 tablet    Take 1 tablet (10 mg) by mouth daily as needed 60 min prior to sex. Do not use with nitroglycerin, terazosin or doxazosin.    Erectile dysfunction, unspecified erectile dysfunction type

## 2018-11-26 NOTE — PROGRESS NOTES
[facility]  [address] [place] [id number] [phone]  Dept: [phone]    PRE-OP EVALUATION:  Today's date: 2018    Walker Ortiz (: 1950) presents for pre-operative evaluation assessment as requested by Dr. Chidi Kennedy.  He requires evaluation and anesthesia risk assessment prior to undergoing surgery/procedure for treatment of left foot - Tailor's bunion, regular bunion, callous .    Proposed Surgery/ Procedure: removal of bunions & callous  Date of Surgery/ Procedure: 18  Time of Surgery/ Procedure: 0915  Hospital/Surgical Facility: [facility]  Fax number for surgical facility: [phone]  Primary Physician: Bhavana Mullen  Type of Anesthesia Anticipated: to be determined    Patient has a Health Care Directive or Living Will:  YES form given today    1. NO - Do you have a history of heart attack, stroke, stent, bypass or surgery on an artery in the head, neck, heart or legs?  2. NO - Do you ever have any pain or discomfort in your chest?  3. NO - Do you have a history of  Heart Failure?  4. NO - Are you troubled by shortness of breath when: walking on the level, up a slight hill or at night?  5. NO - Do you currently have a cold, bronchitis or other respiratory infection?  6. NO - Do you have a cough, shortness of breath or wheezing?  7. NO - Do you sometimes get pains in the calves of your legs when you walk?  8. NO - Do you or anyone in your family have previous history of blood clots?  9. NO - Do you or does anyone in your family have a serious bleeding problem such as prolonged bleeding following surgeries or cuts?  10. NO - Have you ever had problems with anemia or been told to take iron pills?  11. NO - Have you had any abnormal blood loss such as black, tarry or bloody stools, or abnormal vaginal bleeding?  12. NO - Have you ever had a blood transfusion?  13. NO - Have you or any of your relatives ever had problems with  anesthesia?  14. YES - DO YOU HAVE SLEEP APNEA, EXCESSIVE SNORING OR DAYTIME DROWSINESS? Excessive snoring  15. NO - Do you have any prosthetic heart valves?  16. YES - Do you have prosthetic joints? Both knees  17. NO - Is there any chance that you may be pregnant?      HPI:     HPI related to upcoming procedure: Bunion Repair on the left foot      See problem list for active medical problems.  Problems all longstanding and stable, except as noted/documented.  See ROS for pertinent symptoms related to these conditions.                                                                                                                                                          .    MEDICAL HISTORY:     Patient Active Problem List    Diagnosis Date Noted     Right knee pain, unspecified chronicity 09/25/2017     Priority: Medium     Aftercare following right knee joint replacement surgery 09/25/2017     Priority: Medium     Advanced directives, counseling/discussion 10/01/2015     Priority: Medium     Advanced Care Planning 10/1/2015: Advanced Care Directive given to the patient today with options/directions on how to complete the packet and return to the clinic for scanning.  Francesca Muñoz CMA  12:45 PM October 1, 2015            Hypercholesterolemia 10/01/2015     Priority: Medium     Health Care Home 08/19/2013     Priority: Medium     Diverticulosis 08/19/2013     Priority: Medium     Cataract associated with another disorder 08/19/2013     Priority: Medium     Allergic rhinitis      Priority: Medium     Problem list name updated by automated process. Provider to review       Knee joint replacement status 09/06/2012     Priority: Medium     CARDIOVASCULAR SCREENING; LDL GOAL LESS THAN 160 10/31/2010     Priority: Medium      Past Medical History:   Diagnosis Date     Allergic rhinitis, cause unspecified      Calculus of kidney     ca oxalate     Diverticulitis of colon (without mention of hemorrhage)(562.11)       Keratoconus of left eye      Past Surgical History:   Procedure Laterality Date     ADJUST SUTURE EYE POST PROCEDURE  9/20/2013    Procedure: ADJUST SUTURE EYE POST PROCEDURE;  LEFT EYE PLACE CORNEAL SUTURES;  Surgeon: Tutu Coe MD;  Location: [facility]     APPENDECTOMY      incidential during colon surgery     ARTHROPLASTY KNEE  9/6/2012    Procedure: ARTHROPLASTY KNEE;  LEFT TOTAL KNEE ARTHROPLASTY (BIOMET)^;  Surgeon: Dominik Ramirez MD;  Location:  OR     ARTHROPLASTY KNEE Right 9/18/2017    Procedure: ARTHROPLASTY KNEE;  RIGHT TOTAL KNEE ARTHROPLASTY ;  Surgeon: Domniik Ramirez MD;  Location:  OR     C NONSPECIFIC PROCEDURE      ulnar nerve resection - R     C NONSPECIFIC PROCEDURE      B/L arthroscopy for cartilage     C NONSPECIFIC PROCEDURE  6/1992    meniscectomy on L     C NONSPECIFIC PROCEDURE  11/1979    R wrist ganglion cyst removed     EYE SURGERY  5/2012    cornea transplant     PHACOEMULSIFICATION CLEAR CORNEA WITH TORIC INTRAOCULAR LENS IMPLANT  9/17/2013    Procedure: PHACOEMULSIFICATION CLEAR CORNEA WITH TORIC INTRAOCULAR LENS IMPLANT;  LEFT PHACOEMULSIFICATION CLEAR CORNEA WITH TORIC INTRAOCULAR LENS IMPLANT ;  Surgeon: Tutu Coe MD;  Location: [facility]     SIGMOIDECTOMY  1995    for diverticulitis     Current Outpatient Prescriptions   Medication Sig Dispense Refill     DiphenhydrAMINE HCl (BENADRYL PO) Take 25 mg by mouth every 4 hours as needed for allergies       prednisoLONE acetate (PRED FORTE) 1 % ophthalmic suspension Place 1 drop Into the left eye At Bedtime   5     simvastatin (ZOCOR) 10 MG tablet Take 1 tablet (10 mg) by mouth At Bedtime 90 tablet 3     vardenafil (LEVITRA) 10 MG tablet Take 1 tablet (10 mg) by mouth daily as needed 60 min prior to sex. Do not use with nitroglycerin, terazosin or doxazosin. 2 tablet 0     cephALEXin (KEFLEX) 500 MG capsule   0     naproxen (NAPROSYN) 500 MG tablet   2     oxyCODONE-acetaminophen (PERCOCET) 5-325  "MG tablet   0     senna-docusate (SENOKOT-S;PERICOLACE) 8.6-50 MG per tablet Take 1-2 tablets by mouth 2 times daily as needed for constipation (Patient not taking: Reported on 11/26/2018) 60 tablet 0     OTC products: None, except as noted above    Allergies   Allergen Reactions     No Known Drug Allergies       Latex Allergy: NO    Social History   Substance Use Topics     Smoking status: Never Smoker     Smokeless tobacco: Never Used     Alcohol use Yes      Comment: 1 a month     History   Drug Use No       REVIEW OF SYSTEMS:   Constitutional, neuro, ENT, endocrine, pulmonary, cardiac, gastrointestinal, genitourinary, musculoskeletal, integument and psychiatric systems are negative, except as otherwise noted.    EXAM:   /80  Pulse 60  Temp 97.5  F (36.4  C) (Oral)  Resp 16  Ht 1.715 m (5' 7.5\")  Wt 98.1 kg (216 lb 3.2 oz)  BMI 33.36 kg/m2    GENERAL APPEARANCE: healthy, alert and no distress     EYES: EOMI,  PERRL     HENT: ear canals and TM's normal and nose and mouth without ulcers or lesions     NECK: no adenopathy, no asymmetry, masses, or scars and thyroid normal to palpation     RESP: lungs clear to auscultation - no rales, rhonchi or wheezes     CV: regular rates and rhythm, normal S1 S2, no S3 or S4 and no murmur, click or rub     ABDOMEN:  soft, nontender, no HSM or masses and bowel sounds normal     MS: hammer toe - mallet toe deformity and bunions     SKIN: no suspicious lesions or rashes     NEURO: Normal strength and tone, sensory exam grossly normal, mentation intact and speech normal     PSYCH: mentation appears normal. and affect normal/bright     LYMPHATICS: No cervical adenopathy    DIAGNOSTICS:     EKG: appears normal, NSR, normal axis, normal intervals, no acute ST/T changes c/w ischemia, no LVH by voltage criteria, unchanged from previous tracings  Labs Resulted Today:   Results for orders placed or performed in visit on 11/26/18   CBC with Diff/Plt ([facility])   Result Value Ref Range "    WBC x10/cmm 7.8 3.8 - 11.0 x10/cmm    % Lymphocytes 17.9 (A) 20.5 - 51.1 %    % Monocytes 6.3 1.7 - 9.3 %    % Granulocytes 75.8 (A) 42.2 - 75.2 %    RBC x10/cmm 5.36 4.2 - 5.9 x10/cmm    Hemoglobin 15.9 13.4 - 17.5 g/dl    Hematocrit 47.8 39 - 51 %    MCV 89.2 80 - 100 fL    MCH 29.6 27.0 - 31.0 pg    MCHC 33.2 33.0 - 37.0 g/dL    Platelet Count 277 140 - 450 K/uL       Recent Labs   Lab Test  09/21/17   0656  09/20/17   0644  09/19/17   0725  09/18/17   1125  09/11/17   1540   HGB   --   13.2*  13.6  15.3   --    PLT  215   --    --   238   --    NA   --    --    --   140  156*   POTASSIUM   --    --    --   3.9  4.4   CR  1.00   --    --   0.92  1.39*        IMPRESSION:   Reason for surgery/procedure: medial and lateral bunions    The proposed surgical procedure is considered LOW risk.    REVISED CARDIAC RISK INDEX  The patient has the following serious cardiovascular risks for perioperative complications such as (MI, PE, VFib and 3  AV Block):  No serious cardiac risks  INTERPRETATION: 0 risks: Class I (very low risk - 0.4% complication rate)    The patient has the following additional risks for perioperative complications:  No identified additional risks      ICD-10-CM    1. Preop general physical exam Z01.818 CBC with Diff/Plt ([facility])     EKG 12-lead complete w/read - Clinics   2. Tailor's bunion of left foot M21.622 CBC with Diff/Plt ([facility])     EKG 12-lead complete w/read - Clinics   3. Status post left knee replacement Z96.652    4. Seasonal allergic rhinitis due to pollen J30.1    5. Hypercholesterolemia E78.00        RECOMMENDATIONS:         --Patient is to take all scheduled medications on the day of surgery EXCEPT for modifications listed below.    APPROVAL GIVEN to proceed with proposed procedure, without further diagnostic evaluation       Signed Electronically by: Sherwin Woods MD    Copy of this evaluation report is provided to requesting physician.    Be Preop Guidelines    Revised  Cardiac Risk Index

## 2018-12-12 NOTE — PROGRESS NOTES
11/26/18 We have faxed this preop and EKG results to [facility] sameday surgery @ [phone]    Tomas

## 2018-12-12 NOTE — PROGRESS NOTES
11/27/18 faxed this preop and EKG results with labs results from 11/26/18 to [facility] same day surgery @ [phone]    Tomas Burrell,   [facility]  [phone]

## 2019-03-21 ENCOUNTER — OFFICE VISIT (OUTPATIENT)
Dept: FAMILY MEDICINE | Facility: CLINIC | Age: 69
End: 2019-03-21

## 2019-03-21 VITALS
BODY MASS INDEX: 33.04 KG/M2 | SYSTOLIC BLOOD PRESSURE: 139 MMHG | WEIGHT: 218 LBS | HEIGHT: 68 IN | DIASTOLIC BLOOD PRESSURE: 88 MMHG | RESPIRATION RATE: 16 BRPM | OXYGEN SATURATION: 96 % | HEART RATE: 61 BPM

## 2019-03-21 DIAGNOSIS — Z23 NEED FOR VACCINATION: ICD-10-CM

## 2019-03-21 DIAGNOSIS — E78.5 HYPERLIPIDEMIA LDL GOAL <130: ICD-10-CM

## 2019-03-21 DIAGNOSIS — Z00.00 PREVENTATIVE HEALTH CARE: Primary | ICD-10-CM

## 2019-03-21 DIAGNOSIS — Z90.49 H/O PARTIAL RESECTION OF COLON: ICD-10-CM

## 2019-03-21 DIAGNOSIS — Z12.11 SPECIAL SCREENING FOR MALIGNANT NEOPLASMS, COLON: ICD-10-CM

## 2019-03-21 PROBLEM — Z47.1 AFTERCARE FOLLOWING RIGHT KNEE JOINT REPLACEMENT SURGERY: Status: RESOLVED | Noted: 2017-09-25 | Resolved: 2019-03-21

## 2019-03-21 PROBLEM — Z96.651 AFTERCARE FOLLOWING RIGHT KNEE JOINT REPLACEMENT SURGERY: Status: RESOLVED | Noted: 2017-09-25 | Resolved: 2019-03-21

## 2019-03-21 PROCEDURE — 84443 ASSAY THYROID STIM HORMONE: CPT | Mod: 90 | Performed by: FAMILY MEDICINE

## 2019-03-21 PROCEDURE — 99397 PER PM REEVAL EST PAT 65+ YR: CPT | Mod: 25 | Performed by: FAMILY MEDICINE

## 2019-03-21 PROCEDURE — G0009 ADMIN PNEUMOCOCCAL VACCINE: HCPCS | Performed by: FAMILY MEDICINE

## 2019-03-21 PROCEDURE — 36415 COLL VENOUS BLD VENIPUNCTURE: CPT | Performed by: FAMILY MEDICINE

## 2019-03-21 PROCEDURE — 90732 PPSV23 VACC 2 YRS+ SUBQ/IM: CPT | Performed by: FAMILY MEDICINE

## 2019-03-21 PROCEDURE — 80053 COMPREHEN METABOLIC PANEL: CPT | Mod: 90 | Performed by: FAMILY MEDICINE

## 2019-03-21 RX ORDER — SIMVASTATIN 10 MG
10 TABLET ORAL AT BEDTIME
Qty: 90 TABLET | Refills: 3 | Status: SHIPPED | OUTPATIENT
Start: 2019-03-21 | End: 2020-04-14

## 2019-03-21 ASSESSMENT — MIFFLIN-ST. JEOR: SCORE: 1730.4

## 2019-03-21 NOTE — PROGRESS NOTES
SUBJECTIVE:   CC: Walker Ortiz is an 68 year old male who presents for preventive health visit.     Healthy Habits:    Do you get at least three servings of calcium containing foods daily (dairy, green leafy vegetables, etc.)? yes    Amount of exercise or daily activities, outside of work: 3 day(s) per week    Problems taking medications regularly No    Medication side effects: No    Have you had an eye exam in the past two years? yes    Do you see a dentist twice per year? yes    Do you have sleep apnea, excessive snoring or daytime drowsiness?yes    PROBLEMS TO ADD ON...    Today's PHQ-2 Score:   PHQ-2 ( 1999 [employer]) 11/26/2018 10/1/2015   Q1: Little interest or pleasure in doing things 0 0   Q2: Feeling down, depressed or hopeless 0 0   PHQ-2 Score 0 0     Abuse: Current or Past(Physical, Sexual or Emotional)- No  Do you feel safe in your environment? Yes    Social History     Tobacco Use     Smoking status: Never Smoker     Smokeless tobacco: Never Used   Substance Use Topics     Alcohol use: Yes     Comment: 1 a month     If you drink alcohol do you typically have >3 drinks per day or >7 drinks per week? Yes - AUDIT SCORE:              Last PSA:   PSA   Date Value Ref Range Status   06/02/2006 0.58 0 - 4 ug/L Final     Reviewed orders with patient. Reviewed health maintenance and updated orders accordingly - Yes  Labs reviewed in EPIC    Reviewed and updated as needed this visit by clinical staff       Reviewed and updated as needed this visit by Provider        ROS:  CONSTITUTIONAL: NEGATIVE for fever, chills, change in weight  INTEGUMENTARY/SKIN: NEGATIVE for worrisome rashes, moles or lesions  EYES: NEGATIVE for vision changes or irritation  ENT: NEGATIVE for ear, mouth and throat problems  RESP: NEGATIVE for significant cough or SOB  CV: NEGATIVE for chest pain, palpitations or peripheral edema  GI: diarrhea with certain foods, s/p sigmoidectomy   male: negative for dysuria, hematuria, decreased  "urinary stream, erectile dysfunction, urethral discharge  MUSCULOSKELETAL: NEGATIVE for significant arthralgias or myalgia  NEURO: NEGATIVE for weakness, dizziness or paresthesias  PSYCHIATRIC: NEGATIVE for changes in mood or affect    OBJECTIVE:   /88   Pulse 61   Resp 16   Ht 1.715 m (5' 7.5\")   Wt 98.9 kg (218 lb)   SpO2 96%   BMI 33.64 kg/m    EXAM:  GENERAL: alert, no distress and over weight  EYES: Eyes grossly normal to inspection, PERRL and conjunctivae and sclerae normal  HENT: ear canals and TM's normal, nose and mouth without ulcers or lesions  NECK: no adenopathy, no asymmetry, masses, or scars and thyroid normal to palpation  RESP: lungs clear to auscultation - no rales, rhonchi or wheezes  CV: regular rate and rhythm, normal S1 S2, no S3 or S4, no murmur, click or rub, no peripheral edema and peripheral pulses strong  ABDOMEN: soft, nontender, no hepatosplenomegaly, no masses and bowel sounds normal  MS: no gross musculoskeletal defects noted, no edema  SKIN: no suspicious lesions or rashes  NEURO: Normal strength and tone, mentation intact and speech normal  PSYCH: mentation appears normal, affect normal/bright    Diagnostic Test Results:  none     ASSESSMENT/PLAN:       ICD-10-CM    1. Preventative health care Z00.00 Comp. Metabolic Panel (14) ([facility])     TSH ([facility])     VENOUS COLLECTION   2. H/O partial resection of colon Z90.49 GASTROENTEROLOGY ADULT REF CONSULT ONLY     VENOUS COLLECTION   3. Special screening for malignant neoplasms, colon Z12.11 GASTROENTEROLOGY ADULT REF CONSULT ONLY     VENOUS COLLECTION   4. Hyperlipidemia LDL goal <130 E78.5 simvastatin (ZOCOR) 10 MG tablet     Comp. Metabolic Panel (14) ([facility])     Lipid Panel ([facility])     VENOUS COLLECTION     CANCELED: TSH ([facility])     CANCELED: Lipid Panel ([facility])     CANCELED: Comp. Metabolic Panel (14) ([facility])   5. Need for vaccination Z23 Pneumococcal vaccine 23 valent PPSV23  (Pneumovax) [88461]     ADMIN: " "Vaccine, Initial (93583)       COUNSELING:  Reviewed preventive health counseling, as reflected in patient instructions  Special attention given to:        Regular exercise       Healthy diet/nutrition       Vision screening       Hearing screening    BP Readings from Last 1 Encounters:   03/21/19 139/88     Estimated body mass index is 33.36 kg/m  as calculated from the following:    Height as of 11/26/18: 1.715 m (5' 7.5\").    Weight as of 11/26/18: 98.1 kg (216 lb 3.2 oz).    Weight management plan: Discussed healthy diet and exercise guidelines     reports that  has never smoked. he has never used smokeless tobacco.      Will have see GI to evaluate diarrhea and needs scope anyway    Counseling Resources:  ATP IV Guidelines  Pooled Cohorts Equation Calculator  FRAX Risk Assessment  ICSI Preventive Guidelines  Dietary Guidelines for Americans, 2010  USDA's MyPlate  ASA Prophylaxis  Lung CA Screening    Zion Layne MD  [facility]  "

## 2019-03-21 NOTE — PATIENT INSTRUCTIONS
Preventive Health Recommendations:     See your health care provider every year to    Review health changes.     Discuss preventive care.      Review your medicines if your doctor has prescribed any.      Talk with your health care provider about whether you should have a test to screen for prostate cancer (PSA).    Every 3 years, have a diabetes test (fasting glucose). If you are at risk for diabetes, you should have this test more often.    Every 5 years, have a cholesterol test. Have this test more often if you are at risk for high cholesterol or heart disease.     Every 10 years, have a colonoscopy. Or, have a yearly FIT test (stool test). These exams will check for colon cancer.    Talk to with your health care provider about screening for Abdominal Aortic Aneurysm if you have a family history of AAA or have a history of smoking.    Shots:     Get a flu shot each year.     Get a tetanus shot every 10 years.     Talk to your doctor about your pneumonia vaccines. There are now two you should receive - Pneumovax (PPSV 23) and Prevnar (PCV 13).     Talk to your pharmacist about a shingles vaccine.     Talk to your doctor about the hepatitis B vaccine.  Nutrition:     Eat at least 5 servings of fruits and vegetables each day.     Eat whole-grain bread, whole-wheat pasta and brown rice instead of white grains and rice.     Get adequate Calcium and Vitamin D.   Lifestyle    Exercise for at least 150 minutes a week (30 minutes a day, 5 days a week). This will help you control your weight and prevent disease.     Limit alcohol to one drink per day.     No smoking.     Wear sunscreen to prevent skin cancer.    See your dentist every six months for an exam and cleaning.    See your eye doctor every 1 to 2 years to screen for conditions such as glaucoma, macular degeneration, cataracts, etc.    Personalized Prevention Plan  You are due for the preventive services outlined below.  Your care team is available to assist you  in scheduling these services.  If you have already completed any of these items, please share that information with your care team to update in your medical record.  Health Maintenance Due   Topic Date Due     Zoster (Shingles) Vaccine (1 of 2) 08/10/2001     AORTIC ANEURYSM SCREENING (SYSTEM ASSIGNED)  08/10/2016     Annual Wellness Visit  10/01/2016     Colonoscopy - ever 10 years  01/24/2018     Pneumococcal Vaccine (2 of 2 - PPSV23) 08/19/2018     Prostate Test (PSA) - yearly  09/11/2018

## 2019-03-22 LAB
ALBUMIN SERPL-MCNC: 4.7 G/DL (ref 3.6–4.8)
ALBUMIN/GLOB SERPL: 1.7 {RATIO} (ref 1.2–2.2)
ALP SERPL-CCNC: 88 IU/L (ref 39–117)
ALT SERPL-CCNC: 27 IU/L (ref 0–44)
AST SERPL-CCNC: 22 IU/L (ref 0–40)
BILIRUB SERPL-MCNC: 0.9 MG/DL (ref 0–1.2)
BUN SERPL-MCNC: 18 MG/DL (ref 8–27)
BUN/CREATININE RATIO: 16 (ref 10–24)
CALCIUM SERPL-MCNC: 9.8 MG/DL (ref 8.6–10.2)
CHLORIDE SERPLBLD-SCNC: 104 MMOL/L (ref 96–106)
CREAT SERPL-MCNC: 1.16 MG/DL (ref 0.76–1.27)
EGFR IF AFRICN AM: 75 ML/MIN/1.73
EGFR IF NONAFRICN AM: 65 ML/MIN/1.73
GLOBULIN, TOTAL: 2.7 G/DL (ref 1.5–4.5)
GLUCOSE SERPL-MCNC: 100 MG/DL (ref 65–99)
POTASSIUM SERPL-SCNC: 5.1 MMOL/L (ref 3.5–5.2)
PROT SERPL-MCNC: 7.4 G/DL (ref 6–8.5)
SODIUM SERPL-SCNC: 139 MMOL/L (ref 134–144)
TOTAL CO2: 25 MMOL/L (ref 20–29)
TSH BLD-ACNC: 3.84 UIU/ML (ref 0.45–4.5)

## 2019-03-27 LAB
CHOLEST SERPL-MCNC: 160 MG/DL (ref 100–199)
HDLC SERPL-MCNC: 37 MG/DL
LDL/HDL RATIO: 2.4 RATIO (ref 0–3.6)
LDLC SERPL CALC-MCNC: 89 MG/DL (ref 0–99)
SPECIMEN STATUS REPORT: NORMAL
TRIGL SERPL-MCNC: 169 MG/DL (ref 0–149)
VLDLC SERPL CALC-MCNC: 34 MG/DL (ref 5–40)

## 2019-04-03 ENCOUNTER — TRANSFERRED RECORDS (OUTPATIENT)
Dept: FAMILY MEDICINE | Facility: CLINIC | Age: 69
End: 2019-04-03

## 2019-04-19 NOTE — PROGRESS NOTES
Order(s) created erroneously. Erroneous order ID: [id number]   Order canceled by: ELODIA CORRALES   Order cancel date/time: 04/19/2019 8:19 AM

## 2019-04-23 ENCOUNTER — OFFICE VISIT (OUTPATIENT)
Dept: FAMILY MEDICINE | Facility: CLINIC | Age: 69
End: 2019-04-23

## 2019-04-23 VITALS
DIASTOLIC BLOOD PRESSURE: 80 MMHG | HEART RATE: 91 BPM | SYSTOLIC BLOOD PRESSURE: 136 MMHG | TEMPERATURE: 98.7 F | RESPIRATION RATE: 16 BRPM | OXYGEN SATURATION: 95 % | BODY MASS INDEX: 32.93 KG/M2 | WEIGHT: 213.4 LBS

## 2019-04-23 DIAGNOSIS — J01.10 ACUTE NON-RECURRENT FRONTAL SINUSITIS: ICD-10-CM

## 2019-04-23 DIAGNOSIS — R05.9 COUGH: Primary | ICD-10-CM

## 2019-04-23 DIAGNOSIS — R09.1 PLEURISY: ICD-10-CM

## 2019-04-23 LAB
% GRANULOCYTES: 79.4 % (ref 42.2–75.2)
HCT VFR BLD AUTO: 46.9 % (ref 39–51)
HEMOGLOBIN: 15.5 G/DL (ref 13.4–17.5)
LYMPHOCYTES NFR BLD AUTO: 16.3 % (ref 20.5–51.1)
MCH RBC QN AUTO: 29.6 PG (ref 27–31)
MCHC RBC AUTO-ENTMCNC: 33.1 G/DL (ref 33–37)
MCV RBC AUTO: 89.6 FL (ref 80–100)
MONOCYTES NFR BLD AUTO: 4.3 % (ref 1.7–9.3)
PLATELET # BLD AUTO: 186 K/UL (ref 140–450)
RBC # BLD AUTO: 5.23 X10/CMM (ref 4.2–5.9)
WBC # BLD AUTO: 4.6 X10/CMM (ref 3.8–11)

## 2019-04-23 PROCEDURE — 71046 X-RAY EXAM CHEST 2 VIEWS: CPT | Performed by: FAMILY MEDICINE

## 2019-04-23 PROCEDURE — 85025 COMPLETE CBC W/AUTO DIFF WBC: CPT | Performed by: FAMILY MEDICINE

## 2019-04-23 PROCEDURE — 99213 OFFICE O/P EST LOW 20 MIN: CPT | Performed by: FAMILY MEDICINE

## 2019-04-23 PROCEDURE — 36415 COLL VENOUS BLD VENIPUNCTURE: CPT | Performed by: FAMILY MEDICINE

## 2019-04-23 NOTE — PATIENT INSTRUCTIONS
Recommend Ibuprofen (Motrin or Advil) 600 mg (3 pills) every 6-8 hours to help with the chest tightness. Always take this with some food and a full glass of water.    Mucinex can help to loosen phlegm and make it easier to move out.    If failure to improve with this, you may fill Rx for Augmentin.

## 2019-04-23 NOTE — PROGRESS NOTES
Problem(s) Oriented visit        SUBJECTIVE:                                                    Walker Ortiz is a 68 year old male who presents to clinic today for sudden onset last week of cough, sneeze, fever, and body aches. He is mildly short of breath. He is coughing with no color to the sputum, but it feels like there is something deeper that needs to move out. No history of smoking. No asthma. . He has a bad frontal headache. Fever continues low grade.      Problem list, Medication list, Allergies, and Medical/Social/Surgical histories reviewed in [facility] and updated as appropriate.   Additional history: as documented    ROS:  5 point ROS completed and negative except noted above, including Gen, CV, Resp, GI, MS      Histories:   Patient Active Problem List   Diagnosis     Knee joint replacement status     Health Care Home     Allergic rhinitis     Diverticulosis     Cataract associated with another disorder     Advanced directives, counseling/discussion     Right knee pain, unspecified chronicity     H/O partial resection of colon     Hyperlipidemia LDL goal <130     Past Surgical History:   Procedure Laterality Date     ADJUST SUTURE EYE POST PROCEDURE  9/20/2013    Procedure: ADJUST SUTURE EYE POST PROCEDURE;  LEFT EYE PLACE CORNEAL SUTURES;  Surgeon: Tutu Coe MD;  [facility]     APPENDECTOMY      incidential during colon surgery     ARTHROPLASTY KNEE  9/6/2012    Procedure: ARTHROPLASTY KNEE;  LEFT TOTAL KNEE ARTHROPLASTY (BIOMET)^;  Surgeon: Dominik Ramirez MD;  Location:  OR     ARTHROPLASTY KNEE Right 9/18/2017    Procedure: ARTHROPLASTY KNEE;  RIGHT TOTAL KNEE ARTHROPLASTY ;  Surgeon: Dominik Ramirez MD;  Location:  OR     C NONSPECIFIC PROCEDURE      ulnar nerve resection - R     C NONSPECIFIC PROCEDURE      B/L arthroscopy for cartilage     C NONSPECIFIC PROCEDURE  6/1992    meniscectomy on L     C NONSPECIFIC PROCEDURE  11/1979    R wrist ganglion cyst  removed     EYE SURGERY  5/2012    cornea transplant     PHACOEMULSIFICATION CLEAR CORNEA WITH TORIC INTRAOCULAR LENS IMPLANT  9/17/2013    Procedure: PHACOEMULSIFICATION CLEAR CORNEA WITH TORIC INTRAOCULAR LENS IMPLANT;  LEFT PHACOEMULSIFICATION CLEAR CORNEA WITH TORIC INTRAOCULAR LENS IMPLANT ;  Surgeon: Tutu Coe MD;  Location:  [facility]     SIGMOIDECTOMY  1995    for diverticulitis       Social History     Tobacco Use     Smoking status: Never Smoker     Smokeless tobacco: Never Used   Substance Use Topics     Alcohol use: Yes     Comment: 1 a month     Family History   Problem Relation Age of Onset     Cancer Father         d:age 51 lymphoma     Cardiovascular Mother         d:age 41  hx of rheumatic fever     Family History Negative Sister         b:1949     Allergies Sister         b:1950     Family History Negative Brother         b:1952     Family History Negative Brother         b:1954     Allergies Sister         b:1956     Family History Negative Brother         b:1958     Family History Negative Brother         b:1960     Family History Negative Brother         b:1962     Allergies Paternal Grandmother      Diabetes Maternal Grandmother            OBJECTIVE:                                                    /80   Pulse 91   Temp 98.7  F (37.1  C)   Resp 16   Wt 96.8 kg (213 lb 6.4 oz)   SpO2 95%   BMI 32.93 kg/m    Body mass index is 32.93 kg/m .   GENERAL APPEARANCE: Alert, no acute distress  HENT: Ears and TMs normal, oral mucosa and posterior oropharynx normal  NECK: No adenopathy,masses or thyromegaly  RESP: lungs clear to auscultation but with high pitched tight cough  CV: normal rate, regular rhythm, no murmur or gallop  MS: extremities normal, no peripheral edema  NEURO: Alert, oriented, speech and mentation normal  PSYCH: mentation appears normal, affect and mood normal  CXR: no infiltrate   Labs Resulted Today:             WBC x10/cmm 4.6   3.8 - 11.0 x10/cmm Final  04/23/2019 10:11 AM [facility]   % Lymphocytes 16.3  Abnormal   20.5 - 51.1 % Final 04/23/2019 10:11 AM [facility]   % Monocytes 4.3   1.7 - 9.3 % Final 04/23/2019 10:11 AM [facility]   % Granulocytes 79.4  Abnormal   42.2 - 75.2 % Final 04/23/2019 10:11 AM [facility]   RBC x10/cmm 5.23   4.2 - 5.9 x10/cmm Final 04/23/2019 10:11 AM [facility]   Hemoglobin 15.5   13.4 - 17.5 g/dl Final 04/23/2019 10:11 AM [facility]   Hematocrit 46.9   39 - 51 % Final 04/23/2019 10:11 AM [facility]   MCV 89.6   80 - 100 fL Final 04/23/2019 10:11 AM [facility]   MCH 29.6   27.0 - 31.0 pg Final 04/23/2019 10:11 AM [facility]   MCHC 33.1   33.0 - 37.0 g/dL Final 04/23/2019 10:11 AM [facility]   Platelet Count 186   140 - 450 K/uL Final 04/23/2019 10:11 AM [facility]       Results for orders placed or performed in visit on 04/23/19   CBC with Diff/Plt ([facility])   Result Value Ref Range    WBC x10/cmm 4.6 3.8 - 11.0 x10/cmm    % Lymphocytes 16.3 (A) 20.5 - 51.1 %    % Monocytes 4.3 1.7 - 9.3 %    % Granulocytes 79.4 (A) 42.2 - 75.2 %    RBC x10/cmm 5.23 4.2 - 5.9 x10/cmm    Hemoglobin 15.5 13.4 - 17.5 g/dl    Hematocrit 46.9 39 - 51 %    MCV 89.6 80 - 100 fL    MCH 29.6 27.0 - 31.0 pg    MCHC 33.1 33.0 - 37.0 g/dL    Platelet Count 186 140 - 450 K/uL     ASSESSMENT/PLAN:                                                        Walker was seen today for uri.    Diagnoses and all orders for this visit:    Cough  -     X-ray Chest 2 vws*  -     CBC with Diff/Plt ([facility])    Acute non-recurrent frontal sinusitis  -     amoxicillin-clavulanate (AUGMENTIN) 875-125 MG tablet; Take 1 tablet by mouth 2 times daily for 10 days    Pleurisy  See instructions below.      Patient Instructions   Recommend Ibuprofen (Motrin or Advil) 600 mg (3 pills) every 6-8 hours to help with the chest tightness. Always take this with some food and a full glass of water.    Mucinex can help to loosen phlegm and make it easier to move out.    If failure to  improve with this, you may fill Rx for Augmentin.      The following health maintenance items are reviewed in Epic and correct as of today:  Health Maintenance   Topic Date Due     ZOSTER IMMUNIZATION (1 of 2) 08/10/2001     AORTIC ANEURYSM SCREENING (SYSTEM ASSIGNED)  08/10/2016     PSA Q1 YR  09/11/2018     PHQ-2  01/01/2019     FALL RISK ASSESSMENT  11/26/2019     MEDICARE ANNUAL WELLNESS VISIT  03/21/2020     ADVANCE DIRECTIVE PLANNING Q5 YRS  09/11/2022     LIPID SCREEN Q5 YR MALE (SYSTEM ASSIGNED)  03/21/2024     DTAP/TDAP/TD IMMUNIZATION (3 - Td) 12/28/2026     COLONOSCOPY Q10 YR  04/03/2029     INFLUENZA VACCINE  Completed     PNEUMOCOCCAL IMMUNIZATION 65+ LOW/MEDIUM RISK  Completed     HEPATITIS C SCREENING  Completed     IPV IMMUNIZATION  Aged Out     MENINGITIS IMMUNIZATION  Aged Out       Marcie Elena MD  [facility]  Family Practice  [facility]  [phone]    For any issues my office # is [phone]

## 2019-09-09 ENCOUNTER — OFFICE VISIT (OUTPATIENT)
Dept: FAMILY MEDICINE | Facility: CLINIC | Age: 69
End: 2019-09-09

## 2019-09-09 VITALS
HEART RATE: 68 BPM | DIASTOLIC BLOOD PRESSURE: 76 MMHG | RESPIRATION RATE: 16 BRPM | BODY MASS INDEX: 32.1 KG/M2 | WEIGHT: 208 LBS | SYSTOLIC BLOOD PRESSURE: 132 MMHG | OXYGEN SATURATION: 98 %

## 2019-09-09 DIAGNOSIS — Z12.5 SCREENING FOR PROSTATE CANCER: Primary | ICD-10-CM

## 2019-09-09 DIAGNOSIS — S46.001S INJURY OF RIGHT ROTATOR CUFF, SEQUELA: ICD-10-CM

## 2019-09-09 PROCEDURE — 99213 OFFICE O/P EST LOW 20 MIN: CPT | Performed by: FAMILY MEDICINE

## 2019-09-09 PROCEDURE — 36415 COLL VENOUS BLD VENIPUNCTURE: CPT | Performed by: FAMILY MEDICINE

## 2019-09-09 PROCEDURE — 84153 ASSAY OF PSA TOTAL: CPT | Mod: 90 | Performed by: FAMILY MEDICINE

## 2019-09-09 NOTE — LETTER
[facility] [address] [place]  [address]  Phone: [phone]    September 10, 2019      Walker Ortiz  [address] [place] [id number]              Dear Walker,    I am writing to report that your included test results are within expected ranges. I do not suggest that we make any changes at this time.        Sincerely,     Sherwin Woods M.D.    Results for orders placed or performed in visit on 09/09/19   PSA Serum ([facility])   Result Value Ref Range    PSA NG/ML 0.9 0.0 - 4.0 ng/mL    Narrative    Performed at:  01 - [facility] [address], [place], [place]  [id number]  : César Richards MD, Phone:  [phone]

## 2019-09-09 NOTE — PROGRESS NOTES
Right shoulder injured years ago   Throwing as hard as he could and has had slowly worsening pain since.    Hard to hold grandkid    If throws now will hurt for two weeks.    7/10 pain after any activity.  Aleve will help.    Problem(s) Oriented visit      ROS:  General and CV completed and negative except as noted above     HISTORY:   reports that he drinks alcohol.   reports that he has never smoked. He has never used smokeless tobacco.    Past Medical History:   Diagnosis Date     Allergic rhinitis, cause unspecified      Calculus of kidney      Diverticulitis of colon (without mention of hemorrhage)(562.11)      Keratoconus of left eye      Past Surgical History:   Procedure Laterality Date     ADJUST SUTURE EYE POST PROCEDURE  9/20/2013    Procedure: ADJUST SUTURE EYE POST PROCEDURE;  LEFT EYE PLACE CORNEAL SUTURES;  Surgeon: Tutu Coe MD;  Location: [facility]     APPENDECTOMY      incidential during colon surgery     ARTHROPLASTY KNEE  9/6/2012    Procedure: ARTHROPLASTY KNEE;  LEFT TOTAL KNEE ARTHROPLASTY (BIOMET)^;  Surgeon: Dominik Ramirez MD;  Location:  OR     ARTHROPLASTY KNEE Right 9/18/2017    Procedure: ARTHROPLASTY KNEE;  RIGHT TOTAL KNEE ARTHROPLASTY ;  Surgeon: Dominik Ramirez MD;  Location:  OR     C NONSPECIFIC PROCEDURE      ulnar nerve resection - R     C NONSPECIFIC PROCEDURE      B/L arthroscopy for cartilage     C NONSPECIFIC PROCEDURE  6/1992    meniscectomy on L     C NONSPECIFIC PROCEDURE  11/1979    R wrist ganglion cyst removed     EYE SURGERY  5/2012    cornea transplant     PHACOEMULSIFICATION CLEAR CORNEA WITH TORIC INTRAOCULAR LENS IMPLANT  9/17/2013    Procedure: PHACOEMULSIFICATION CLEAR CORNEA WITH TORIC INTRAOCULAR LENS IMPLANT;  LEFT PHACOEMULSIFICATION CLEAR CORNEA WITH TORIC INTRAOCULAR LENS IMPLANT ;  Surgeon: Tutu Coe MD;  [facility]:  [facility]     SIGMOIDECTOMY  1995    for diverticulitis       EXAM:  BP: 132/76   Pulse: 68    Temp:  "Data Unavailable    Wt Readings from Last 2 Encounters:   09/09/19 94.3 kg (208 lb)   04/23/19 96.8 kg (213 lb 6.4 oz)       BMI= Body mass index is 32.1 kg/m .    EXAM:  APPEARANCE: = Relaxed and in no distress  Conj/Eyelids = noninjected and lids and lashes are without inflammation  PERRLA/Irises = Pupils Round Reactive to Light and Irisis without inflammation  Ears/Nose = External structures and Nares have usual shape and form  Ear canals and TM's = Canals are not inflammed and have none or little wax and the drums are not injected and have a light reflex   Lips/Teeth/Gums = No lesions seen, good dentition, and gums seem healthy  Oropharynx = No leukoplakia, No injection to the tissues, Normal Uvula  Neck = No anterior or posterior adenopathy appreciated.  Resp effort = Calm regular breathing  Breath Sounds = Good air movement with no rales or rhonchi in any lung fields  Musculsktl: decreased range of motion decreased rom in the shoulder.  Mood/Affect = Cooperative and interested      Assessment/Plan:  Walker was seen today for musculoskeletal problem.    Diagnoses and all orders for this visit:    Screening for prostate cancer  -     PSA Serum ([facility])    Injury of right rotator cuff, sequela    PT given for instructions.        COUNSELING:   reports that he has never smoked. He has never used smokeless tobacco.    Estimated body mass index is 32.1 kg/m  as calculated from the following:    Height as of 3/21/19: 1.715 m (5' 7.5\").    Weight as of this encounter: 94.3 kg (208 lb).       Appropriate preventive services were discussed with this patient, including applicable screening as appropriate for cardiovascular disease, diabetes, osteopenia/osteoporosis, and glaucoma.  As appropriate for age/gender, discussed screening for colorectal cancer, prostate cancer, breast cancer, and cervical cancer. Checklist reviewing preventive services available has been given to the patient.    Reviewed patients plan of care " and provided an AVS. The Basic Care Plan (routine screening as documented in Health Maintenance) for Walker meets the Care Plan requirement. This Care Plan has been established and reviewed with the  Patient.      The following health maintenance items are reviewed in Epic and correct as of today:  Health Maintenance   Topic Date Due     PSA  06/02/2007     AORTIC ANEURYSM SCREENING (SYSTEM ASSIGNED)  08/10/2016     PHQ-2  01/01/2019     INFLUENZA VACCINE (1) 09/01/2019     FALL RISK ASSESSMENT  11/26/2019     MEDICARE ANNUAL WELLNESS VISIT  03/21/2020     ADVANCE CARE PLANNING  09/11/2022     LIPID  03/21/2024     DTAP/TDAP/TD IMMUNIZATION (3 - Td) 12/28/2026     COLONOSCOPY  04/03/2029     HEPATITIS C SCREENING  Completed     PNEUMOCOCCAL IMMUNIZATION 65+ LOW/MEDIUM RISK  Completed     ZOSTER IMMUNIZATION  Completed     IPV IMMUNIZATION  Aged Out     MENINGITIS IMMUNIZATION  Aged Out       Sherwin Woods  [facility]  For any issues my office # is [phone]

## 2019-09-10 LAB — PSA NG/ML: 0.9 NG/ML (ref 0–4)

## 2019-09-10 NOTE — RESULT ENCOUNTER NOTE
Dear Walker,   I am writing to report that your included test results are within expected ranges. I do not suggest that we make any changes at this time.  Sherwin Woods M.D.

## 2019-12-05 ENCOUNTER — TRANSFERRED RECORDS (OUTPATIENT)
Dept: FAMILY MEDICINE | Facility: CLINIC | Age: 69
End: 2019-12-05

## 2019-12-12 ENCOUNTER — TRANSFERRED RECORDS (OUTPATIENT)
Dept: FAMILY MEDICINE | Facility: CLINIC | Age: 69
End: 2019-12-12

## 2019-12-12 ENCOUNTER — OFFICE VISIT (OUTPATIENT)
Dept: FAMILY MEDICINE | Facility: CLINIC | Age: 69
End: 2019-12-12

## 2019-12-12 VITALS
HEART RATE: 87 BPM | DIASTOLIC BLOOD PRESSURE: 80 MMHG | SYSTOLIC BLOOD PRESSURE: 126 MMHG | BODY MASS INDEX: 31.94 KG/M2 | OXYGEN SATURATION: 92 % | RESPIRATION RATE: 16 BRPM | WEIGHT: 207 LBS

## 2019-12-12 DIAGNOSIS — H92.01 RIGHT EAR PAIN: Primary | ICD-10-CM

## 2019-12-12 PROCEDURE — 99213 OFFICE O/P EST LOW 20 MIN: CPT | Performed by: FAMILY MEDICINE

## 2019-12-12 NOTE — PROGRESS NOTES
Problem(s) Oriented visit        SUBJECTIVE:                                                    Walker Ortiz is a 69 year old male who presents to clinic today for the following health issues :    Ear pain in the right   Getting better     Had an MRI of the shoulder today and has a small tear.    Problem list, Medication list, Allergies, and Medical/Social/Surgical histories reviewed in [facility] and updated as appropriate.   Additional history: as documented    ROS:  General and CV completed and negative except as noted above    Histories:   Patient Active Problem List   Diagnosis     Knee joint replacement status     Health Care Home     Allergic rhinitis     Diverticulosis     Cataract associated with another disorder     Advanced directives, counseling/discussion     Right knee pain, unspecified chronicity     H/O partial resection of colon     Hyperlipidemia LDL goal <130     Past Surgical History:   Procedure Laterality Date     ADJUST SUTURE EYE POST PROCEDURE  9/20/2013    Procedure: ADJUST SUTURE EYE POST PROCEDURE;  LEFT EYE PLACE CORNEAL SUTURES;  Surgeon: Tutu Coe MD;  [facility]:  [facility]     APPENDECTOMY      incidential during colon surgery     ARTHROPLASTY KNEE  9/6/2012    Procedure: ARTHROPLASTY KNEE;  LEFT TOTAL KNEE ARTHROPLASTY (BIOMET)^;  Surgeon: Dominik Ramirez MD;  Location:  OR     ARTHROPLASTY KNEE Right 9/18/2017    Procedure: ARTHROPLASTY KNEE;  RIGHT TOTAL KNEE ARTHROPLASTY ;  Surgeon: Dominik Ramirez MD;  Location:  OR     C NONSPECIFIC PROCEDURE      ulnar nerve resection - R     C NONSPECIFIC PROCEDURE      B/L arthroscopy for cartilage     C NONSPECIFIC PROCEDURE  6/1992    meniscectomy on L     C NONSPECIFIC PROCEDURE  11/1979    R wrist ganglion cyst removed     EYE SURGERY  5/2012    cornea transplant     PHACOEMULSIFICATION CLEAR CORNEA WITH TORIC INTRAOCULAR LENS IMPLANT  9/17/2013    Procedure: PHACOEMULSIFICATION CLEAR CORNEA WITH TORIC  INTRAOCULAR LENS IMPLANT;  LEFT PHACOEMULSIFICATION CLEAR CORNEA WITH TORIC INTRAOCULAR LENS IMPLANT ;  Surgeon: Tutu Coe MD;  Location:  [facility]     SIGMOIDECTOMY  1995    for diverticulitis       Social History     Tobacco Use     Smoking status: Never Smoker     Smokeless tobacco: Never Used   Substance Use Topics     Alcohol use: Yes     Comment: 1 a month     Family History   Problem Relation Age of Onset     Cancer Father         d:age 51 lymphoma     Cardiovascular Mother         d:age 41  hx of rheumatic fever     Family History Negative Sister         b:1949     Allergies Sister         b:1950     Family History Negative Brother         b:1952     Family History Negative Brother         b:1954     Allergies Sister         b:1956     Family History Negative Brother         b:1958     Family History Negative Brother         b:1960     Family History Negative Brother         b:1962     Allergies Paternal Grandmother      Diabetes Maternal Grandmother            OBJECTIVE:                                                    /80   Pulse 87   Resp 16   Wt 93.9 kg (207 lb)   SpO2 92%   BMI 31.94 kg/m    Body mass index is 31.94 kg/m .   APPEARANCE: = Relaxed and in no distress  Conj/Eyelids = noninjected and lids and lashes are without inflammation  PERRLA/Irises = Pupils Round Reactive to Light and Irisis without inflammation  Ears/Nose = External structures and Nares have usual shape and form  Ear canals and TM's = Canals are not inflammed and have none or little wax and the drums are not injected and have a light reflex   Lips/Teeth/Gums = No lesions seen, good dentition, and gums seem healthy  Oropharynx = No leukoplakia, No injection to the tissues, Normal Uvula  Neck = No anterior or posterior adenopathy appreciated.  Resp effort = Calm regular breathing  Breath Sounds = Good air movement with no rales or rhonchi in any lung fields  Mood/Affect = Cooperative and interested      ASSESSMENT/PLAN:                                                        There are no diagnoses linked to this encounter.    Regular exercise  Sinus inflamation    The following health maintenance items are reviewed in Epic and correct as of today:  Health Maintenance   Topic Date Due     PSA  06/02/2007     AORTIC ANEURYSM SCREENING (SYSTEM ASSIGNED)  08/10/2016     PHQ-2  01/01/2019     INFLUENZA VACCINE (1) 09/01/2019     FALL RISK ASSESSMENT  11/26/2019     MEDICARE ANNUAL WELLNESS VISIT  03/21/2020     ADVANCE CARE PLANNING  09/11/2022     LIPID  03/21/2024     DTAP/TDAP/TD IMMUNIZATION (3 - Td) 12/28/2026     COLONOSCOPY  04/03/2029     HEPATITIS C SCREENING  Completed     PNEUMOCOCCAL IMMUNIZATION 65+ LOW/MEDIUM RISK  Completed     ZOSTER IMMUNIZATION  Completed     IPV IMMUNIZATION  Aged Out     MENINGITIS IMMUNIZATION  Aged Out       Sherwin Woods MD  [facility]  Family Practice  [facility]  [phone]    For any issues my office # is [phone]

## 2020-02-12 ENCOUNTER — OFFICE VISIT (OUTPATIENT)
Dept: FAMILY MEDICINE | Facility: CLINIC | Age: 70
End: 2020-02-12

## 2020-02-12 VITALS
BODY MASS INDEX: 32.25 KG/M2 | DIASTOLIC BLOOD PRESSURE: 84 MMHG | RESPIRATION RATE: 16 BRPM | WEIGHT: 209 LBS | HEART RATE: 89 BPM | OXYGEN SATURATION: 96 % | TEMPERATURE: 98 F | SYSTOLIC BLOOD PRESSURE: 136 MMHG

## 2020-02-12 DIAGNOSIS — R05.9 COUGH: Primary | ICD-10-CM

## 2020-02-12 PROCEDURE — 99213 OFFICE O/P EST LOW 20 MIN: CPT | Performed by: NURSE PRACTITIONER

## 2020-02-12 RX ORDER — BENZONATATE 200 MG/1
200 CAPSULE ORAL 3 TIMES DAILY PRN
Qty: 40 CAPSULE | Refills: 1 | Status: SHIPPED | OUTPATIENT
Start: 2020-02-12 | End: 2021-07-20

## 2020-02-12 RX ORDER — CODEINE PHOSPHATE AND GUAIFENESIN 10; 100 MG/5ML; MG/5ML
1-2 SOLUTION ORAL EVERY 4 HOURS PRN
Qty: 236 ML | Refills: 0 | Status: SHIPPED | OUTPATIENT
Start: 2020-02-12 | End: 2021-07-20

## 2020-02-12 NOTE — PATIENT INSTRUCTIONS
Patient Education     Adult Self-Care for Colds    Colds are caused by viruses. They can't be cured with antibiotics. However, you can ease symptoms and support your body's efforts to heal itself. No matter which symptoms you have, be sure to:    Drink plenty of fluids (water or clear soup)    Stop smoking and drinking alcohol    Get plenty of rest  Understand a fever    Take your temperature several times a day. If your fever is 100.4 F (38.0 C) for more than a day, call your healthcare provider.    Relax, lie down. Go to bed if you want. Just get off your feet and rest. Also, drink plenty of fluids to avoid dehydration.    Take acetaminophen or a nonsteroidal anti-inflammatory agent (NSAID), such as ibuprofen.    Treat a troubled nose kindly    Breathe steam or heated humidified air to open blocked nasal passages.  a hot shower or use a vaporizer. Be careful not to get burned by the steam.    Saline nasal sprays and decongestant tablets help open a stuffy nose. Antihistamines can also help, but they can cause side effects such as drowsiness and drying of the eyes, nose, and mouth.    Soothe a sore throat and cough    Gargle every 2 hours with 1/4 teaspoon of salt dissolved in 1/2 cup of warm water. Suck on throat lozenges and cough drops to moisten your throat.    Cough medicines are available but it is unclear how well they actually work.    Take acetaminophen or an NSAID, such as ibuprofen, to ease throat pain    Ease digestive problems    Put fluids back into your body. Take frequent sips of clear liquids such as water or broth. Avoid drinks that have a lot of sugar in them, such as juices and sodas. These can make diarrhea worse. Older children and adults can drink sports drinks.    As your appetite returns, you can resume your normal diet. Ask your healthcare provider if there are any foods you should avoid.    When you first notice symptoms, ask your healthcare provider if antiviral medicines are  appropriate. Antibiotics should not be taken for colds or flu. Also, call your healthcare provider if you have any of the following symptoms or if you aren't feeling better after 7 days:    Shortness of breath    Pain or pressure in the chest or belly (abdomen)    Worsening symptoms, especially after a period of improvement    Fever of 100.4 F  (38.0 C) or higher, or fever that doesn't go down with medicine    Sudden dizziness or confusion    Severe or continued vomiting    Signs of dehydration, including extreme thirst, dark urine, infrequent urination, dry mouth    Spotted, red, or very sore throat  Date Last Reviewed: 12/1/2016 2000-2019 [employer]. [address] [place] [id number]. All rights reserved. This information is not intended as a substitute for professional medical care. Always follow your healthcare professional's instructions.

## 2020-02-12 NOTE — PROGRESS NOTES
Problem(s) Oriented visit        SUBJECTIVE:                                                    Walker Ortiz  is a 69 year old  year old male presenting with a complaint of  Cough.   The  cough non-productive, without wheezing, dyspnea or hemoptysis for 2 weeks.   Quality: hacky  Severity: mild  Associated symptoms: none.  Reports his wife has the same cough as him, they had a cold which has resolved and now just an irritating cough remains       Problem list, Medication list, Allergies, and Medical/Social/Surgical histories reviewed in [facility] and updated as appropriate.   Additional history: as documented    ROS:  5 point ROS completed and negative except noted above, including Gen, CV, Resp, GI, MS    Histories:   Patient Active Problem List   Diagnosis     Knee joint replacement status     Health Care Home     Allergic rhinitis     Diverticulosis     Cataract associated with another disorder     Advanced directives, counseling/discussion     Right knee pain, unspecified chronicity     H/O partial resection of colon     Hyperlipidemia LDL goal <130     Past Surgical History:   Procedure Laterality Date     ADJUST SUTURE EYE POST PROCEDURE  9/20/2013    Procedure: ADJUST SUTURE EYE POST PROCEDURE;  LEFT EYE PLACE CORNEAL SUTURES;  Surgeon: Tutu Coe MD;  Location:  [facility]     APPENDECTOMY      incidential during colon surgery     ARTHROPLASTY KNEE  9/6/2012    Procedure: ARTHROPLASTY KNEE;  LEFT TOTAL KNEE ARTHROPLASTY (BIOMET)^;  Surgeon: Dominik Ramirez MD;  Location:  OR     ARTHROPLASTY KNEE Right 9/18/2017    Procedure: ARTHROPLASTY KNEE;  RIGHT TOTAL KNEE ARTHROPLASTY ;  Surgeon: Dominik Ramirez MD;  Location:  OR     C NONSPECIFIC PROCEDURE      ulnar nerve resection - R     C NONSPECIFIC PROCEDURE      B/L arthroscopy for cartilage     C NONSPECIFIC PROCEDURE  6/1992    meniscectomy on L     C NONSPECIFIC PROCEDURE  11/1979    R wrist ganglion cyst removed     EYE SURGERY   5/2012    cornea transplant     PHACOEMULSIFICATION CLEAR CORNEA WITH TORIC INTRAOCULAR LENS IMPLANT  9/17/2013    Procedure: PHACOEMULSIFICATION CLEAR CORNEA WITH TORIC INTRAOCULAR LENS IMPLANT;  LEFT PHACOEMULSIFICATION CLEAR CORNEA WITH TORIC INTRAOCULAR LENS IMPLANT ;  Surgeon: Tutu Coe MD;  Location:  [facility]     SIGMOIDECTOMY  1995    for diverticulitis       Social History     Tobacco Use     Smoking status: Never Smoker     Smokeless tobacco: Never Used   Substance Use Topics     Alcohol use: Yes     Comment: 1 a month     Family History   Problem Relation Age of Onset     Cancer Father         d:age 51 lymphoma     Cardiovascular Mother         d:age 41  hx of rheumatic fever     Family History Negative Sister         b:1949     Allergies Sister         b:1950     Family History Negative Brother         b:1952     Family History Negative Brother         b:1954     Allergies Sister         b:1956     Family History Negative Brother         b:1958     Family History Negative Brother         b:1960     Family History Negative Brother         b:1962     Allergies Paternal Grandmother      Diabetes Maternal Grandmother            OBJECTIVE:                                                    /84   Pulse 89   Temp 98  F (36.7  C)   Resp 16   Wt 94.8 kg (209 lb)   SpO2 96%   BMI 32.25 kg/m    Body mass index is 32.25 kg/m .   APPEARANCE: = Relaxed and in no distress  Conj/Eyelids = noninjected and lids and lashes are without inflammation  PERRLA/Irises = Pupils Round Reactive to Light and Irisis without inflammation  Ears/Nose = External structures and Nares have usual shape and form  Ear canals and TM's = Canals are not inflammed and have none or little wax and the drums are not injected and have a light reflex   Lips/Teeth/Gums = No lesions seen, good dentition, and gums seem healthy  Oropharynx = No leukoplakia, No injection to the tissues, Normal Uvula  Neck = No anterior or posterior  adenopathy appreciated.  Resp effort = Calm regular breathing  Breath Sounds = Good air movement with no rales or rhonchi in any lung fields  Mood/Affect = Cooperative and interested     ASSESSMENT/PLAN:                                                        Walker was seen today for uri.    Diagnoses and all orders for this visit:    Cough  -     benzonatate (TESSALON) 200 MG capsule; Take 1 capsule (200 mg) by mouth 3 times daily as needed for cough  -     guaiFENesin-codeine (ROBITUSSIN AC) 100-10 MG/5ML solution; Take 5-10 mLs by mouth every 4 hours as needed for cough  Increase fluids and rest. Please call if your cough has not resolved in the next two weeks. Please call or return to clinic for any fever or if your symptoms change or become worse.       ASSESSMENT/PLAN:       The following health maintenance items are reviewed in Epic and correct as of today:  Health Maintenance   Topic Date Due     PSA  06/02/2007     AORTIC ANEURYSM SCREENING (SYSTEM ASSIGNED)  08/10/2016     FALL RISK ASSESSMENT  11/26/2019     PHQ-2  01/01/2020     MEDICARE ANNUAL WELLNESS VISIT  03/21/2020     ADVANCE CARE PLANNING  09/11/2022     LIPID  03/21/2024     DTAP/TDAP/TD IMMUNIZATION (3 - Td) 12/28/2026     COLONOSCOPY  04/03/2029     HEPATITIS C SCREENING  Completed     INFLUENZA VACCINE  Completed     PNEUMOCOCCAL IMMUNIZATION 65+ LOW/MEDIUM RISK  Completed     ZOSTER IMMUNIZATION  Completed     IPV IMMUNIZATION  Aged Out     MENINGITIS IMMUNIZATION  Aged Out       Danielle Desouza NP  [facility]  Family Practice  [facility]  [phone]    For any issues my office # is [phone]

## 2020-04-13 DIAGNOSIS — E78.5 HYPERLIPIDEMIA LDL GOAL <130: ICD-10-CM

## 2020-04-14 RX ORDER — SIMVASTATIN 10 MG
10 TABLET ORAL AT BEDTIME
Qty: 90 TABLET | Refills: 3 | Status: SHIPPED | OUTPATIENT
Start: 2020-04-14 | End: 2021-04-09

## 2020-05-11 NOTE — TELEPHONE ENCOUNTER
patient is going to schedule an appointment in June/july.  Will discuss appointment options at that time    Tomas Burrell,   [facility]  [phone]

## 2021-04-09 DIAGNOSIS — E78.5 HYPERLIPIDEMIA LDL GOAL <130: ICD-10-CM

## 2021-04-09 RX ORDER — SIMVASTATIN 10 MG
TABLET ORAL
Qty: 90 TABLET | Refills: 0 | Status: SHIPPED | OUTPATIENT
Start: 2021-04-09 | End: 2021-07-11

## 2021-07-11 DIAGNOSIS — E78.5 HYPERLIPIDEMIA LDL GOAL <130: ICD-10-CM

## 2021-07-11 RX ORDER — SIMVASTATIN 10 MG
TABLET ORAL
Qty: 90 TABLET | Refills: 0 | Status: SHIPPED | OUTPATIENT
Start: 2021-07-11 | End: 2021-07-13

## 2021-07-12 DIAGNOSIS — E78.5 HYPERLIPIDEMIA LDL GOAL <130: ICD-10-CM

## 2021-07-13 RX ORDER — SIMVASTATIN 10 MG
TABLET ORAL
Qty: 90 TABLET | Refills: 0 | Status: SHIPPED | OUTPATIENT
Start: 2021-07-13 | End: 2022-01-10

## 2021-07-13 NOTE — TELEPHONE ENCOUNTER
simvastatin (ZOCOR) 10 MG    LOV - 2/12/2020    Last labs 3/21/2019    Recent Labs   Lab Test 03/21/19  0900 03/27/18  0843   CHOL 160 155   HDL 37* 42   LDL 89 89   TRIG 169* 119     Patient will make appt soon  Patient baby sits for grandchildren with chronic lung disease

## 2021-07-19 NOTE — PATIENT INSTRUCTIONS
Patient Education   Personalized Prevention Plan  You are due for the preventive services outlined below.  Your care team is available to assist you in scheduling these services.  If you have already completed any of these items, please share that information with your care team to update in your medical record.  Health Maintenance Due   Topic Date Due     Prostate Test  06/02/2007     AORTIC ANEURYSM SCREENING (SYSTEM ASSIGNED)  Never done     FALL RISK ASSESSMENT  11/26/2019     PHQ-2  01/01/2021         Thank you for coming in today! If you receive a survey via My Chart, mail or phone please let us know if there was anything you especially appreciated today or if there is any way we can improve our clinic. We value your input.     Likewise, we are working hard to attend to our digital reputation.    Please consider reviewing our clinic on [employer] and/or [employer] via the following links or QR codes:    [email or web address]                                          We truly appreciate you taking the time to do this.    General Information:    Today you had your appointment with Jonh Vargas MD  My hours are:    Monday 8 AM - 5 PM  Tuesday: 8 AM - 5 PM  Wednesday: 8 AM - 5 PM  Fridays: 8 AM - 5 PM    I am not in the office Thursdays. Therefore, non urgent calls received on Thursday will be addressed when I am back in the office on Friday.    If lab work was done today as part of your evaluation you will generally be contacted via My Chart, mail, or phone with the results within 1-5 days. If there is an alarming result we will contact you by phone. Lab results come back at varying times, I generally wait until all labs are resulted before making comments on results. Please note labs are automatically released to My Chart once available.    If you need refills please contact your pharmacy. They will send a refill request to me  to review. Please allow 3 business days for us to process all refill requests.    Please call or send a medical message with any questions or concerns.    Thank you for choosing [facility].  We truly appreciate you trusting us with your medical care.    Sincerely,    Jonh Vargas MD

## 2021-07-19 NOTE — PROGRESS NOTES
"  SUBJECTIVE:   Walker Ortiz is a 70 year old male who presents for Preventive Visit.      Patient has been advised of split billing requirements and indicates understanding: Yes  Are you in the first 12 months of your Medicare Part B coverage?  No    HLD: stable on medications.  No myalgias or previous transaminitis.  No other concerns.  Tolerating well.      Physical Health:    In general, how would you rate your overall physical health? excellent    Outside of work, how many days during the week do you exercise? none    Outside of work, approximately how many minutes a day do you exercise?not applicable    If you drink alcohol do you typically have >3 drinks per day or >7 drinks per week? No    Do you usually eat at least 4 servings of fruit and vegetables a day, include whole grains & fiber and avoid regularly eating high fat or \"junk\" foods? NO    Do you have any problems taking medications regularly?  No    Do you have any side effects from medications? none    Needs assistance for the following daily activities: no assistance needed    Which of the following safety concerns are present in your home?  none identified     Hearing impairment: No    In the past 6 months, have you been bothered by leaking of urine? no        HEARING FREQUENCY:   Right Ear:     500 Hz :  pass   1000 Hz: pass   2000 Hz: pass   4000 Hz: fail  Left Ear:     500 Hz :  pass   1000 Hz: pass   2000 Hz: fail   4000 Hz: fail  Xochitl Gallo CMA 7/20/2021      Mental Health:    In general, how would you rate your overall mental or emotional health? excellent  PHQ-2 Score:      Do you feel safe in your environment? Yes    Have you ever done Advance Care Planning? (For example, a Health Directive, POLST, or a discussion with a medical provider or your loved ones about your wishes): No, advance care planning information given to patient to review.  Advanced care planning was discussed at today's visit.    Additional concerns to " address?      Fall risk:  Fallen 2 or more times in the past year?: No  Any fall with injury in the past year?: No    Cognitive Screenin) Repeat 3 items (Leader, Season, Table)    2) Clock draw: NORMAL  3) 3 item recall: Recalls 3 objects  Results: 3 items recalled: COGNITIVE IMPAIRMENT LESS LIKELY    Mini-CogTM Copyright MARTY John. Licensed by the author for use in [facility]; reprinted with permission (kei@[facility]). All rights reserved.      Do you have sleep apnea, excessive snoring or daytime drowsiness?: no        -------------------------------------    Reviewed and updated as needed this visit by clinical staff  Tobacco  Allergies  Meds  Problems  Med Hx  Surg Hx  Fam Hx          Reviewed and updated as needed this visit by Provider  Tobacco  Allergies  Meds  Problems  Med Hx  Surg Hx  Fam Hx         Social History     Tobacco Use     Smoking status: Never Smoker     Smokeless tobacco: Never Used   Substance Use Topics     Alcohol use: Yes     Comment: 1 a month                           Current providers sharing in care for this patient include:   Patient Care Team:  Jonh Vargas MD as PCP - General (Family Medicine)  Sherwin Woods MD as Assigned PCP    The following health maintenance items are reviewed in Epic and correct as of today:  Health Maintenance   Topic Date Due     FALL RISK ASSESSMENT  2019     INFLUENZA VACCINE (1) 2021     MEDICARE ANNUAL WELLNESS VISIT  2022     PSA  2022     LIPID  2024     ADVANCE CARE PLANNING  2026     DTAP/TDAP/TD IMMUNIZATION (3 - Td or Tdap) 2026     COLORECTAL CANCER SCREENING  2029     HEPATITIS C SCREENING  Completed     PHQ-2  Completed     Pneumococcal Vaccine: 65+ Years  Completed     ZOSTER IMMUNIZATION  Completed     COVID-19 Vaccine  Completed     IPV IMMUNIZATION  Aged Out     MENINGITIS IMMUNIZATION  Aged Out     HEPATITIS B IMMUNIZATION  Aged Out     AORTIC  "ANEURYSM SCREENING (SYSTEM ASSIGNED)  Discontinued     Lab work is in process      ROS:  Constitutional, HEENT, cardiovascular, pulmonary, GI, , musculoskeletal, neuro, skin, endocrine and psych systems are negative, except as otherwise noted.    OBJECTIVE:   BP (!) 149/93   Pulse 120   Temp 97.9  F (36.6  C)   Resp 18   Ht 1.721 m (5' 7.75\")   Wt 96.6 kg (213 lb)   SpO2 98%   BMI 32.63 kg/m   Estimated body mass index is 32.63 kg/m  as calculated from the following:    Height as of this encounter: 1.721 m (5' 7.75\").    Weight as of this encounter: 96.6 kg (213 lb).  EXAM:   GENERAL: healthy, alert and no distress  EYES: Eyes grossly normal to inspection, PERRL and conjunctivae and sclerae normal  HENT: ear canals and TM's normal, nose and mouth without ulcers or lesions  NECK: no adenopathy, no asymmetry, masses, or scars and thyroid normal to palpation  RESP: lungs clear to auscultation - no rales, rhonchi or wheezes  CV: regular rate and rhythm, normal S1 S2, no S3 or S4, no murmur, click or rub, no peripheral edema and peripheral pulses strong  ABDOMEN: soft, nontender, no hepatosplenomegaly, no masses and bowel sounds normal  RECTAL: patient declined  MS: no gross musculoskeletal defects noted, no edema  SKIN: no suspicious lesions or rashes  NEURO: Normal strength and tone, mentation intact and speech normal  PSYCH: mentation appears normal, affect normal/bright    Diagnostic Test Results:  Labs reviewed in Epic    ASSESSMENT / PLAN:   1. Encounter for Medicare annual wellness exam  - discussed preventative guidelines, healthy diet, exercise and weight management  - VENOUS COLLECTION    2. Hyperlipidemia LDL goal <130  stable/controlled. Cont current medication(s) and treatment  - Lipid Panel ([facility])  - VENOUS COLLECTION    3. Prostate cancer screening  - We had a thorough discussion of the potential harms and benefits of PSA screening and he has elected to proceed with screening after discussing " "current guidelines and evidence  - PSA Total (Reflex To Free) ([facility])  - VENOUS COLLECTION    4. Encounter for screening for metabolic disorder  - Comp. Metabolic Panel (14) ([facility])  - VENOUS COLLECTION    5. Encounter for screening for hematologic disorder  - CBC with Diff/Plt ([facility])  - VENOUS COLLECTION    6. IFG (impaired fasting glucose)  - Hemoglobin A1C ([facility])  - VENOUS COLLECTION    7. Class 1 obesity due to excess calories with serious comorbidity and body mass index (BMI) of 32.0 to 32.9 in adult  - Discussed importance of optimizing diet, exercise and healthy weight        Patient has been advised of split billing requirements and indicates understanding: Yes    COUNSELING:  Reviewed preventive health counseling, as reflected in patient instructions    Estimated body mass index is 32.63 kg/m  as calculated from the following:    Height as of this encounter: 1.721 m (5' 7.75\").    Weight as of this encounter: 96.6 kg (213 lb).    Weight management plan: Discussed healthy diet and exercise guidelines    He reports that he has never smoked. He has never used smokeless tobacco.    Appropriate preventive services were discussed with this patient, including applicable screening as appropriate for cardiovascular disease, diabetes, osteopenia/osteoporosis, and glaucoma.  As appropriate for age/gender, discussed screening for colorectal cancer, prostate cancer, breast cancer, and cervical cancer. Checklist reviewing preventive services available has been given to the patient.    Reviewed patients plan of care and provided an AVS. The Basic Care Plan (routine screening as documented in Health Maintenance) for Walker meets the Care Plan requirement. This Care Plan has been established and reviewed with the Patient.    Counseling Resources:  ATP IV Guidelines  Pooled Cohorts Equation Calculator  Breast Cancer Risk Calculator  BRCA-Related Cancer Risk Assessment: FHS-7 Tool  FRAX Risk Assessment  ICSI Preventive " Guidelines  Dietary Guidelines for Americans, 2010  USDA's MyPlate  ASA Prophylaxis  Lung CA Screening    Jonh Vargas MD  [facility]

## 2021-07-20 ENCOUNTER — OFFICE VISIT (OUTPATIENT)
Dept: FAMILY MEDICINE | Facility: CLINIC | Age: 71
End: 2021-07-20

## 2021-07-20 VITALS
TEMPERATURE: 97.9 F | OXYGEN SATURATION: 98 % | SYSTOLIC BLOOD PRESSURE: 132 MMHG | DIASTOLIC BLOOD PRESSURE: 84 MMHG | HEIGHT: 68 IN | RESPIRATION RATE: 18 BRPM | WEIGHT: 213 LBS | HEART RATE: 120 BPM | BODY MASS INDEX: 32.28 KG/M2

## 2021-07-20 DIAGNOSIS — E78.5 HYPERLIPIDEMIA LDL GOAL <130: ICD-10-CM

## 2021-07-20 DIAGNOSIS — Z13.0 ENCOUNTER FOR SCREENING FOR HEMATOLOGIC DISORDER: ICD-10-CM

## 2021-07-20 DIAGNOSIS — E66.811 CLASS 1 OBESITY DUE TO EXCESS CALORIES WITH SERIOUS COMORBIDITY AND BODY MASS INDEX (BMI) OF 32.0 TO 32.9 IN ADULT: ICD-10-CM

## 2021-07-20 DIAGNOSIS — Z13.228 ENCOUNTER FOR SCREENING FOR METABOLIC DISORDER: ICD-10-CM

## 2021-07-20 DIAGNOSIS — Z00.00 ENCOUNTER FOR MEDICARE ANNUAL WELLNESS EXAM: Primary | ICD-10-CM

## 2021-07-20 DIAGNOSIS — Z12.5 PROSTATE CANCER SCREENING: ICD-10-CM

## 2021-07-20 DIAGNOSIS — E66.09 CLASS 1 OBESITY DUE TO EXCESS CALORIES WITH SERIOUS COMORBIDITY AND BODY MASS INDEX (BMI) OF 32.0 TO 32.9 IN ADULT: ICD-10-CM

## 2021-07-20 DIAGNOSIS — R73.01 IFG (IMPAIRED FASTING GLUCOSE): ICD-10-CM

## 2021-07-20 LAB
% GRANULOCYTES: 72.1 % (ref 42.2–75.2)
HCT VFR BLD AUTO: 45.5 % (ref 39–51)
HEMOGLOBIN: 16 G/DL (ref 13.4–17.5)
LYMPHOCYTES NFR BLD AUTO: 20.7 % (ref 20.5–51.1)
MCH RBC QN AUTO: 30.8 PG (ref 27–31)
MCHC RBC AUTO-ENTMCNC: 35.3 G/DL (ref 33–37)
MCV RBC AUTO: 87.3 FL (ref 80–100)
MONOCYTES NFR BLD AUTO: 7.2 % (ref 1.7–9.3)
PLATELET # BLD AUTO: 233 K/UL (ref 140–450)
RBC # BLD AUTO: 5.21 X10/CMM (ref 4.2–5.9)
WBC # BLD AUTO: 5.6 X10/CMM (ref 3.8–11)

## 2021-07-20 PROCEDURE — 99214 OFFICE O/P EST MOD 30 MIN: CPT | Mod: 25 | Performed by: FAMILY MEDICINE

## 2021-07-20 PROCEDURE — 85025 COMPLETE CBC W/AUTO DIFF WBC: CPT | Performed by: FAMILY MEDICINE

## 2021-07-20 PROCEDURE — 36415 COLL VENOUS BLD VENIPUNCTURE: CPT | Performed by: FAMILY MEDICINE

## 2021-07-20 PROCEDURE — G0439 PPPS, SUBSEQ VISIT: HCPCS | Performed by: FAMILY MEDICINE

## 2021-07-20 ASSESSMENT — ANXIETY QUESTIONNAIRES
3. WORRYING TOO MUCH ABOUT DIFFERENT THINGS: NOT AT ALL
6. BECOMING EASILY ANNOYED OR IRRITABLE: NOT AT ALL
1. FEELING NERVOUS, ANXIOUS, OR ON EDGE: NOT AT ALL
IF YOU CHECKED OFF ANY PROBLEMS ON THIS QUESTIONNAIRE, HOW DIFFICULT HAVE THESE PROBLEMS MADE IT FOR YOU TO DO YOUR WORK, TAKE CARE OF THINGS AT HOME, OR GET ALONG WITH OTHER PEOPLE: NOT DIFFICULT AT ALL
7. FEELING AFRAID AS IF SOMETHING AWFUL MIGHT HAPPEN: NOT AT ALL
5. BEING SO RESTLESS THAT IT IS HARD TO SIT STILL: NOT AT ALL
2. NOT BEING ABLE TO STOP OR CONTROL WORRYING: NOT AT ALL
GAD7 TOTAL SCORE: 0

## 2021-07-20 ASSESSMENT — MIFFLIN-ST. JEOR: SCORE: 1701.69

## 2021-07-20 ASSESSMENT — PATIENT HEALTH QUESTIONNAIRE - PHQ9
SUM OF ALL RESPONSES TO PHQ QUESTIONS 1-9: 0
5. POOR APPETITE OR OVEREATING: NOT AT ALL

## 2021-07-20 NOTE — LETTER
"[facility] [address] [place]  [address]  Phone: [phone]    July 26, 2021      Walker Ortiz  [address] [address]              Dear Walker,     Your blood counts are all normal.     Your PSA is normal.     Your kidney function, liver function, blood sugar and electrolytes are all within the expected range.     Your cholesterol levels are at goal except triglycerides remain mildly elevated.  Triglycerides are a type of fat (lipid) found in your blood. They are sensitive to whether or not you have been fasting.  When you eat, your body converts any calories it doesn't need to use right away into triglycerides. The triglycerides are stored in your fat cells. Later, hormones release triglycerides for energy between meals. If you regularly eat more calories than you burn, particularly \"easy\" calories like carbohydrates and fats, you may have high triglycerides.  It is important to adjust your diet to improve this.     Your A1c, which is a reflection of glucose control in your body over approximately the past 3 months, is slightly elevated.  This is not diagnostic of diabetes but does put you at higher risk of developing it in the future.  It is important to make the necessary changes to diet and exercise to reduce your weight if needed.  You should follow up in 3-6 months to recheck this after implementing the recommended changes.     Please let me know if you have any questions.       Sincerely,     Jonh Vargas MD                 Results for orders placed or performed in visit on 07/20/21   PSA Total (Reflex To Free) ([facility])     Status: None   Result Value Ref Range    PSA NG/ML 0.8 0.0 - 4.0 ng/mL    . Comment     Narrative    Performed at:  01 - [facility] [address], [place], [place]  [id number]  : César Richards MD, Phone:  [phone]   CBC with Diff/Plt ([facility])     Status: None   Result Value Ref Range    WBC x10/cmm 5.6 3.8 - 11.0 " x10/cmm    % Lymphocytes 20.7 20.5 - 51.1 %    % Monocytes 7.2 1.7 - 9.3 %    % Granulocytes 72.1 42.2 - 75.2 %    RBC x10/cmm 5.21 4.2 - 5.9 x10/cmm    Hemoglobin 16.0 13.4 - 17.5 g/dl    Hematocrit 45.5 39 - 51 %    MCV 87.3 80 - 100 fL    MCH 30.8 27.0 - 31.0 pg    MCHC 35.3 33.0 - 37.0 g/dL    Platelet Count 233 140 - 450 K/uL   Comp. Metabolic Panel (14) ([facility])     Status: None   Result Value Ref Range    Glucose 95 65 - 99 mg/dL    Urea Nitrogen 18 8 - 27 mg/dL    Creatinine 0.99 0.76 - 1.27 mg/dL    eGFR If NonAfricn Am 77 >59 mL/min/1.73    eGFR If Africn Am 89 >59 mL/min/1.73    BUN/Creatinine Ratio 18 10 - 24    Sodium 140 134 - 144 mmol/L    Potassium 4.4 3.5 - 5.2 mmol/L    Chloride 100 96 - 106 mmol/L    Total CO2 22 20 - 29 mmol/L    Calcium 9.3 8.6 - 10.2 mg/dL    Protein Total 7.2 6.0 - 8.5 g/dL    Albumin 4.6 3.8 - 4.8 g/dL    Globulin, Total 2.6 1.5 - 4.5 g/dL    A/G Ratio 1.8 1.2 - 2.2    Bilirubin Total 0.8 0.0 - 1.2 mg/dL    Alkaline Phosphatase 85 48 - 121 IU/L    AST 23 0 - 40 IU/L    ALT 28 0 - 44 IU/L    Narrative    Performed at:  01 - [facility] [address], [place], [place]  [id number]  : César Richards MD, Phone:  [phone]   Lipid Panel ([facility])     Status: Abnormal   Result Value Ref Range    Cholesterol 157 100 - 199 mg/dL    Triglycerides 198 (H) 0 - 149 mg/dL    HDL Cholesterol 41 >39 mg/dL    VLDL Cholesterol Julio Cesar 33 5 - 40 mg/dL    LDL Cholesterol Calculated 83 0 - 99 mg/dL    LDL/HDL Ratio 2.0 0.0 - 3.6 ratio    Narrative    Performed at:  01 - [facility] [address], [place], [place]  [id number]  : César Richards MD, Phone:  [phone]   Hemoglobin A1C ([facility])     Status: Abnormal   Result Value Ref Range    Hemoglobin A1C 5.9 (H) 4.8 - 5.6 %    Narrative    Performed at:  01 - [facility] [address] [place], [place]  [id number]  : César Richards MD, Phone:  [phone]

## 2021-07-21 LAB
.: NORMAL
ALBUMIN SERPL-MCNC: 4.6 G/DL (ref 3.8–4.8)
ALBUMIN/GLOB SERPL: 1.8 {RATIO} (ref 1.2–2.2)
ALP SERPL-CCNC: 85 IU/L (ref 48–121)
ALT SERPL-CCNC: 28 IU/L (ref 0–44)
AST SERPL-CCNC: 23 IU/L (ref 0–40)
BILIRUB SERPL-MCNC: 0.8 MG/DL (ref 0–1.2)
BUN SERPL-MCNC: 18 MG/DL (ref 8–27)
BUN/CREATININE RATIO: 18 (ref 10–24)
CALCIUM SERPL-MCNC: 9.3 MG/DL (ref 8.6–10.2)
CHLORIDE SERPLBLD-SCNC: 100 MMOL/L (ref 96–106)
CHOLEST SERPL-MCNC: 157 MG/DL (ref 100–199)
CREAT SERPL-MCNC: 0.99 MG/DL (ref 0.76–1.27)
EGFR IF AFRICN AM: 89 ML/MIN/1.73
EGFR IF NONAFRICN AM: 77 ML/MIN/1.73
GLOBULIN, TOTAL: 2.6 G/DL (ref 1.5–4.5)
GLUCOSE SERPL-MCNC: 95 MG/DL (ref 65–99)
HBA1C MFR BLD: 5.9 % (ref 4.8–5.6)
HDLC SERPL-MCNC: 41 MG/DL
LDL/HDL RATIO: 2 RATIO (ref 0–3.6)
LDLC SERPL CALC-MCNC: 83 MG/DL (ref 0–99)
POTASSIUM SERPL-SCNC: 4.4 MMOL/L (ref 3.5–5.2)
PROT SERPL-MCNC: 7.2 G/DL (ref 6–8.5)
PSA NG/ML: 0.8 NG/ML (ref 0–4)
SODIUM SERPL-SCNC: 140 MMOL/L (ref 134–144)
TOTAL CO2: 22 MMOL/L (ref 20–29)
TRIGL SERPL-MCNC: 198 MG/DL (ref 0–149)
VLDLC SERPL CALC-MCNC: 33 MG/DL (ref 5–40)

## 2021-07-21 ASSESSMENT — ANXIETY QUESTIONNAIRES: GAD7 TOTAL SCORE: 0

## 2021-07-23 PROBLEM — R73.03 PREDIABETES: Status: ACTIVE | Noted: 2021-07-23

## 2021-08-25 ENCOUNTER — TRANSFERRED RECORDS (OUTPATIENT)
Dept: FAMILY MEDICINE | Facility: CLINIC | Age: 71
End: 2021-08-25

## 2022-01-10 DIAGNOSIS — E78.5 HYPERLIPIDEMIA LDL GOAL <130: ICD-10-CM

## 2022-01-10 RX ORDER — SIMVASTATIN 10 MG
TABLET ORAL
Qty: 90 TABLET | Refills: 0 | Status: SHIPPED | OUTPATIENT
Start: 2022-01-10 | End: 2022-04-05

## 2022-04-05 DIAGNOSIS — E78.5 HYPERLIPIDEMIA LDL GOAL <130: ICD-10-CM

## 2022-04-05 RX ORDER — SIMVASTATIN 10 MG
TABLET ORAL
Qty: 90 TABLET | Refills: 0 | Status: SHIPPED | OUTPATIENT
Start: 2022-04-05 | End: 2022-07-11

## 2022-04-05 NOTE — TELEPHONE ENCOUNTER
Simvastatin 10 mg    LOV and labs 7/220/21 cpx    No appt scheduled yet    Recent Labs   Lab Test 07/20/21  0844 03/21/19  0900   CHOL 157 160   HDL 41 37*   LDL 83 89   TRIG 198* 169*

## 2022-07-09 DIAGNOSIS — E78.5 HYPERLIPIDEMIA LDL GOAL <130: ICD-10-CM

## 2022-07-11 RX ORDER — SIMVASTATIN 10 MG
TABLET ORAL
Qty: 90 TABLET | Refills: 0 | Status: SHIPPED | OUTPATIENT
Start: 2022-07-11 | End: 2022-10-04

## 2022-07-11 NOTE — TELEPHONE ENCOUNTER
SIMVASTATIN  LOV ([facility]) 7/20/21  LAST LABS 7/20/21    Lab Results   Component Value Date    CHOL 157 07/20/2021    CHOL 160 03/21/2019     Lab Results   Component Value Date    HDL 41 07/20/2021    HDL 37 03/21/2019     Lab Results   Component Value Date    LDL 83 07/20/2021    LDL 89 03/21/2019     Lab Results   Component Value Date    TRIG 198 07/20/2021    TRIG 169 03/21/2019     Lab Results   Component Value Date    CHOLHDLRATIO 5.2 06/02/2006    CHOLHDLRATIO 5.5 07/17/2004

## 2022-09-26 NOTE — PROGRESS NOTES
"  SUBJECTIVE:   Walker Ortiz is a 72 year old male who presents for Preventive Visit.    Patient has been advised of split billing requirements and indicates understanding: Yes  Are you in the first 12 months of your Medicare Part B coverage?  No    Prediabetes: weight up 2 lbs.  Asymptomatic    HLD: on statin.  No CP, SOB.    Cough: ongoing since 9/23.  2 neg covid tests.  Feeling a bit better but cough persists.  No chest pain, fever.      Physical Health:    In general, how would you rate your overall physical health? good    Outside of work, how many days during the week do you exercise? none    Outside of work, approximately how many minutes a day do you exercise?not applicable    If you drink alcohol do you typically have >3 drinks per day or >7 drinks per week? No    Do you usually eat at least 4 servings of fruit and vegetables a day, include whole grains & fiber and avoid regularly eating high fat or \"junk\" foods? Yes    Do you have any problems taking medications regularly?  No    Do you have any side effects from medications? none    Needs assistance for the following daily activities: no assistance needed    Which of the following safety concerns are present in your home?  none identified     Hearing impairment: Yes, wears hearing aids.    In the past 6 months, have you been bothered by leaking of urine? no    Hearing Screening:  Not completed today due to known hearing loss, wears hearing aids.    Mental Health:    In general, how would you rate your overall mental or emotional health? excellent  PHQ-2 Score:      Do you feel safe in your environment? Yes    Have you ever done Advance Care Planning? (For example, a Health Directive, POLST, or a discussion with a medical provider or your loved ones about your wishes): No, advance care planning information given to patient to review.  Patient plans to discuss their wishes with loved ones or provider.      Additional concerns to address?  YES, " cough.    Fall risk:  Fallen 2 or more times in the past year?: No  Any fall with injury in the past year?: No    Cognitive Screenin) Repeat 3 items (Leader, Season, Table)    2) Clock draw: NORMAL  3) 3 item recall: Recalls 3 objects  Results: 3 items recalled: COGNITIVE IMPAIRMENT LESS LIKELY  Mini-CogTM Copyright MARTY John. Licensed by the author for use in [facility]; reprinted with permission ([email or web address]). All rights reserved.      Do you have sleep apnea, excessive snoring or daytime drowsiness?: yes, excessive snoring.        -------------------------------------    Reviewed and updated as needed this visit by clinical staff   Tobacco  Allergies  Meds                Reviewed and updated as needed this visit by Provider                   Social History     Tobacco Use     Smoking status: Never Smoker     Smokeless tobacco: Never Used   Substance Use Topics     Alcohol use: Yes     Comment: 1 a month                           Current providers sharing in care for this patient include:   Patient Care Team:  Jonh Vargas MD as PCP - General (Family Medicine)  Jonh Vargas MD as Assigned PCP    The following health maintenance items are reviewed in Epic and correct as of today:  Health Maintenance   Topic Date Due     COVID-19 Vaccine (5 - Booster for Pfizer series) 2022     PSA  2022     MEDICARE ANNUAL WELLNESS VISIT  2023     FALL RISK ASSESSMENT  2023     LIPID  2026     DTAP/TDAP/TD IMMUNIZATION (3 - Td or Tdap) 2026     ADVANCE CARE PLANNING  2027     COLORECTAL CANCER SCREENING  2029     HEPATITIS C SCREENING  Completed     PHQ-2 (once per calendar year)  Completed     INFLUENZA VACCINE  Completed     Pneumococcal Vaccine: 65+ Years  Completed     ZOSTER IMMUNIZATION  Completed     IPV IMMUNIZATION  Aged Out     MENINGITIS IMMUNIZATION  Aged Out     HEPATITIS B IMMUNIZATION  Aged Out     AORTIC ANEURYSM SCREENING  "(SYSTEM ASSIGNED)  Discontinued     Lab work is in process      ROS:  Constitutional, HEENT, cardiovascular, pulmonary, GI, , musculoskeletal, neuro, skin, endocrine and psych systems are negative, except as otherwise noted.    OBJECTIVE:   /78   Pulse 66   Temp 97.9  F (36.6  C)   Ht 1.74 m (5' 8.5\")   Wt 97.8 kg (215 lb 9.6 oz)   SpO2 95%   BMI 32.31 kg/m   Estimated body mass index is 32.31 kg/m  as calculated from the following:    Height as of this encounter: 1.74 m (5' 8.5\").    Weight as of this encounter: 97.8 kg (215 lb 9.6 oz).  EXAM:   GENERAL: healthy, alert and no distress  EYES: Eyes grossly normal to inspection, PERRL and conjunctivae and sclerae normal  HENT: ear canals and TM's normal, nose and mouth without ulcers or lesions  NECK: no adenopathy, no asymmetry, masses, or scars and thyroid normal to palpation  RESP: lungs clear to auscultation - no rales, rhonchi or wheezes  CV: regular rate and rhythm, normal S1 S2, no S3 or S4, no murmur, click or rub, no peripheral edema and peripheral pulses strong  ABDOMEN: soft, nontender, no hepatosplenomegaly, no masses and bowel sounds normal  MS: no gross musculoskeletal defects noted, no edema  SKIN: no suspicious lesions or rashes  NEURO: Normal strength and tone, mentation intact and speech normal  PSYCH: mentation appears normal, affect normal/bright    Diagnostic Test Results:  Labs reviewed in Epic    ASSESSMENT / PLAN:   Encounter for Medicare annual wellness exam  - discussed preventative guidelines, healthy diet, exercise and weight management  - VENOUS COLLECTION    Prediabetes  Discussed importance of weight loss in detail  - Basic Metabolic Panel (8) ([facility])  - Hemoglobin A1C ([facility])  - VENOUS COLLECTION    Class 1 obesity due to excess calories with serious comorbidity and body mass index (BMI) of 32.0 to 32.9 in adult  - Discussed importance of optimizing diet, exercise and healthy weight  - VENOUS " "COLLECTION    Hyperlipidemia LDL goal <130  Recheck, cont statin for primary prevention  - Lipid Profile ([facility])  - Basic Metabolic Panel (8) ([facility])  - VENOUS COLLECTION    Cough  Reassuring exam.  Symptomatic cares.  Follow up if not improved or worse  - benzonatate (TESSALON) 200 MG capsule; Take 1 capsule (200 mg) by mouth 3 times daily as needed for cough  - VENOUS COLLECTION    Prostate cancer screening  The natural history of prostate cancer and ongoing controversy regarding screening and potential treatment outcomes of prostate cancer has been discussed with the patient. The meaning of a false positive PSA and a false negative PSA has been discussed, as well as the concept of overdiagnosis.  He indicates understanding of the limitations of this screening test and wishes to proceed with screening PSA testing.   - PSA Serum ([facility])  - VENOUS COLLECTION      Patient has been advised of split billing requirements and indicates understanding: Yes    COUNSELING:  Reviewed preventive health counseling, as reflected in patient instructions       Regular exercise       Healthy diet/nutrition    Estimated body mass index is 32.31 kg/m  as calculated from the following:    Height as of this encounter: 1.74 m (5' 8.5\").    Weight as of this encounter: 97.8 kg (215 lb 9.6 oz).    Weight management plan: Discussed healthy diet and exercise guidelines    He reports that he has never smoked. He has never used smokeless tobacco.    Appropriate preventive services were discussed with this patient, including applicable screening as appropriate for cardiovascular disease, diabetes, osteopenia/osteoporosis, and glaucoma.  As appropriate for age/gender, discussed screening for colorectal cancer, prostate cancer, breast cancer, and cervical cancer. Checklist reviewing preventive services available has been given to the patient.    Reviewed patients plan of care and provided an AVS. The Basic Care Plan (routine screening as " documented in Health Maintenance) for Walker meets the Care Plan requirement. This Care Plan has been established and reviewed with the Patient.    Counseling Resources:  ATP IV Guidelines  Pooled Cohorts Equation Calculator  Breast Cancer Risk Calculator  BRCA-Related Cancer Risk Assessment: FHS-7 Tool  FRAX Risk Assessment  ICSI Preventive Guidelines  Dietary Guidelines for Americans, 2010  USDA's MyPlate  ASA Prophylaxis  Lung CA Screening    Jonh Vargas MD  [facility]

## 2022-09-26 NOTE — PATIENT INSTRUCTIONS
Patient Education   Personalized Prevention Plan  You are due for the preventive services outlined below.  Your care team is available to assist you in scheduling these services.  If you have already completed any of these items, please share that information with your care team to update in your medical record.  Health Maintenance Due   Topic Date Due    PHQ-2 (once per calendar year)  01/01/2022    COVID-19 Vaccine (5 - Booster for Pfizer series) 06/06/2022    Prostate Test  07/20/2022    FALL RISK ASSESSMENT  07/20/2022

## 2022-09-27 ENCOUNTER — OFFICE VISIT (OUTPATIENT)
Dept: FAMILY MEDICINE | Facility: CLINIC | Age: 72
End: 2022-09-27

## 2022-09-27 VITALS
WEIGHT: 215.6 LBS | SYSTOLIC BLOOD PRESSURE: 124 MMHG | HEIGHT: 69 IN | OXYGEN SATURATION: 95 % | HEART RATE: 66 BPM | BODY MASS INDEX: 31.93 KG/M2 | TEMPERATURE: 97.9 F | DIASTOLIC BLOOD PRESSURE: 78 MMHG

## 2022-09-27 DIAGNOSIS — E66.09 CLASS 1 OBESITY DUE TO EXCESS CALORIES WITH SERIOUS COMORBIDITY AND BODY MASS INDEX (BMI) OF 32.0 TO 32.9 IN ADULT: ICD-10-CM

## 2022-09-27 DIAGNOSIS — Z12.5 PROSTATE CANCER SCREENING: ICD-10-CM

## 2022-09-27 DIAGNOSIS — R05.9 COUGH: ICD-10-CM

## 2022-09-27 DIAGNOSIS — E66.811 CLASS 1 OBESITY DUE TO EXCESS CALORIES WITH SERIOUS COMORBIDITY AND BODY MASS INDEX (BMI) OF 32.0 TO 32.9 IN ADULT: ICD-10-CM

## 2022-09-27 DIAGNOSIS — Z00.00 ENCOUNTER FOR MEDICARE ANNUAL WELLNESS EXAM: Primary | ICD-10-CM

## 2022-09-27 DIAGNOSIS — R73.03 PREDIABETES: ICD-10-CM

## 2022-09-27 DIAGNOSIS — E78.5 HYPERLIPIDEMIA LDL GOAL <130: ICD-10-CM

## 2022-09-27 LAB
CHOL/HDL RATIO (RMG): 3.9 MG/DL (ref 0–4.5)
CHOLESTEROL: 129 MG/DL (ref 100–199)
HDL (RMG): 33 MG/DL (ref 40–?)
LDL CALCULATED (RMG): 70 MG/DL (ref 0–130)
TRIGLYCERIDES (RMG): 132 MG/DL (ref 0–149)

## 2022-09-27 PROCEDURE — 99214 OFFICE O/P EST MOD 30 MIN: CPT | Mod: 25 | Performed by: FAMILY MEDICINE

## 2022-09-27 PROCEDURE — 80061 LIPID PANEL: CPT | Mod: QW | Performed by: FAMILY MEDICINE

## 2022-09-27 PROCEDURE — 36415 COLL VENOUS BLD VENIPUNCTURE: CPT | Performed by: FAMILY MEDICINE

## 2022-09-27 PROCEDURE — G0439 PPPS, SUBSEQ VISIT: HCPCS | Performed by: FAMILY MEDICINE

## 2022-09-27 RX ORDER — BENZONATATE 200 MG/1
200 CAPSULE ORAL 3 TIMES DAILY PRN
Qty: 30 CAPSULE | Refills: 0 | Status: SHIPPED | OUTPATIENT
Start: 2022-09-27 | End: 2022-10-04

## 2022-09-27 RX ORDER — AMOXICILLIN 500 MG/1
CAPSULE ORAL
COMMUNITY
Start: 2021-12-23 | End: 2023-10-23

## 2022-09-27 NOTE — LETTER
[facility] [address] [place]  [address]  Phone: [phone]    September 28, 2022      Walker Ortiz  [address] [facility] [address]              Dear Walker,     Your recent lab results are within the expected range.       Your A1c remains in the prediabetes range but has increased closer to diabetes.  Please focus on reducing sugar intake and increasing exercise.  We will recheck at your next visit.     Your prostate test remains in the normal range.     Your cholesterol levels remain very good.     It was very nice seeing you.  If you have any questions, please contact our office.         Sincerely,     Jonh Vargas MD       Results for orders placed or performed in visit on 09/27/22   Lipid Profile ([facility])     Status: Abnormal   Result Value Ref Range    CHOLESTEROL 129 100 - 199 mg/dl    HDL 33 (A) 40 mg/dl    TRIGLYCERIDES ([facility]) 132 0 - 149 mg/dl    LDL CALCULATED ([facility]) 70 0 - 130 mg/dl    CHOL/HDL RATIO ([facility]) 3.9 0.0 - 4.5 mg/dl   Basic Metabolic Panel (8) ([facility])     Status: Abnormal   Result Value Ref Range    Glucose 103 (H) 70 - 99 mg/dL    Urea Nitrogen 17 8 - 27 mg/dL    Creatinine 1.02 0.76 - 1.27 mg/dL    eGFR  79 >59 mL/min/1.73    BUN/Creatinine Ratio 17 10 - 24    Sodium 139 134 - 144 mmol/L    Potassium 5.0 3.5 - 5.2 mmol/L    Chloride 102 96 - 106 mmol/L    Total CO2 24 20 - 29 mmol/L    Calcium 9.6 8.6 - 10.2 mg/dL    Narrative    Performed at:  01 - [facility] [address], [place], [place]  [id number]  : César Richards MD, Phone:  [phone]   Hemoglobin A1C ([facility])     Status: Abnormal   Result Value Ref Range    Hemoglobin A1C 6.3 (H) 4.8 - 5.6 %    Narrative    Performed at:  01 - [facility] [address], [place], [place]  [id number]  : César Richards MD, Phone:  [phone]   PSA Serum ([facility])     Status: None   Result Value Ref Range    PSA NG/ML 1.2 0.0 - 4.0 ng/mL    Narrative    Performed at:  01 -  [facility]  [address] [facility], [place], [place]  [id number]  : César Richards MD, Phone:  [phone]

## 2022-09-28 LAB
BUN SERPL-MCNC: 17 MG/DL (ref 8–27)
BUN/CREATININE RATIO: 17 (ref 10–24)
CALCIUM SERPL-MCNC: 9.6 MG/DL (ref 8.6–10.2)
CHLORIDE SERPLBLD-SCNC: 102 MMOL/L (ref 96–106)
CREAT SERPL-MCNC: 1.02 MG/DL (ref 0.76–1.27)
EGFR: 79 ML/MIN/1.73
GLUCOSE SERPL-MCNC: 103 MG/DL (ref 70–99)
HBA1C MFR BLD: 6.3 % (ref 4.8–5.6)
POTASSIUM SERPL-SCNC: 5 MMOL/L (ref 3.5–5.2)
PSA NG/ML: 1.2 NG/ML (ref 0–4)
SODIUM SERPL-SCNC: 139 MMOL/L (ref 134–144)
TOTAL CO2: 24 MMOL/L (ref 20–29)

## 2022-10-04 DIAGNOSIS — R05.9 COUGH: ICD-10-CM

## 2022-10-04 DIAGNOSIS — E78.5 HYPERLIPIDEMIA LDL GOAL <130: ICD-10-CM

## 2022-10-04 RX ORDER — BENZONATATE 200 MG/1
200 CAPSULE ORAL 3 TIMES DAILY PRN
Qty: 30 CAPSULE | Refills: 0 | Status: SHIPPED | OUTPATIENT
Start: 2022-10-04 | End: 2022-10-26

## 2022-10-04 RX ORDER — SIMVASTATIN 10 MG
TABLET ORAL
Qty: 90 TABLET | Refills: 3 | Status: SHIPPED | OUTPATIENT
Start: 2022-10-04 | End: 2023-10-02

## 2022-10-04 NOTE — TELEPHONE ENCOUNTER
Patient calls requesting refill on benzonatate 200mg #30 si po TID prn cough. Saw Dr. Vargas last week for cpx and filled this med for cough since  but is heading out of town and would like to have refill on hand to take with him just in case cough lingers.     Plan: okay per Dr. Vargas for refill on benzonatate #30 caps and simvastatin for 1 year.   Ricarda Perez RN

## 2022-10-04 NOTE — TELEPHONE ENCOUNTER
Simvastatin  LOV 9/27/22  Hyperlipidemia LDL goal <130  Recheck, cont statin for primary prevention  - Lipid Profile ([facility])  - Basic Metabolic Panel (8) ([facility])  - VENOUS COLLECTION  Component      Latest Ref Rng & Units 9/27/2022   Glucose      70 - 99 mg/dL 103 (H)   Urea Nitrogen      8 - 27 mg/dL 17   Creatinine      0.76 - 1.27 mg/dL 1.02   eGFR       >59 mL/min/1.73 79   BUN/Creatinine Ratio      10 - 24 17   Sodium      134 - 144 mmol/L 139   Potassium      3.5 - 5.2 mmol/L 5.0   Chloride      96 - 106 mmol/L 102   Total CO2      20 - 29 mmol/L 24   Calcium      8.6 - 10.2 mg/dL 9.6   CHOLESTEROL      100 - 199 mg/dl 129   HDL      40 mg/dl 33 (A)   TRIGLYCERIDES ([facility])      0 - 149 mg/dl 132   LDL CALCULATED ([facility])      0 - 130 mg/dl 70   CHOL/HDL RATIO ([facility])      0.0 - 4.5 mg/dl 3.9

## 2022-10-26 ENCOUNTER — OFFICE VISIT (OUTPATIENT)
Dept: FAMILY MEDICINE | Facility: CLINIC | Age: 72
End: 2022-10-26

## 2022-10-26 VITALS
TEMPERATURE: 97.7 F | DIASTOLIC BLOOD PRESSURE: 73 MMHG | BODY MASS INDEX: 31.77 KG/M2 | HEART RATE: 72 BPM | OXYGEN SATURATION: 95 % | SYSTOLIC BLOOD PRESSURE: 120 MMHG | WEIGHT: 212 LBS

## 2022-10-26 DIAGNOSIS — R05.2 SUBACUTE COUGH: Primary | ICD-10-CM

## 2022-10-26 PROCEDURE — 99213 OFFICE O/P EST LOW 20 MIN: CPT | Performed by: FAMILY MEDICINE

## 2022-10-26 RX ORDER — FLUTICASONE PROPIONATE 50 MCG
2 SPRAY, SUSPENSION (ML) NASAL DAILY
Qty: 16 G | Refills: 0 | Status: SHIPPED | OUTPATIENT
Start: 2022-10-26 | End: 2023-10-23

## 2022-10-26 RX ORDER — DOXYCYCLINE HYCLATE 100 MG
100 TABLET ORAL 2 TIMES DAILY
Qty: 14 TABLET | Refills: 0 | Status: SHIPPED | OUTPATIENT
Start: 2022-10-26 | End: 2022-11-02

## 2023-04-09 NOTE — PROGRESS NOTES
Edwin Cardoso is a 72 year old patient who presents to clinic for evaluation.  Cough ongoing >1 month.  Mostly dry but occasionally productive.  Using oral antihistamines and tessalon without improvement.  No fever, chills, wheezing, SOB.  Some PND.          Review of Systems   Constitutional, HEENT, cardiovascular, pulmonary, GI, , musculoskeletal, neuro, skin, endocrine and psych systems are negative, except as otherwise noted.      Objective    /73   Pulse 72   Temp 97.7  F (36.5  C)   Wt 96.2 kg (212 lb)   SpO2 95%   BMI 31.77 kg/m      General: Well appearing, NAD  HEENT: CLear  Heart: RRR, no murmur  Chest: very faint crackles right base, lungs otherwise clear  Skin: Clear  Psych: normal mood and affect      Subacute cough  Overall very reassuring exam, vitals, appearance.  Will treat due to duration with doxy.  Also add flonase for possible PND.  If not improved will obtain CXR.      Patient is agreement with the assessment and plan as outlined above.  All questions answered.  Red flag symptoms that should prompt emergent evaluation discussed and understood.    - doxycycline hyclate (VIBRA-TABS) 100 MG tablet; Take 1 tablet (100 mg) by mouth 2 times daily for 7 days  - fluticasone (FLONASE) 50 MCG/ACT nasal spray; Spray 2 sprays into both nostrils daily              
generalized

## 2023-05-11 ENCOUNTER — TRANSFERRED RECORDS (OUTPATIENT)
Dept: FAMILY MEDICINE | Facility: CLINIC | Age: 73
End: 2023-05-11

## 2023-10-01 DIAGNOSIS — E78.5 HYPERLIPIDEMIA LDL GOAL <130: ICD-10-CM

## 2023-10-02 RX ORDER — SIMVASTATIN 10 MG
TABLET ORAL
Qty: 90 TABLET | Refills: 1 | Status: SHIPPED | OUTPATIENT
Start: 2023-10-02 | End: 2024-04-01

## 2023-10-02 NOTE — CONFIDENTIAL NOTE
Med: SIMVASTATIN    LOV (related): 9/27/22 - CPX    Last Lab: 9/27/23      Due for F/U around: N/A    Next Appt: 10/23/23        Cholesterol   Date Value Ref Range Status   09/27/2022 129 100 - 199 mg/dl Final   07/20/2021 157 100 - 199 mg/dL Final   03/21/2019 160 100 - 199 mg/dL Final     HDL Cholesterol   Date Value Ref Range Status   07/20/2021 41 >39 mg/dL Final   03/21/2019 37 (L) >39 mg/dL Final     HDL   Date Value Ref Range Status   09/27/2022 33 (A) 40 mg/dl Final     LDL Cholesterol Calculated   Date Value Ref Range Status   07/20/2021 83 0 - 99 mg/dL Final   03/21/2019 89 0 - 99 mg/dL Final     LDL CALCULATED ([facility])   Date Value Ref Range Status   09/27/2022 70 0 - 130 mg/dl Final     Triglycerides   Date Value Ref Range Status   09/27/2022 132 0 - 149 mg/dl Final   07/20/2021 198 (H) 0 - 149 mg/dL Final   03/21/2019 169 (H) 0 - 149 mg/dL Final     Cholesterol/HDL Ratio   Date Value Ref Range Status   06/02/2006 5.2 (H) 0.0 - 5.0 Final   07/17/2004 5.5 (H) 0.0 - 5.0 Final

## 2023-10-20 NOTE — PROGRESS NOTES
"SUBJECTIVE:   Roberto is a 73 year old who presents for Preventive Visit.    Are you in the first 12 months of your Medicare coverage?  No    Prediabetes: weight down 5 lbs.  Asymptomatic     HLD: on statin.  No CP, SOB.    Snoring: heavy.  No definite apneic episodes.    Chronic right shoulder pain.  MRI done with Dr Ramirez in 2019 showed partial RTC tear.  He does not think he did PT and cannot recall if injection helped.    Healthy Habits:     In general, how would you rate your overall health?  Excellent    Frequency of exercise:  None    Do you usually eat at least 4 servings of fruit and vegetables a day, include whole grains    & fiber and avoid regularly eating high fat or \"junk\" foods?  No    Taking medications regularly:  Yes    Ability to successfully perform activities of daily living:  No assistance needed    Home Safety:  No safety concerns identified    Hearing Impairment:  Difficulty following a conversation in a noisy restaurant or crowded room and difficulty understanding soft or whispered speech    In the past 6 months, have you been bothered by leaking of urine?  No    In general, how would you rate your overall mental or emotional health?  Excellent    Additional concerns today:  Yes  Musculoskeletal Problem  Associated symptoms include arthralgias and joint swelling. Pertinent negatives include no abdominal pain, chest pain, chills, congestion, coughing, fever, headaches, myalgias, nausea, rash, sore throat or weakness.     Have you ever done Advance Care Planning? (For example, a Health Directive, POLST, or a discussion with a medical provider or your loved ones about your wishes): No, advance care planning information given to patient to review.  Patient plans to discuss their wishes with loved ones or provider.      Fall risk  Fallen 2 or more times in the past year?: No  Any fall with injury in the past year?: No    Cognitive Screening   1) Repeat 3 items (Leader, Season, Table)    2) Clock " draw: NORMAL  3) 3 item recall: Recalls 3 objects  Results: 3 items recalled: COGNITIVE IMPAIRMENT LESS LIKELY  Mini-CogTM Copyright MARTY John. Licensed by the author for use in [facility]; reprinted with permission ([email or web address]). All rights reserved.      Do you have sleep apnea, excessive snoring or daytime drowsiness? : yes    Reviewed and updated as needed this visit by clinical staff   Tobacco  Allergies  Meds              Reviewed and updated as needed this visit by Provider                 Social History     Tobacco Use    Smoking status: Never    Smokeless tobacco: Never   Substance Use Topics    Alcohol use: Yes     Comment: 1 a month             10/22/2023     6:55 PM   Alcohol Use   Prescreen: >3 drinks/day or >7 drinks/week? No          No data to display              Do you have a current opioid prescription? No  Do you use any other controlled substances or medications that are not prescribed by a provider? None          -------------------------------------    Current providers sharing in care for this patient include:   Patient Care Team:  Jonh Vargas MD as PCP - General (Family Medicine)  Jonh Vargas MD as Assigned PCP    The following health maintenance items are reviewed in Epic and correct as of today:  Health Maintenance   Topic Date Due    RSV VACCINE 60+ (1 - 1-dose 60+ series) Never done    INFLUENZA VACCINE (1) 09/01/2023    COVID-19 Vaccine (6 - 2023-24 season) 09/01/2023    PSA  09/27/2023    MEDICARE ANNUAL WELLNESS VISIT  10/23/2024    FALL RISK ASSESSMENT  10/23/2024    DTAP/TDAP/TD IMMUNIZATION (3 - Td or Tdap) 12/28/2026    LIPID  09/27/2027    ADVANCE CARE PLANNING  09/27/2027    COLORECTAL CANCER SCREENING  04/03/2029    HEPATITIS C SCREENING  Completed    PHQ-2 (once per calendar year)  Completed    Pneumococcal Vaccine: 65+ Years  Completed    ZOSTER IMMUNIZATION  Completed    IPV IMMUNIZATION  Aged Out    HPV IMMUNIZATION  Aged Out     "MENINGITIS IMMUNIZATION  Aged Out    AORTIC ANEURYSM SCREENING (SYSTEM ASSIGNED)  Discontinued     Lab work is in process        Review of Systems   Constitutional:  Negative for chills and fever.   HENT:  Negative for congestion, ear pain, hearing loss and sore throat.    Eyes:  Negative for pain and visual disturbance.   Respiratory:  Negative for cough and shortness of breath.    Cardiovascular:  Negative for chest pain, palpitations and peripheral edema.   Gastrointestinal:  Negative for abdominal pain, constipation, diarrhea, heartburn, hematochezia and nausea.   Genitourinary:  Positive for frequency. Negative for dysuria, genital sores, hematuria, impotence, penile discharge and urgency.   Musculoskeletal:  Positive for arthralgias and joint swelling. Negative for myalgias.   Skin:  Negative for rash.   Neurological:  Negative for dizziness, weakness, headaches and paresthesias.   Psychiatric/Behavioral:  Negative for mood changes. The patient is not nervous/anxious.      Constitutional, HEENT, cardiovascular, pulmonary, GI, , musculoskeletal, neuro, skin, endocrine and psych systems are negative, except as otherwise noted.    OBJECTIVE:   There were no vitals taken for this visit. Estimated body mass index is 31.77 kg/m  as calculated from the following:    Height as of 9/27/22: 1.74 m (5' 8.5\").    Weight as of 10/26/22: 96.2 kg (212 lb).  Physical Exam  GENERAL: healthy, alert and no distress  EYES: Eyes grossly normal to inspection, PERRL and conjunctivae and sclerae normal  HENT: ear canals and TM's normal, nose and mouth without ulcers or lesions  NECK: no adenopathy, no asymmetry, masses, or scars and thyroid normal to palpation  RESP: lungs clear to auscultation - no rales, rhonchi or wheezes  CV: regular rate and rhythm, normal S1 S2, no S3 or S4, no murmur, click or rub, no peripheral edema and peripheral pulses strong  ABDOMEN: soft, nontender, no hepatosplenomegaly, no masses and bowel sounds " normal  SKIN: no suspicious lesions or rashes  NEURO: Normal strength and tone, mentation intact and speech normal  PSYCH: mentation appears normal, affect normal/bright    Diagnostic Test Results:  Labs reviewed in Epic  Results for orders placed or performed in visit on 10/23/23 (from the past 24 hour(s))   Lipid Profile ([facility])   Result Value Ref Range    Cholesterol 131 100 - 199 mg/dL    HDL 37 (A) 40 mg/dL    Triglycerides 140 0 - 149 mg/dL    LDL CALCULATED ([facility]) 66 0 - 130 mg/dL    Patient Fasting? Yes        ASSESSMENT / PLAN:   Encounter for Medicare annual wellness exam  - discussed preventative guidelines, healthy diet, exercise and weight management    Prediabetes  Recheck, cont lifestyle changes, weight loss  - Hemoglobin A1c; Future  - VENOUS COLLECTION  - Comprehensive metabolic panel; Future  - Hemoglobin A1c  - Comprehensive metabolic panel    Hyperlipidemia LDL goal <130  stable/controlled. Cont current medication(s) and treatment  - Lipid Profile ([facility])  - VENOUS COLLECTION  - Comprehensive metabolic panel; Future  - Comprehensive metabolic panel    Chronic right shoulder pain  Recommend PT and follow up with [facility] if not improved  - Physical Therapy Referral; Future    Prostate cancer screening  The natural history of prostate cancer and ongoing controversy regarding screening and potential treatment outcomes of prostate cancer has been discussed with the patient. The meaning of a false positive PSA and a false negative PSA has been discussed, as well as the concept of overdiagnosis.  He indicates understanding of the limitations of this screening test and wishes to proceed with screening PSA testing.   - Prostate Specific Antigen Screen; Future  - Prostate Specific Antigen Screen    Snoring  Recommend sleep eval  - Adult Sleep Eval & Management  Referral - To a [facility] Location (Use POS/Location); Future      Patient has been advised of split billing requirements and indicates  understanding: Yes      COUNSELING:  Reviewed preventive health counseling, as reflected in patient instructions       Regular exercise       Healthy diet/nutrition       Prostate cancer screening        He reports that he has never smoked. He has never used smokeless tobacco.      Appropriate preventive services were discussed with this patient, including applicable screening as appropriate for fall prevention, nutrition, physical activity, Tobacco-use cessation, weight loss and cognition.  Checklist reviewing preventive services available has been given to the patient.    Reviewed patients plan of care and provided an AVS. The Basic Care Plan (routine screening as documented in Health Maintenance) for Walker meets the Care Plan requirement. This Care Plan has been established and reviewed with the Patient.          Jonh Vargas MD  [facility]    Identified Health Risks:  I have reviewed Opioid Use Disorder and Substance Use Disorder risk factors and made any needed referrals.

## 2023-10-22 ASSESSMENT — ENCOUNTER SYMPTOMS
FREQUENCY: 1
COUGH: 0
DIZZINESS: 0
WEAKNESS: 0
ABDOMINAL PAIN: 0
DIARRHEA: 0
ARTHRALGIAS: 1
DYSURIA: 0
NAUSEA: 0
CHILLS: 0
NERVOUS/ANXIOUS: 0
HEMATOCHEZIA: 0
EYE PAIN: 0
HEADACHES: 0
CONSTIPATION: 0
SHORTNESS OF BREATH: 0
PALPITATIONS: 0
FEVER: 0
HEMATURIA: 0
PARESTHESIAS: 0
HEARTBURN: 0
MYALGIAS: 0
SORE THROAT: 0
JOINT SWELLING: 1

## 2023-10-22 ASSESSMENT — ACTIVITIES OF DAILY LIVING (ADL): CURRENT_FUNCTION: NO ASSISTANCE NEEDED

## 2023-10-23 ENCOUNTER — OFFICE VISIT (OUTPATIENT)
Dept: FAMILY MEDICINE | Facility: CLINIC | Age: 73
End: 2023-10-23

## 2023-10-23 VITALS
OXYGEN SATURATION: 97 % | WEIGHT: 207.4 LBS | BODY MASS INDEX: 31.43 KG/M2 | SYSTOLIC BLOOD PRESSURE: 134 MMHG | HEIGHT: 68 IN | HEART RATE: 58 BPM | DIASTOLIC BLOOD PRESSURE: 80 MMHG

## 2023-10-23 DIAGNOSIS — E78.5 HYPERLIPIDEMIA LDL GOAL <130: ICD-10-CM

## 2023-10-23 DIAGNOSIS — Z00.00 ENCOUNTER FOR MEDICARE ANNUAL WELLNESS EXAM: Primary | ICD-10-CM

## 2023-10-23 DIAGNOSIS — R73.03 PREDIABETES: ICD-10-CM

## 2023-10-23 DIAGNOSIS — Z12.5 PROSTATE CANCER SCREENING: ICD-10-CM

## 2023-10-23 DIAGNOSIS — R06.83 SNORING: ICD-10-CM

## 2023-10-23 DIAGNOSIS — M25.511 CHRONIC RIGHT SHOULDER PAIN: ICD-10-CM

## 2023-10-23 DIAGNOSIS — G89.29 CHRONIC RIGHT SHOULDER PAIN: ICD-10-CM

## 2023-10-23 LAB
ALBUMIN SERPL BCG-MCNC: 4.4 G/DL (ref 3.5–5.2)
ALP SERPL-CCNC: 87 U/L (ref 40–129)
ALT SERPL W P-5'-P-CCNC: 25 U/L (ref 0–70)
ANION GAP SERPL CALCULATED.3IONS-SCNC: 13 MMOL/L (ref 7–15)
AST SERPL W P-5'-P-CCNC: 32 U/L (ref 0–45)
BILIRUB SERPL-MCNC: 0.9 MG/DL
BUN SERPL-MCNC: 14 MG/DL (ref 8–23)
CALCIUM SERPL-MCNC: 9.4 MG/DL (ref 8.8–10.2)
CHLORIDE SERPL-SCNC: 104 MMOL/L (ref 98–107)
CHOLESTEROL: 131 MG/DL (ref 100–199)
CREAT SERPL-MCNC: 0.94 MG/DL (ref 0.67–1.17)
DEPRECATED HCO3 PLAS-SCNC: 22 MMOL/L (ref 22–29)
EGFRCR SERPLBLD CKD-EPI 2021: 86 ML/MIN/1.73M2
FASTING?: YES
GLUCOSE SERPL-MCNC: 96 MG/DL (ref 70–99)
HBA1C MFR BLD: 6 %
HDL (RMG): 37 MG/DL (ref 40–?)
LDL CALCULATED (RMG): 66 MG/DL (ref 0–130)
POTASSIUM SERPL-SCNC: 4.4 MMOL/L (ref 3.4–5.3)
PROT SERPL-MCNC: 7.6 G/DL (ref 6.4–8.3)
PSA SERPL DL<=0.01 NG/ML-MCNC: 1.07 NG/ML (ref 0–6.5)
SODIUM SERPL-SCNC: 139 MMOL/L (ref 135–145)
TRIGLYCERIDES (RMG): 140 MG/DL (ref 0–149)

## 2023-10-23 PROCEDURE — 36415 COLL VENOUS BLD VENIPUNCTURE: CPT | Performed by: FAMILY MEDICINE

## 2023-10-23 PROCEDURE — 80061 LIPID PANEL: CPT | Mod: QW | Performed by: FAMILY MEDICINE

## 2023-10-23 PROCEDURE — 99214 OFFICE O/P EST MOD 30 MIN: CPT | Mod: 25 | Performed by: FAMILY MEDICINE

## 2023-10-23 PROCEDURE — G0008 ADMIN INFLUENZA VIRUS VAC: HCPCS | Mod: 59 | Performed by: FAMILY MEDICINE

## 2023-10-23 PROCEDURE — 80053 COMPREHEN METABOLIC PANEL: CPT | Performed by: FAMILY MEDICINE

## 2023-10-23 PROCEDURE — G0439 PPPS, SUBSEQ VISIT: HCPCS | Performed by: FAMILY MEDICINE

## 2023-10-23 PROCEDURE — 83036 HEMOGLOBIN GLYCOSYLATED A1C: CPT | Performed by: FAMILY MEDICINE

## 2023-10-23 PROCEDURE — G0103 PSA SCREENING: HCPCS | Performed by: FAMILY MEDICINE

## 2023-10-23 PROCEDURE — 90694 VACC AIIV4 NO PRSRV 0.5ML IM: CPT | Performed by: FAMILY MEDICINE

## 2023-10-23 ASSESSMENT — ENCOUNTER SYMPTOMS
CHILLS: 0
ARTHRALGIAS: 1
HEADACHES: 0
EYE PAIN: 0
ABDOMINAL PAIN: 0
CONSTIPATION: 0
DIARRHEA: 0
PALPITATIONS: 0
DIZZINESS: 0
FEVER: 0
PARESTHESIAS: 0
COUGH: 0
HEMATURIA: 0
HEMATOCHEZIA: 0
NERVOUS/ANXIOUS: 0
SORE THROAT: 0
DYSURIA: 0
NAUSEA: 0
MYALGIAS: 0
FREQUENCY: 1
JOINT SWELLING: 1
WEAKNESS: 0
SHORTNESS OF BREATH: 0
HEARTBURN: 0

## 2023-10-23 ASSESSMENT — ACTIVITIES OF DAILY LIVING (ADL): CURRENT_FUNCTION: NO ASSISTANCE NEEDED

## 2023-12-21 ENCOUNTER — TRANSFERRED RECORDS (OUTPATIENT)
Dept: FAMILY MEDICINE | Facility: CLINIC | Age: 73
End: 2023-12-21

## 2024-03-29 DIAGNOSIS — E78.5 HYPERLIPIDEMIA LDL GOAL <130: ICD-10-CM

## 2024-04-01 ENCOUNTER — OFFICE VISIT (OUTPATIENT)
Dept: FAMILY MEDICINE | Facility: CLINIC | Age: 74
End: 2024-04-01

## 2024-04-01 VITALS
SYSTOLIC BLOOD PRESSURE: 160 MMHG | OXYGEN SATURATION: 97 % | DIASTOLIC BLOOD PRESSURE: 138 MMHG | HEIGHT: 68 IN | WEIGHT: 212 LBS | BODY MASS INDEX: 32.13 KG/M2 | HEART RATE: 71 BPM

## 2024-04-01 DIAGNOSIS — R19.8 ABNORMAL BOWEL MOVEMENT: Primary | ICD-10-CM

## 2024-04-01 DIAGNOSIS — R03.0 ELEVATED BLOOD PRESSURE READING WITHOUT DIAGNOSIS OF HYPERTENSION: ICD-10-CM

## 2024-04-01 PROCEDURE — G2211 COMPLEX E/M VISIT ADD ON: HCPCS | Performed by: FAMILY MEDICINE

## 2024-04-01 PROCEDURE — 99214 OFFICE O/P EST MOD 30 MIN: CPT | Performed by: FAMILY MEDICINE

## 2024-04-01 RX ORDER — SIMVASTATIN 10 MG
TABLET ORAL
Qty: 90 TABLET | Refills: 0 | Status: SHIPPED | OUTPATIENT
Start: 2024-04-01 | End: 2024-06-25

## 2024-04-01 NOTE — PROGRESS NOTES
"  Edwin Cardoso is a 73 year old patient who presents to clinic for evaluation.  Reports about once every 1-2 months he has a discomfort in his lower abdomen followed by immediate need to have a bowel movement and a very watery stool.  This lasts for about one day and resolves.  He had remote sigmoid colectomy for diverticulitis in the mid 1990s.  No fever or chills, melena or hematochezia          Review of Systems   Constitutional, HEENT, cardiovascular, pulmonary, GI, , musculoskeletal, neuro, skin, endocrine and psych systems are negative, except as otherwise noted.      Objective    BP (!) 160/138   Pulse 71   Ht 1.727 m (5' 8\")   Wt 96.2 kg (212 lb)   SpO2 97%   BMI 32.23 kg/m      General: Well appearing, NAD  Abd: soft, nontender  Psych: normal mood and affect        No results found for this or any previous visit (from the past 24 hour(s)).    Abnormal bowel movement  Reassured that they are infrequent and not severe.  Colonoscopy is up to date.  Will have him keep food journal and if worsening will refer to GI.  Follow up in 2 months, sooner if needed    Elevated blood pressure reading without diagnosis of hypertension  Monitor BP at home and follow up in 2 months, sooner if worse    Follow up in 2 months          Answers submitted by the patient for this visit:  General Questionnaire (Submitted on 3/25/2024)  Chief Complaint: Chronic problems general questions HPI Form  How many servings of fruits and vegetables do you eat daily?: 0-1  On average, how many sweetened beverages do you drink each day (Examples: soda, juice, sweet tea, etc.  Do NOT count diet or artificially sweetened beverages)?: 0  How many minutes a day do you exercise enough to make your heart beat faster?: 9 or less  How many days a week do you exercise enough to make your heart beat faster?: 3 or less  How many days per week do you miss taking your medication?: 0  General Concern (Submitted on 3/25/2024)  Chief Complaint: " Chronic problems general questions HPI Form  What is the reason for your visit today?: Having emergency ball movements  When did your symptoms begin?: More than a month  What are your symptoms?: Feeling in stomach and then looking for bathroom  How would you describe these symptoms?: Moderate  Are your symptoms:: Worsening  Have you had these symptoms before?: Yes  Have you tried or received treatment for these symptoms before?: No  Is there anything that makes you feel worse?: No  Is there anything that makes you feel better?: No

## 2024-04-01 NOTE — TELEPHONE ENCOUNTER
Med: SIMVASTATIN    LOV (related): 10/23/2023    Last Lab: 10/23/2023      Due for F/U around: 10/23/2024    Next Appt: 4/1/2024        Cholesterol   Date Value Ref Range Status   10/23/2023 131 100 - 199 mg/dL Final   09/27/2022 129 100 - 199 mg/dl Final   07/20/2021 157 100 - 199 mg/dL Final   03/21/2019 160 100 - 199 mg/dL Final     HDL Cholesterol   Date Value Ref Range Status   07/20/2021 41 >39 mg/dL Final   03/21/2019 37 (L) >39 mg/dL Final     HDL   Date Value Ref Range Status   10/23/2023 37 (A) 40 mg/dL Final   09/27/2022 33 (A) 40 mg/dl Final     LDL Cholesterol Calculated   Date Value Ref Range Status   07/20/2021 83 0 - 99 mg/dL Final   03/21/2019 89 0 - 99 mg/dL Final     LDL CALCULATED ([facility])   Date Value Ref Range Status   10/23/2023 66 0 - 130 mg/dL Final   09/27/2022 70 0 - 130 mg/dl Final     Triglycerides   Date Value Ref Range Status   10/23/2023 140 0 - 149 mg/dL Final   09/27/2022 132 0 - 149 mg/dl Final   07/20/2021 198 (H) 0 - 149 mg/dL Final   03/21/2019 169 (H) 0 - 149 mg/dL Final     Cholesterol/HDL Ratio   Date Value Ref Range Status   06/02/2006 5.2 (H) 0.0 - 5.0 Final   07/17/2004 5.5 (H) 0.0 - 5.0 Final

## 2024-05-28 ENCOUNTER — TRANSFERRED RECORDS (OUTPATIENT)
Dept: FAMILY MEDICINE | Facility: CLINIC | Age: 74
End: 2024-05-28

## 2024-06-25 DIAGNOSIS — E78.5 HYPERLIPIDEMIA LDL GOAL <130: ICD-10-CM

## 2024-06-25 RX ORDER — SIMVASTATIN 10 MG
TABLET ORAL
Qty: 60 TABLET | Refills: 0 | Status: SHIPPED | OUTPATIENT
Start: 2024-06-25 | End: 2024-08-30

## 2024-06-25 NOTE — TELEPHONE ENCOUNTER
Med: simvastatin (ZOCOR) 10 MG tablet     LOV (related): 10/23/23    Last Lab: 10/23/23      Due for F/U around: Monitor BP at home and follow up in 2 months, sooner if worse     Follow up in 2 months- due now    Next Appt: none        Cholesterol   Date Value Ref Range Status   10/23/2023 131 100 - 199 mg/dL Final   09/27/2022 129 100 - 199 mg/dl Final   07/20/2021 157 100 - 199 mg/dL Final   03/21/2019 160 100 - 199 mg/dL Final     HDL Cholesterol   Date Value Ref Range Status   07/20/2021 41 >39 mg/dL Final   03/21/2019 37 (L) >39 mg/dL Final     HDL   Date Value Ref Range Status   10/23/2023 37 (A) 40 mg/dL Final   09/27/2022 33 (A) 40 mg/dl Final     LDL Cholesterol Calculated   Date Value Ref Range Status   07/20/2021 83 0 - 99 mg/dL Final   03/21/2019 89 0 - 99 mg/dL Final     LDL CALCULATED ([facility])   Date Value Ref Range Status   10/23/2023 66 0 - 130 mg/dL Final   09/27/2022 70 0 - 130 mg/dl Final     Triglycerides   Date Value Ref Range Status   10/23/2023 140 0 - 149 mg/dL Final   09/27/2022 132 0 - 149 mg/dl Final   07/20/2021 198 (H) 0 - 149 mg/dL Final   03/21/2019 169 (H) 0 - 149 mg/dL Final     Cholesterol/HDL Ratio   Date Value Ref Range Status   06/02/2006 5.2 (H) 0.0 - 5.0 Final   07/17/2004 5.5 (H) 0.0 - 5.0 Final

## 2024-08-29 DIAGNOSIS — E78.5 HYPERLIPIDEMIA LDL GOAL <130: ICD-10-CM

## 2024-08-30 RX ORDER — SIMVASTATIN 10 MG
TABLET ORAL
Qty: 60 TABLET | Refills: 0 | Status: SHIPPED | OUTPATIENT
Start: 2024-08-30

## 2024-08-30 NOTE — TELEPHONE ENCOUNTER
Med: Simvastatin    LOV (related): 10/23/23    Last Lab: 10/23/23      Due for F/U around: 10/28/24    Next Appt: 10/28/24        Cholesterol   Date Value Ref Range Status   10/23/2023 131 100 - 199 mg/dL Final   09/27/2022 129 100 - 199 mg/dl Final   07/20/2021 157 100 - 199 mg/dL Final   03/21/2019 160 100 - 199 mg/dL Final     HDL Cholesterol   Date Value Ref Range Status   07/20/2021 41 >39 mg/dL Final   03/21/2019 37 (L) >39 mg/dL Final     HDL   Date Value Ref Range Status   10/23/2023 37 (A) 40 mg/dL Final   09/27/2022 33 (A) 40 mg/dl Final     LDL Cholesterol Calculated   Date Value Ref Range Status   07/20/2021 83 0 - 99 mg/dL Final   03/21/2019 89 0 - 99 mg/dL Final     LDL CALCULATED ([facility])   Date Value Ref Range Status   10/23/2023 66 0 - 130 mg/dL Final   09/27/2022 70 0 - 130 mg/dl Final     Triglycerides   Date Value Ref Range Status   10/23/2023 140 0 - 149 mg/dL Final   09/27/2022 132 0 - 149 mg/dl Final   07/20/2021 198 (H) 0 - 149 mg/dL Final   03/21/2019 169 (H) 0 - 149 mg/dL Final     Cholesterol/HDL Ratio   Date Value Ref Range Status   06/02/2006 5.2 (H) 0.0 - 5.0 Final   07/17/2004 5.5 (H) 0.0 - 5.0 Final

## 2024-10-09 PROCEDURE — 91320 SARSCV2 VAC 30MCG TRS-SUC IM: CPT

## 2024-10-09 PROCEDURE — G0008 ADMIN INFLUENZA VIRUS VAC: HCPCS

## 2024-10-09 PROCEDURE — 90480 ADMN SARSCOV2 VAC 1/ONLY CMP: CPT

## 2024-10-09 PROCEDURE — 90653 IIV ADJUVANT VACCINE IM: CPT

## 2024-10-22 SDOH — HEALTH STABILITY: PHYSICAL HEALTH: ON AVERAGE, HOW MANY MINUTES DO YOU ENGAGE IN EXERCISE AT THIS LEVEL?: 0 MIN

## 2024-10-22 SDOH — HEALTH STABILITY: PHYSICAL HEALTH: ON AVERAGE, HOW MANY DAYS PER WEEK DO YOU ENGAGE IN MODERATE TO STRENUOUS EXERCISE (LIKE A BRISK WALK)?: 0 DAYS

## 2024-10-22 ASSESSMENT — SOCIAL DETERMINANTS OF HEALTH (SDOH): HOW OFTEN DO YOU GET TOGETHER WITH FRIENDS OR RELATIVES?: TWICE A WEEK

## 2024-10-27 DIAGNOSIS — E78.5 HYPERLIPIDEMIA LDL GOAL <130: ICD-10-CM

## 2024-10-28 ENCOUNTER — OFFICE VISIT (OUTPATIENT)
Dept: FAMILY MEDICINE | Facility: CLINIC | Age: 74
End: 2024-10-28

## 2024-10-28 VITALS
OXYGEN SATURATION: 98 % | HEIGHT: 69 IN | WEIGHT: 209 LBS | HEART RATE: 68 BPM | DIASTOLIC BLOOD PRESSURE: 92 MMHG | SYSTOLIC BLOOD PRESSURE: 128 MMHG | BODY MASS INDEX: 30.96 KG/M2

## 2024-10-28 DIAGNOSIS — E66.811 CLASS 1 OBESITY DUE TO EXCESS CALORIES WITH SERIOUS COMORBIDITY AND BODY MASS INDEX (BMI) OF 31.0 TO 31.9 IN ADULT: ICD-10-CM

## 2024-10-28 DIAGNOSIS — E78.5 DYSLIPIDEMIA: ICD-10-CM

## 2024-10-28 DIAGNOSIS — I10 PRIMARY HYPERTENSION: ICD-10-CM

## 2024-10-28 DIAGNOSIS — R73.03 PREDIABETES: ICD-10-CM

## 2024-10-28 DIAGNOSIS — Z00.00 ENCOUNTER FOR MEDICARE ANNUAL WELLNESS EXAM: Primary | ICD-10-CM

## 2024-10-28 DIAGNOSIS — G47.33 OSA (OBSTRUCTIVE SLEEP APNEA): ICD-10-CM

## 2024-10-28 DIAGNOSIS — E66.09 CLASS 1 OBESITY DUE TO EXCESS CALORIES WITH SERIOUS COMORBIDITY AND BODY MASS INDEX (BMI) OF 31.0 TO 31.9 IN ADULT: ICD-10-CM

## 2024-10-28 DIAGNOSIS — Z12.5 PROSTATE CANCER SCREENING: ICD-10-CM

## 2024-10-28 LAB
ALBUMIN SERPL BCG-MCNC: 4.5 G/DL (ref 3.5–5.2)
ALP SERPL-CCNC: 93 U/L (ref 40–150)
ALT SERPL W P-5'-P-CCNC: 27 U/L (ref 0–70)
ANION GAP SERPL CALCULATED.3IONS-SCNC: 8 MMOL/L (ref 7–15)
AST SERPL W P-5'-P-CCNC: 25 U/L (ref 0–45)
BILIRUB SERPL-MCNC: 0.5 MG/DL
BUN SERPL-MCNC: 13.8 MG/DL (ref 8–23)
CALCIUM SERPL-MCNC: 10.1 MG/DL (ref 8.8–10.4)
CHLORIDE SERPL-SCNC: 105 MMOL/L (ref 98–107)
CHOLESTEROL: 149 MG/DL (ref 100–199)
CREAT SERPL-MCNC: 1.07 MG/DL (ref 0.67–1.17)
EGFRCR SERPLBLD CKD-EPI 2021: 73 ML/MIN/1.73M2
EST. AVERAGE GLUCOSE BLD GHB EST-MCNC: 126 MG/DL
FASTING STATUS PATIENT QL REPORTED: NO
FASTING?: NO
GLUCOSE SERPL-MCNC: 88 MG/DL (ref 70–99)
HBA1C MFR BLD: 6 %
HCO3 SERPL-SCNC: 29 MMOL/L (ref 22–29)
HDL (RMG): 38 MG/DL (ref 40–?)
LDL CALCULATED (RMG): 59 MG/DL (ref 0–130)
POTASSIUM SERPL-SCNC: 5.4 MMOL/L (ref 3.4–5.3)
PROT SERPL-MCNC: 7.6 G/DL (ref 6.4–8.3)
SODIUM SERPL-SCNC: 142 MMOL/L (ref 135–145)
TRIGLYCERIDES (RMG): 158 MG/DL (ref 0–149)

## 2024-10-28 PROCEDURE — G0439 PPPS, SUBSEQ VISIT: HCPCS | Performed by: FAMILY MEDICINE

## 2024-10-28 PROCEDURE — 80053 COMPREHEN METABOLIC PANEL: CPT | Performed by: FAMILY MEDICINE

## 2024-10-28 PROCEDURE — 83036 HEMOGLOBIN GLYCOSYLATED A1C: CPT | Performed by: FAMILY MEDICINE

## 2024-10-28 PROCEDURE — 99214 OFFICE O/P EST MOD 30 MIN: CPT | Mod: 25 | Performed by: FAMILY MEDICINE

## 2024-10-28 PROCEDURE — 3074F SYST BP LT 130 MM HG: CPT | Performed by: FAMILY MEDICINE

## 2024-10-28 PROCEDURE — G0103 PSA SCREENING: HCPCS | Mod: 90 | Performed by: FAMILY MEDICINE

## 2024-10-28 PROCEDURE — 80061 LIPID PANEL: CPT | Mod: QW | Performed by: FAMILY MEDICINE

## 2024-10-28 PROCEDURE — 36415 COLL VENOUS BLD VENIPUNCTURE: CPT | Performed by: FAMILY MEDICINE

## 2024-10-28 RX ORDER — LOSARTAN POTASSIUM 25 MG/1
25 TABLET ORAL DAILY
Qty: 90 TABLET | Refills: 0 | Status: SHIPPED | OUTPATIENT
Start: 2024-10-28

## 2024-10-28 RX ORDER — SIMVASTATIN 10 MG
TABLET ORAL
Qty: 60 TABLET | Refills: 0 | Status: SHIPPED | OUTPATIENT
Start: 2024-10-28

## 2024-10-28 NOTE — PATIENT INSTRUCTIONS
Patient Education   Preventive Care Advice   This is general advice given by our system to help you stay healthy. However, your care team may have specific advice just for you. Please talk to your care team about your preventive care needs.  Nutrition  Eat 5 or more servings of fruits and vegetables each day.  Try wheat bread, brown rice and whole grain pasta (instead of white bread, rice, and pasta).  Get enough calcium and vitamin D. Check the label on foods and aim for 100% of the RDA (recommended daily allowance).  Lifestyle  Exercise at least 150 minutes each week  (30 minutes a day, 5 days a week).  Do muscle strengthening activities 2 days a week. These help control your weight and prevent disease.  No smoking.  Wear sunscreen to prevent skin cancer.  Have a dental exam and cleaning every 6 months.  Yearly exams  See your health care team every year to talk about:  Any changes in your health.  Any medicines your care team has prescribed.  Preventive care, family planning, and ways to prevent chronic diseases.  Shots (vaccines)   HPV shots (up to age 26), if you've never had them before.  Hepatitis B shots (up to age 59), if you've never had them before.  COVID-19 shot: Get this shot when it's due.  Flu shot: Get a flu shot every year.  Tetanus shot: Get a tetanus shot every 10 years.  Pneumococcal, hepatitis A, and RSV shots: Ask your care team if you need these based on your risk.  Shingles shot (for age 50 and up)  General health tests  Diabetes screening:  Starting at age 35, Get screened for diabetes at least every 3 years.  If you are younger than age 35, ask your care team if you should be screened for diabetes.  Cholesterol test: At age 39, start having a cholesterol test every 5 years, or more often if advised.  Bone density scan (DEXA): At age 50, ask your care team if you should have this scan for osteoporosis (brittle bones).  Hepatitis C: Get tested at least once in your life.  STIs (sexually  transmitted infections)  Before age 24: Ask your care team if you should be screened for STIs.  After age 24: Get screened for STIs if you're at risk. You are at risk for STIs (including HIV) if:  You are sexually active with more than one person.  You don't use condoms every time.  You or a partner was diagnosed with a sexually transmitted infection.  If you are at risk for HIV, ask about PrEP medicine to prevent HIV.  Get tested for HIV at least once in your life, whether you are at risk for HIV or not.  Cancer screening tests  Cervical cancer screening: If you have a cervix, begin getting regular cervical cancer screening tests starting at age 21.  Breast cancer scan (mammogram): If you've ever had breasts, begin having regular mammograms starting at age 40. This is a scan to check for breast cancer.  Colon cancer screening: It is important to start screening for colon cancer at age 45.  Have a colonoscopy test every 10 years (or more often if you're at risk) Or, ask your provider about stool tests like a FIT test every year or Cologuard test every 3 years.  To learn more about your testing options, visit:   .  For help making a decision, visit:   https://bit.ly/mq15124.  Prostate cancer screening test: If you have a prostate, ask your care team if a prostate cancer screening test (PSA) at age 55 is right for you.  Lung cancer screening: If you are a current or former smoker ages 50 to 80, ask your care team if ongoing lung cancer screenings are right for you.  For informational purposes only. Not to replace the advice of your health care provider. Copyright   2023 [employer]. All rights reserved. Clinically reviewed by the [facility] Transitions Program. [place] [id number] - REV 01/24.

## 2024-10-28 NOTE — PROGRESS NOTES
"Preventive Care Visit  [facility]  Jonh Vargas MD, Family Medicine  Oct 28, 2024      Assessment & Plan     Encounter for Medicare annual wellness exam  - discussed preventative guidelines, healthy diet, exercise and weight management    Dyslipidemia  Recheck, cont statin  - Lipid Profile ([facility])  - VENOUS COLLECTION    Prediabetes  Recheck  Diet and exercise  - Hemoglobin A1c; Future  - Hemoglobin A1c    HANNAH (obstructive sleep apnea)  Cont CPAP  Weight loss    Class 1 obesity due to excess calories with serious comorbidity and body mass index (BMI) of 31.0 to 31.9 in adult  - Discussed importance of optimizing diet, exercise and healthy weight    Primary hypertension  Discussed.  Will start medication  Recheck in 2-4 weeks with BMP  - Comprehensive metabolic panel; Future  - MOST RECENT SYSTOLIC BLOOD PRESSURE < 130 MM HG  - losartan (COZAAR) 25 MG tablet; Take 1 tablet (25 mg) by mouth daily.  - Comprehensive metabolic panel    Prostate cancer screening  The natural history of prostate cancer and ongoing controversy regarding screening and potential treatment outcomes of prostate cancer has been discussed with the patient. The meaning of a false positive PSA and a false negative PSA has been discussed, as well as the concept of overdiagnosis.  He indicates understanding of the limitations of this screening test and wishes to proceed with screening PSA testing.   - Prostate Specific Antigen Screen; Future  - Prostate Specific Antigen Screen    Patient has been advised of split billing requirements and indicates understanding: Yes        BMI  Estimated body mass index is 31.13 kg/m  as calculated from the following:    Height as of this encounter: 1.745 m (5' 8.7\").    Weight as of this encounter: 94.8 kg (209 lb).   Weight management plan: Discussed healthy diet and exercise guidelines    Counseling  Appropriate preventive services were addressed with this patient via screening, questionnaire, or " discussion as appropriate for fall prevention, nutrition, physical activity, Tobacco-use cessation, social engagement, weight loss and cognition.  Checklist reviewing preventive services available has been given to the patient.  Reviewed patient's diet, addressing concerns and/or questions.   The patient was instructed to see the dentist every 6 months.       See Patient Instructions    No follow-ups on file.    Subjective   Roberto is a 74 year old, presenting for the following:  Physical (Not fasting./No concerns. //COLONOSCOPY: last- 04/03/2019             due- 04/2029/)            HPI    Prediabetes: weight down 5 lbs.  Asymptomatic     HLD: on statin.  No CP, SOB.     HANNAH: moderate, on CPAP    HTN: not on medication    Health Care Directive  Patient does not have a Health Care Directive: Discussed advance care planning with patient; information given to patient to review.      10/22/2024   General Health   How would you rate your overall physical health? Excellent   Feel stress (tense, anxious, or unable to sleep) Not at all            10/22/2024   Nutrition   Diet: Regular (no restrictions)            10/22/2024   Exercise   Days per week of moderate/strenous exercise 0 days   Average minutes spent exercising at this level 0 min      (!) EXERCISE CONCERN      10/22/2024   Social Factors   Frequency of gathering with friends or relatives Twice a week   Worry food won't last until get money to buy more No   Food not last or not have enough money for food? No   Do you have housing? (Housing is defined as stable permanent housing and does not include staying ouside in a car, in a tent, in an abandoned building, in an overnight shelter, or couch-surfing.) Yes   Are you worried about losing your housing? No   Lack of transportation? No   Unable to get utilities (heat,electricity)? No            10/22/2024   Fall Risk   Fallen 2 or more times in the past year? No    Trouble with walking or balance? No         Patient-reported          10/22/2024   Activities of Daily Living- Home Safety   Needs help with the following daily activites None of the above   Safety concerns in the home None of the above            10/22/2024   Dental   Dentist two times every year? (!) NO            10/22/2024   Hearing Screening   Hearing concerns? None of the above    Wears hearing aids.         10/22/2024   Driving Risk Screening   Patient/family members have concerns about driving No            10/22/2024   General Alertness/Fatigue Screening   Have you been more tired than usual lately? No            10/22/2024   Urinary Incontinence Screening   Bothered by leaking urine in past 6 months No            10/22/2024   TB Screening   Were you born outside of the [place]? No            Today's PHQ-2 Score:       10/27/2024    11:02 AM   PHQ-2 ( 1999 [employer])   Q1: Little interest or pleasure in doing things 0    Q2: Feeling down, depressed or hopeless 0    PHQ-2 Score 0    Q1: Little interest or pleasure in doing things Not at all   Q2: Feeling down, depressed or hopeless Not at all   PHQ-2 Score 0       Patient-reported           10/22/2024   Substance Use   Alcohol more than 3/day or more than 7/wk Not Applicable   Do you have a current opioid prescription? No   How severe/bad is pain from 1 to 10? 1/10   Do you use any other substances recreationally? No        Social History     Tobacco Use    Smoking status: Never    Smokeless tobacco: Never   Substance Use Topics    Alcohol use: Yes     Comment: 1 a month    Drug use: No           10/22/2024   AAA Screening   Family history of Abdominal Aortic Aneurysm (AAA)? No      ASCVD Risk   The 10-year ASCVD risk score (Dolly GIRARD, et al., 2019) is: 20.8%    Values used to calculate the score:      Age: 74 years      Sex: Male      Is Non- : No      Diabetic: No      Tobacco smoker: No      Systolic Blood Pressure: 128 mmHg      Is BP treated: No      HDL Cholesterol: 37  "mg/dL      Total Cholesterol: 131 mg/dL            Reviewed and updated as needed this visit by Provider                    Lab work is in process  Current providers sharing in care for this patient include:  Patient Care Team:  Jonh Vargas MD as PCP - General (Family Medicine)  Jonh Vargas MD as Assigned PCP    The following health maintenance items are reviewed in Epic and correct as of today:  Health Maintenance   Topic Date Due    LIPID  10/23/2024    PSA  10/23/2024    MEDICARE ANNUAL WELLNESS VISIT  10/28/2025    FALL RISK ASSESSMENT  10/28/2025    RSV VACCINE (1 - 1-dose 75+ series) 08/10/2026    GLUCOSE  10/23/2026    DTAP/TDAP/TD IMMUNIZATION (3 - Td or Tdap) 12/28/2026    ADVANCE CARE PLANNING  10/23/2028    COLORECTAL CANCER SCREENING  04/03/2029    HEPATITIS C SCREENING  Completed    PHQ-2 (once per calendar year)  Completed    INFLUENZA VACCINE  Completed    Pneumococcal Vaccine: 65+ Years  Completed    ZOSTER IMMUNIZATION  Completed    COVID-19 Vaccine  Completed    HPV IMMUNIZATION  Aged Out    MENINGITIS IMMUNIZATION  Aged Out    RSV MONOCLONAL ANTIBODY  Aged Out         Review of Systems  Constitutional, HEENT, cardiovascular, pulmonary, GI, , musculoskeletal, neuro, skin, endocrine and psych systems are negative, except as otherwise noted.     Objective    Exam  BP (!) 128/92   Pulse 68   Ht 1.745 m (5' 8.7\")   Wt 94.8 kg (209 lb)   SpO2 98%   BMI 31.13 kg/m     Estimated body mass index is 31.13 kg/m  as calculated from the following:    Height as of this encounter: 1.745 m (5' 8.7\").    Weight as of this encounter: 94.8 kg (209 lb).    Physical Exam  GENERAL: alert and no distress  EYES: Eyes grossly normal to inspection, PERRL and conjunctivae and sclerae normal  HENT: ear canals and TM's normal, nose and mouth without ulcers or lesions  NECK: no adenopathy, no asymmetry, masses, or scars  RESP: lungs clear to auscultation - no rales, rhonchi or wheezes  CV: regular " rate and rhythm, normal S1 S2, no S3 or S4, no murmur, click or rub, no peripheral edema  ABDOMEN: soft, nontender, no hepatosplenomegaly, no masses and bowel sounds normal  RECTAL: patient declined  MS: no gross musculoskeletal defects noted, no edema  SKIN: no suspicious lesions or rashes  NEURO: Normal strength and tone, mentation intact and speech normal  PSYCH: mentation appears normal, affect normal/bright         10/28/2024   Mini Cog   Clock Draw Score 2 Normal   3 Item Recall 3 objects recalled   Mini Cog Total Score 5                 Signed Electronically by: Jonh Vargas MD

## 2024-10-28 NOTE — TELEPHONE ENCOUNTER
Med: Simvastatin      LOV (related): 10/23/26-cpx    Last Lab: 10/28/24      Due for F/U around:   due for CPX 10/2025      Next Appt: No current future appointments scheduled at [facility]         Cholesterol   Date Value Ref Range Status   10/28/2024 149 100 - 199 mg/dL Final   10/23/2023 131 100 - 199 mg/dL Final   07/20/2021 157 100 - 199 mg/dL Final   03/21/2019 160 100 - 199 mg/dL Final     HDL Cholesterol   Date Value Ref Range Status   07/20/2021 41 >39 mg/dL Final   03/21/2019 37 (L) >39 mg/dL Final     HDL   Date Value Ref Range Status   10/28/2024 38 (A) >=40 mg/dL Final   10/23/2023 37 (A) >=40 mg/dL Final     LDL Cholesterol Calculated   Date Value Ref Range Status   07/20/2021 83 0 - 99 mg/dL Final   03/21/2019 89 0 - 99 mg/dL Final     LDL CALCULATED ([facility])   Date Value Ref Range Status   10/28/2024 59 0 - 130 mg/dL Final   10/23/2023 66 0 - 130 mg/dL Final     Triglycerides   Date Value Ref Range Status   10/28/2024 158 (A) 0 - 149 mg/dL Final   10/23/2023 140 0 - 149 mg/dL Final   07/20/2021 198 (H) 0 - 149 mg/dL Final   03/21/2019 169 (H) 0 - 149 mg/dL Final     Cholesterol/HDL Ratio   Date Value Ref Range Status   06/02/2006 5.2 (H) 0.0 - 5.0 Final   07/17/2004 5.5 (H) 0.0 - 5.0 Final

## 2024-10-29 LAB — PSA SERPL DL<=0.01 NG/ML-MCNC: 1.17 NG/ML (ref 0–6.5)

## 2024-11-20 ENCOUNTER — TRANSFERRED RECORDS (OUTPATIENT)
Dept: FAMILY MEDICINE | Facility: CLINIC | Age: 74
End: 2024-11-20

## 2024-11-26 ENCOUNTER — OFFICE VISIT (OUTPATIENT)
Dept: FAMILY MEDICINE | Facility: CLINIC | Age: 74
End: 2024-11-26

## 2024-11-26 VITALS
WEIGHT: 211 LBS | SYSTOLIC BLOOD PRESSURE: 135 MMHG | HEART RATE: 63 BPM | BODY MASS INDEX: 31.43 KG/M2 | OXYGEN SATURATION: 98 % | DIASTOLIC BLOOD PRESSURE: 86 MMHG

## 2024-11-26 DIAGNOSIS — E78.5 HYPERLIPIDEMIA LDL GOAL <130: ICD-10-CM

## 2024-11-26 DIAGNOSIS — I10 PRIMARY HYPERTENSION: ICD-10-CM

## 2024-11-26 LAB
ANION GAP SERPL CALCULATED.3IONS-SCNC: 10 MMOL/L (ref 7–15)
BUN SERPL-MCNC: 14.5 MG/DL (ref 8–23)
CALCIUM SERPL-MCNC: 9.4 MG/DL (ref 8.8–10.4)
CHLORIDE SERPL-SCNC: 104 MMOL/L (ref 98–107)
CREAT SERPL-MCNC: 1 MG/DL (ref 0.67–1.17)
EGFRCR SERPLBLD CKD-EPI 2021: 79 ML/MIN/1.73M2
FASTING STATUS PATIENT QL REPORTED: ABNORMAL
GLUCOSE SERPL-MCNC: 104 MG/DL (ref 70–99)
HCO3 SERPL-SCNC: 25 MMOL/L (ref 22–29)
POTASSIUM SERPL-SCNC: 4.5 MMOL/L (ref 3.4–5.3)
SODIUM SERPL-SCNC: 139 MMOL/L (ref 135–145)

## 2024-11-26 PROCEDURE — 80048 BASIC METABOLIC PNL TOTAL CA: CPT | Mod: 90 | Performed by: FAMILY MEDICINE

## 2024-11-26 PROCEDURE — 99213 OFFICE O/P EST LOW 20 MIN: CPT | Performed by: FAMILY MEDICINE

## 2024-11-26 PROCEDURE — 3075F SYST BP GE 130 - 139MM HG: CPT | Performed by: FAMILY MEDICINE

## 2024-11-26 PROCEDURE — G2211 COMPLEX E/M VISIT ADD ON: HCPCS | Performed by: FAMILY MEDICINE

## 2024-11-26 PROCEDURE — 36415 COLL VENOUS BLD VENIPUNCTURE: CPT | Performed by: FAMILY MEDICINE

## 2024-11-26 RX ORDER — AMOXICILLIN 500 MG/1
CAPSULE ORAL
COMMUNITY
Start: 2024-11-18

## 2024-11-26 RX ORDER — LOSARTAN POTASSIUM 25 MG/1
25 TABLET ORAL DAILY
Qty: 90 TABLET | Refills: 3 | Status: SHIPPED | OUTPATIENT
Start: 2024-11-26

## 2024-11-26 RX ORDER — SIMVASTATIN 10 MG
TABLET ORAL
Qty: 90 TABLET | Refills: 3 | Status: SHIPPED | OUTPATIENT
Start: 2024-11-26

## 2024-11-26 NOTE — PROGRESS NOTES
Edwin Cardoso is a 74 year old patient who presents to clinic for HTN follow up.  Losartan 25 mg was added last visit.  Home readings 120s mostly.  A little lightheaded initially but now doing well.  No other side effects.        Review of Systems   Constitutional, HEENT, cardiovascular, pulmonary, gi and gu systems are negative, except as otherwise noted.      Objective    /86   Pulse 63   Wt 95.7 kg (211 lb)   SpO2 98%   BMI 31.43 kg/m      General: Well appearing, NAD  Psych: normal mood and affect        Primary hypertension  At goal, cont losartan  Check bmp  - Basic metabolic panel; Future  - VENOUS COLLECTION  - losartan (COZAAR) 25 MG tablet; Take 1 tablet (25 mg) by mouth daily.  - MOST RECENT SYSTOLIC BLOOD PRESS -139MM HG  - Basic metabolic panel    Hyperlipidemia LDL goal <130  stable/controlled. Cont current medication(s) and treatment  - simvastatin (ZOCOR) 10 MG tablet; TAKE 1 TABLET BY MOUTH ONCE DAILY AT BEDTIME.    Follow up in 1 year for AWV          Answers submitted by the patient for this visit:  General Questionnaire (Submitted on 11/25/2024)  Chief Complaint: Chronic problems general questions HPI Form  What is the reason for your visit today? : Med check  How many days per week do you miss taking your medication?: 0  Questionnaire about: Chronic problems general questions HPI Form (Submitted on 11/25/2024)  Chief Complaint: Chronic problems general questions HPI Form

## 2025-08-12 ENCOUNTER — VIRTUAL VISIT (OUTPATIENT)
Dept: FAMILY MEDICINE | Facility: CLINIC | Age: 75
End: 2025-08-12

## 2025-08-12 DIAGNOSIS — U07.1 INFECTION DUE TO 2019 NOVEL CORONAVIRUS: Primary | ICD-10-CM

## 2025-08-12 PROCEDURE — 98006 SYNCH AUDIO-VIDEO EST MOD 30: CPT | Performed by: FAMILY MEDICINE

## 2025-08-12 RX ORDER — DEXAMETHASONE SODIUM PHOSPHATE 1 MG/ML
SOLUTION/ DROPS OPHTHALMIC
COMMUNITY
Start: 2025-06-11

## (undated) DEVICE — SUCTION IRR SYSTEM W/O TIP INTERPULSE HANDPIECE 0210-100-000

## (undated) DEVICE — GLOVE PROTEXIS POWDER FREE 8.5 ORTHOPEDIC 2D73ET85

## (undated) DEVICE — PACK TOTAL KNEE SOP15TKFSD

## (undated) DEVICE — GLOVE PROTEXIS BLUE W/NEU-THERA 7.0  2D73EB70

## (undated) DEVICE — MANIFOLD NEPTUNE 4 PORT 700-20

## (undated) DEVICE — SU VICRYL 2-0 CP-1 27" UND J266H

## (undated) DEVICE — DRSG KERLIX FLUFFS X5

## (undated) DEVICE — ESU GROUND PAD UNIVERSAL W/O CORD

## (undated) DEVICE — GLOVE PROTEXIS W/NEU-THERA 7.0  2D73TE70

## (undated) DEVICE — SU VICRYL 1 CP-1 27" J468H

## (undated) DEVICE — DRAPE SHEET REV FOLD 3/4 9349

## (undated) DEVICE — IMM KNEE 20" 0814-2660

## (undated) DEVICE — LINEN TOWEL PACK X5 5464

## (undated) DEVICE — BLADE SAW SAGITTAL STRK 25X90X1.37MM 4H SYS 6 6125-137-090

## (undated) DEVICE — GLOVE PROTEXIS W/NEU-THERA 8.5  2D73TE85

## (undated) DEVICE — CAST PADDING 6" UNSTERILE 9046

## (undated) DEVICE — DRSG ABDOMINAL 07 1/2X8" 7197D

## (undated) DEVICE — BONE CEMENT MIXEVAC III HI VAC KIT  0206-015-000

## (undated) DEVICE — BONE CLEANING TIP INTERPULSE  0210-010-000

## (undated) DEVICE — DRSG XEROFORM 5X9" 8884431605

## (undated) DEVICE — SOL NACL 0.9% IRRIG 1000ML BOTTLE 07138-09

## (undated) DEVICE — DRSG GAUZE 4X4" 3033

## (undated) DEVICE — DRAIN ROUND W/RESERV KIT JACKSON PRATT 10FR 400ML SU130-402D

## (undated) RX ORDER — CEFAZOLIN SODIUM 2 G/100ML
INJECTION, SOLUTION INTRAVENOUS
Status: DISPENSED
Start: 2017-09-18

## (undated) RX ORDER — ONDANSETRON 2 MG/ML
INJECTION INTRAMUSCULAR; INTRAVENOUS
Status: DISPENSED
Start: 2017-09-18

## (undated) RX ORDER — OXYCODONE HCL 10 MG/1
TABLET, FILM COATED, EXTENDED RELEASE ORAL
Status: DISPENSED
Start: 2017-09-18

## (undated) RX ORDER — FENTANYL CITRATE 50 UG/ML
INJECTION, SOLUTION INTRAMUSCULAR; INTRAVENOUS
Status: DISPENSED
Start: 2017-09-18

## (undated) RX ORDER — PROPOFOL 10 MG/ML
INJECTION, EMULSION INTRAVENOUS
Status: DISPENSED
Start: 2017-09-18

## (undated) RX ORDER — DEXAMETHASONE SODIUM PHOSPHATE 4 MG/ML
INJECTION, SOLUTION INTRA-ARTICULAR; INTRALESIONAL; INTRAMUSCULAR; INTRAVENOUS; SOFT TISSUE
Status: DISPENSED
Start: 2017-09-18

## (undated) RX ORDER — ACETAMINOPHEN 500 MG
TABLET ORAL
Status: DISPENSED
Start: 2017-09-18